# Patient Record
Sex: MALE | Race: WHITE | NOT HISPANIC OR LATINO | Employment: FULL TIME | URBAN - METROPOLITAN AREA
[De-identification: names, ages, dates, MRNs, and addresses within clinical notes are randomized per-mention and may not be internally consistent; named-entity substitution may affect disease eponyms.]

---

## 2018-09-13 ENCOUNTER — TELEPHONE (OUTPATIENT)
Dept: DERMATOLOGY | Facility: CLINIC | Age: 33
End: 2018-09-13

## 2018-09-13 NOTE — TELEPHONE ENCOUNTER
Called PT and rescheduled to sooner appointment.     ----- Message from Dianne Singh sent at 9/13/2018 10:40 AM CDT -----  Contact: Pt  Name of Who is Calling: LOGAN WINSTON [[id number]]      What is the request in detail: Patient is requesting a callback from staff in regards to his scheduled appointment. Please contact to further discuss and advise      Can the clinic reply by [facility]: No       [facility]: [phone]

## 2018-09-25 ENCOUNTER — OFFICE VISIT (OUTPATIENT)
Dept: DERMATOLOGY | Facility: CLINIC | Age: 33
End: 2018-09-25
Payer: COMMERCIAL

## 2018-09-25 DIAGNOSIS — D22.39 FIBROUS PAPULE OF NOSE: Primary | ICD-10-CM

## 2018-09-25 DIAGNOSIS — L73.8 SEBACEOUS HYPERPLASIA: ICD-10-CM

## 2018-09-25 DIAGNOSIS — D23.62 DERMATOFIBROMA OF LEFT UPPER ARM: ICD-10-CM

## 2018-09-25 PROCEDURE — 99213 OFFICE O/P EST LOW 20 MIN: CPT | Mod: S$GLB,,, | Performed by: DERMATOLOGY

## 2018-09-25 NOTE — PROGRESS NOTES
Subjective:       Patient ID:  Jim Waddell is a 33 y.o. male who presents for   Chief Complaint   Patient presents with    Mass     left forearms, 3 months, new    Lesion     nose, 6 months, new      This is a previous patient of mine who is here today with a couple of new complaints. Pt complains of a bump to his left forearm that has been present for about 3 months. It started as a mosquito bite. No symptoms. No change in size, shape, or color. Patient denies any itching, bleeding, pain, or rapid growth.    He also complains of a lesion on his nose that has been present for about 6 months with no symptoms. No change in size, shape, or color. Patient denies any itching, bleeding, pain, or rapid growth.        Mass  - Initial  Affected locations: left arm  Duration: 3 months  Signs / symptoms: asymptomatic  Severity: mild  Timing: constant  Aggravated by: nothing  Relieving factors/Treatments tried: nothing  Improvement on treatment: no relief    Lesion  - Initial  Affected locations: nose  Duration: 6 months  Signs / symptoms: asymptomatic  Severity: mild  Timing: constant  Aggravated by: nothing  Relieving factors/Treatments tried: nothing  Improvement on treatment: no relief      Review of Systems   Skin: Negative for itching and rash.        Objective:    Physical Exam   Constitutional: He appears well-developed and well-nourished. No distress.   HENT:   Mouth/Throat: Lips normal.    Eyes: Lids are normal.  No conjunctival no injection.   Neurological: He is alert and oriented to person, place, and time. He is not disoriented.   Psychiatric: He has a normal mood and affect.   Skin:   Areas Examined (abnormalities noted in diagram):   Head / Face Inspection Performed  Neck Inspection Performed  RUE Inspected  LUE Inspection Performed                   Diagram Legend     Erythematous scaling macule/papule c/w actinic keratosis       Vascular papule c/w angioma      Pigmented verrucoid papule/plaque  c/w seborrheic keratosis      Yellow umbilicated papule c/w sebaceous hyperplasia      Irregularly shaped tan macule c/w lentigo     1-2 mm smooth white papules consistent with Milia      Movable subcutaneous cyst with punctum c/w epidermal inclusion cyst      Subcutaneous movable cyst c/w pilar cyst      Firm pink to brown papule c/w dermatofibroma      Pedunculated fleshy papule(s) c/w skin tag(s)      Evenly pigmented macule c/w junctional nevus     Mildly variegated pigmented, slightly irregular-bordered macule c/w mildly atypical nevus      Flesh colored to evenly pigmented papule c/w intradermal nevus       Pink pearly papule/plaque c/w basal cell carcinoma      Erythematous hyperkeratotic cursted plaque c/w SCC      Surgical scar with no sign of skin cancer recurrence      Open and closed comedones      Inflammatory papules and pustules      Verrucoid papule consistent consistent with wart     Erythematous eczematous patches and plaques     Dystrophic onycholytic nail with subungual debris c/w onychomycosis     Umbilicated papule    Erythematous-base heme-crusted tan verrucoid plaque consistent with inflamed seborrheic keratosis     Erythematous Silvery Scaling Plaque c/w Psoriasis     See annotation      Assessment / Plan:        Fibrous papule of nose  Reassurance was given to the patient. No treatment is necessary.  Discussed biopsy if lesion changes, grows, or bleeds.    Dermatofibroma of left upper arm  Reassurance given to patient. No treatment is necessary.  This is a benign growth that sometimes occurs as a result of trauma or insect bites.    Sebaceous hyperplasia  This is a common condition representing benign enlargement of oil glands. It typically occurs in adulthood. Reassurance was given to the patient. No treatment required.    Follow-up if symptoms worsen or fail to improve.

## 2019-11-11 ENCOUNTER — OFFICE VISIT (OUTPATIENT)
Dept: URGENT CARE | Facility: CLINIC | Age: 34
End: 2019-11-11
Payer: COMMERCIAL

## 2019-11-11 VITALS
HEIGHT: 69 IN | HEART RATE: 89 BPM | BODY MASS INDEX: 33.33 KG/M2 | DIASTOLIC BLOOD PRESSURE: 83 MMHG | RESPIRATION RATE: 16 BRPM | WEIGHT: 225 LBS | SYSTOLIC BLOOD PRESSURE: 121 MMHG | TEMPERATURE: 98 F | OXYGEN SATURATION: 96 %

## 2019-11-11 DIAGNOSIS — J06.9 VIRAL URI WITH COUGH: ICD-10-CM

## 2019-11-11 DIAGNOSIS — L03.031 CELLULITIS OF TOE OF RIGHT FOOT: Primary | ICD-10-CM

## 2019-11-11 DIAGNOSIS — L60.0 INGROWN TOENAIL: ICD-10-CM

## 2019-11-11 PROCEDURE — 99203 PR OFFICE/OUTPT VISIT, NEW, LEVL III, 30-44 MIN: ICD-10-PCS | Mod: S$GLB,,, | Performed by: PHYSICIAN ASSISTANT

## 2019-11-11 PROCEDURE — 3008F BODY MASS INDEX DOCD: CPT | Mod: CPTII,S$GLB,, | Performed by: PHYSICIAN ASSISTANT

## 2019-11-11 PROCEDURE — 99203 OFFICE O/P NEW LOW 30 MIN: CPT | Mod: S$GLB,,, | Performed by: PHYSICIAN ASSISTANT

## 2019-11-11 PROCEDURE — 3008F PR BODY MASS INDEX (BMI) DOCUMENTED: ICD-10-PCS | Mod: CPTII,S$GLB,, | Performed by: PHYSICIAN ASSISTANT

## 2019-11-11 RX ORDER — SULFAMETHOXAZOLE AND TRIMETHOPRIM 800; 160 MG/1; MG/1
1 TABLET ORAL 2 TIMES DAILY
Qty: 14 TABLET | Refills: 0 | Status: SHIPPED | OUTPATIENT
Start: 2019-11-11 | End: 2019-11-18

## 2019-11-11 RX ORDER — PREDNISONE 10 MG/1
TABLET ORAL
Qty: 20 TABLET | Refills: 0 | Status: SHIPPED | OUTPATIENT
Start: 2019-11-11 | End: 2021-02-04 | Stop reason: ALTCHOICE

## 2019-11-11 RX ORDER — AZELASTINE 1 MG/ML
1 SPRAY, METERED NASAL 2 TIMES DAILY
Qty: 30 ML | Refills: 0 | Status: SHIPPED | OUTPATIENT
Start: 2019-11-11 | End: 2020-05-12

## 2019-11-11 RX ORDER — BENZONATATE 100 MG/1
100 CAPSULE ORAL EVERY 6 HOURS PRN
Qty: 60 CAPSULE | Refills: 1 | Status: SHIPPED | OUTPATIENT
Start: 2019-11-11 | End: 2020-11-10

## 2019-11-11 NOTE — PROGRESS NOTES
"Subjective:       Patient ID: Jim Waddell is a 34 y.o. male.    Vitals:  height is 5' 9" (1.753 m) and weight is 102.1 kg (225 lb). His oral temperature is 97.5 °F (36.4 °C). His blood pressure is 121/83 and his pulse is 89. His respiration is 16 and oxygen saturation is 96%.     Chief Complaint: Sinus Problem and Ingrown Toenail    Patient has been having a cough, runny nose, congestion and a headache for about 2 days. Patient has not taken any medicine for the cold. He also has what he thinks may be an ingrown toenail for about 2 days as well.     Sinus Problem   This is a new problem. The current episode started in the past 7 days. The problem is unchanged. Associated symptoms include chills, congestion, coughing, headaches, sinus pressure and a sore throat (itchy). Pertinent negatives include no diaphoresis, ear pain or shortness of breath. Past treatments include nothing. The treatment provided no relief.   Ingrown Toenail   Associated symptoms include chills, congestion, coughing, headaches and a sore throat (itchy). Pertinent negatives include no diaphoresis, fatigue, fever, myalgias, nausea, rash or vomiting.       Constitution: Positive for activity change and chills. Negative for sweating, fatigue and fever.   HENT: Positive for congestion, sinus pressure and sore throat (itchy). Negative for ear pain, sinus pain and voice change.    Neck: Negative for painful lymph nodes.   Eyes: Negative for eye redness.   Respiratory: Positive for cough and sputum production. Negative for chest tightness, bloody sputum, COPD, shortness of breath, stridor, wheezing and asthma.    Gastrointestinal: Negative for nausea and vomiting.   Musculoskeletal: Negative for muscle ache.   Skin: Negative for rash.   Allergic/Immunologic: Negative for seasonal allergies and asthma.   Neurological: Positive for headaches.   Hematologic/Lymphatic: Negative for swollen lymph nodes.       Objective:      Physical Exam "   Constitutional: He is oriented to person, place, and time. He appears well-developed and well-nourished. He is cooperative.  Non-toxic appearance. He does not have a sickly appearance. He does not appear ill. No distress.   HENT:   Head: Normocephalic and atraumatic.   Right Ear: Hearing, tympanic membrane, external ear and ear canal normal.   Left Ear: Hearing, tympanic membrane, external ear and ear canal normal.   Nose: Rhinorrhea present. No mucosal edema or nasal deformity. No epistaxis. Right sinus exhibits no maxillary sinus tenderness and no frontal sinus tenderness. Left sinus exhibits no maxillary sinus tenderness and no frontal sinus tenderness.   Mouth/Throat: Uvula is midline, oropharynx is clear and moist and mucous membranes are normal. No trismus in the jaw. Normal dentition. No uvula swelling. Cobblestoning present. No oropharyngeal exudate, posterior oropharyngeal edema or posterior oropharyngeal erythema.   Eyes: Conjunctivae and lids are normal. No scleral icterus.   Neck: Trachea normal, full passive range of motion without pain and phonation normal. Neck supple. No neck rigidity. No edema and no erythema present.   Cardiovascular: Normal rate, regular rhythm, normal heart sounds, intact distal pulses and normal pulses.   Pulmonary/Chest: Effort normal and breath sounds normal. No respiratory distress. He has no decreased breath sounds. He has no rhonchi.   Abdominal: Normal appearance.   Musculoskeletal: Normal range of motion. He exhibits no edema or deformity.        Feet:    Erythema and swelling, no fluctuance   Neurological: He is alert and oriented to person, place, and time. He exhibits normal muscle tone. Coordination normal.   Skin: Skin is warm, dry, intact, not diaphoretic and not pale.   Psychiatric: He has a normal mood and affect. His speech is normal and behavior is normal. Judgment and thought content normal. Cognition and memory are normal.   Nursing note and vitals  reviewed.        Assessment:       1. Cellulitis of toe of right foot    2. Ingrown toenail    3. Viral URI with cough        Plan:         Cellulitis of toe of right foot    Ingrown toenail    Viral URI with cough    Other orders  -     sulfamethoxazole-trimethoprim 800-160mg (BACTRIM DS) 800-160 mg Tab; Take 1 tablet by mouth 2 (two) times daily. for 7 days  Dispense: 14 tablet; Refill: 0  -     benzonatate (TESSALON PERLES) 100 MG capsule; Take 1 capsule (100 mg total) by mouth every 6 (six) hours as needed.  Dispense: 60 capsule; Refill: 1  -     predniSONE (DELTASONE) 10 MG tablet; Take 40mg for 2days, take 30mg for 2 days, take 20mg for 2 days, take 10mg for 2 days  Dispense: 20 tablet; Refill: 0  -     azelastine (ASTELIN) 137 mcg (0.1 %) nasal spray; 1 spray (137 mcg total) by Nasal route 2 (two) times daily.  Dispense: 30 mL; Refill: 0     Patient was cellulitis surrounding ingrown toenail on right foot.  Discussed with patient will need to treat infection for toenail can be removed.  Discussed warm soaks with water and Betadine.  May return to clinic when healed or see podiatrist for further treatment of ingrown toenail.    Discussed likely viral URI will need to run its course.  Discussed symptomatic treatment warning signs as listed below.    Symptomatic treatment:    Alternate Tylenol and Ibuprofen every 3 hrs  salt water gargles to soothe throat  Honey/lemon water to soothe throat  Cold-eeze helps to reduce the duration of URI symptoms  Elderberry to reduce duration of URI symptoms  Cepachol helps to numb the discomfort in throat  Nasal saline spray reduces inflammation and dryness  Warm face compresses/hot showers as often as you can to open sinuses   Vicks vapor rub at night  Flonase OTC or Nasacort OTC  Simple foods like chicken noodle soup help hydrate  Delsym helps with coughing at night  Zyrtec/Claritin during the day and Benadryl at night may help if allergy component   Zantac will help if there  is reflux from the post nasal drip  Rest as much as you can  Your symptoms will likely last 7-10 days, maybe longer depending on how it affects your body.  You are contagious 7-10, so minimize contact with others to reduce the spread to others and stay home from work or school as we discussed. Dehydration is preventable but is one of the main reasons why you will feel so badly. Drink pedialyte, gatorade or propel. Stay hydrated.  Antibiotics are not needed unless a complication(such as Otitis Media, Bacterial sinus infection or pneumonia). Taking antibiotics for Flu/Cold is not supported by evidencebased medicine and can expose you to unnecessary side effects of the medication, such as anaphylaxis.   If you experience any:  Chest pain, shortness of breath, wheezing or difficulty breathing  Severe headache, face, neck or ear pain  New rash  Fever over 101.5º F (38.6 C) for more than three days  Confusion, behavior change or seizure  Severe weakness or dizziness  Go to ER

## 2020-01-28 ENCOUNTER — NURSE TRIAGE (OUTPATIENT)
Dept: ADMINISTRATIVE | Facility: CLINIC | Age: 35
End: 2020-01-28

## 2020-01-28 NOTE — TELEPHONE ENCOUNTER
Got a call from another nurse-- does not need services.     Reason for Disposition   Caller has already spoken with another triager or PCP (or office), and has further questions and triager able to answer questions.    Additional Information   Negative: Caller has already spoken with another triager and has no further questions   Negative: Caller has already spoken with the PCP (or office), and has no further questions    Protocols used: NO CONTACT OR DUPLICATE CONTACT CALL-A-[place]

## 2020-01-28 NOTE — TELEPHONE ENCOUNTER
Jim, age 34 years, says has no pcp, called to say he has been having chest pains for the last two weeks.  Says they last at least 10 minutes when they occur, and usually at least 20 minutes, and longer.  He describes them as centered in the mid-left chest, they present as a pressure and heaviness, with a pinching and squeezing pain during the pressure and heaviness, and rate from 3-6/10 pain.  Once only he also had the pain radiate to his mid-left back, a very sharp pain. The last time he had his BP checked was during [facility] visit, 11/11/2019, and it was 121/83 at that time. He said he lives in [place], but is in [facility] the [facility] today.  Per [facility] triage protocol, recommend ED now for evaluation. Says he will turn around and go to [facility] ED on [address] now.  Recommended he not drive [place] in a car alone, with chest pain, but go to [facility] ED as is closest.  Assured him note will be provided in chart, and [facility] providers will have access to my note.      Reason for Disposition   Chest pain lasting longer than 5 minutes   Intermittent chest pain and pain has been increasing in severity or frequency    Additional Information   Negative: Severe difficulty breathing (e.g., struggling for each breath, speaks in single words)   Negative: Passed out (i.e., fainted, collapsed and was not responding)   Negative: Chest pain lasting longer than 5 minutes and ANY of the following:* Over 50 years old* Over 30 years old and at least one cardiac risk factor (i.e., high blood pressure, diabetes, high cholesterol, obesity, smoker or strong family history of heart disease)* Pain is crushing, pressure-like, or heavy * Took nitroglycerin and chest pain was not relieved* History of heart disease (i.e., angina, heart attack, bypass surgery, angioplasty, CHF)   Negative: Visible sweat on face or sweat dripping down face   Negative: Sounds like a life-threatening emergency to the triager    Negative: Followed an injury to chest   Negative: SEVERE chest pain   Negative: Pain also present in shoulder(s) or arm(s) or jaw   Negative: Difficulty breathing   Negative: Cocaine use within last 3 days   Negative: History of prior 'blood clot' in leg or lungs (i.e., deep vein thrombosis, pulmonary embolism)   Negative: Recent illness requiring prolonged bed rest (i.e., immobilization)   Negative: Hip or leg fracture in past 2 months (e.g, or had cast on leg or ankle)   Negative: Major surgery in the past month   Negative: Recent long-distance travel with prolonged time in car, bus, plane, or train (i.e., within past 2 weeks; 6 or more hours duration)   Negative: Heart beating irregularly or very rapidly    Protocols used: CHEST PAIN-A-[place]

## 2020-02-03 DIAGNOSIS — R07.89 OTHER CHEST PAIN: Primary | ICD-10-CM

## 2020-02-03 NOTE — PROGRESS NOTES
I was contacted by Mr Waddell regarding intermittent chest pain. He has been evaluated in the ED recently on 1/28. EKG and labs unremarkable.   Will obtain exercise stress echo to evaluate ongoing symptoms. He will f/u with me after the stress test.

## 2020-02-05 ENCOUNTER — HOSPITAL ENCOUNTER (OUTPATIENT)
Dept: CARDIOLOGY | Facility: CLINIC | Age: 35
Discharge: HOME | End: 2020-02-05
Attending: INTERNAL MEDICINE
Payer: COMMERCIAL

## 2020-02-05 VITALS — HEIGHT: 70 IN | WEIGHT: 230 LBS | BODY MASS INDEX: 32.93 KG/M2

## 2020-02-05 DIAGNOSIS — R07.89 OTHER CHEST PAIN: ICD-10-CM

## 2020-02-05 LAB
ASCENDING AORTA: 2.78 CM
BSA FOR ECHO PROCEDURE: 2.27 M2
CV ECHO LV RWT: 0.3 CM
CV STRESS BASE HR: 65 BPM
DIASTOLIC BLOOD PRESSURE: 70 MMHG
DOP CALC LVOT AREA: 4.2 CM2
DOP CALC LVOT DIAMETER: 2.3 CM
DOP CALC LVOT PEAK VEL: 1.1 M/S
DOP CALC LVOT STROKE VOLUME: 87 CM3
DOP CALCLVOT PEAK VEL VTI: 20.95 CM
E WAVE DECELERATION TIME: 194.53 MSEC
E/A RATIO: 1.28
E/E' RATIO: 5.48 M/S
ECHO LV POSTERIOR WALL: 0.78 CM (ref 0.6–1.1)
FRACTIONAL SHORTENING: 33 % (ref 28–44)
INTERVENTRICULAR SEPTUM: 0.85 CM (ref 0.6–1.1)
IVRT: 0.06 MSEC
LA MAJOR: 5.59 CM
LA MINOR: 5.26 CM
LA WIDTH: 4.03 CM
LEFT ATRIUM SIZE: 3.47 CM
LEFT ATRIUM VOLUME INDEX: 29.1 ML/M2
LEFT ATRIUM VOLUME: 64.42 CM3
LEFT INTERNAL DIMENSION IN SYSTOLE: 3.52 CM (ref 2.1–4)
LEFT VENTRICLE DIASTOLIC VOLUME INDEX: 59.01 ML/M2
LEFT VENTRICLE DIASTOLIC VOLUME: 130.7 ML
LEFT VENTRICLE MASS INDEX: 68 G/M2
LEFT VENTRICLE SYSTOLIC VOLUME INDEX: 23.3 ML/M2
LEFT VENTRICLE SYSTOLIC VOLUME: 51.62 ML
LEFT VENTRICULAR INTERNAL DIMENSION IN DIASTOLE: 5.22 CM (ref 3.5–6)
LEFT VENTRICULAR MASS: 149.68 G
LV LATERAL E/E' RATIO: 4.11 M/S
LV SEPTAL E/E' RATIO: 8.22 M/S
MV PEAK A VEL: 0.58 M/S
MV PEAK E VEL: 0.74 M/S
OHS CV CPX 1 MINUTE RECOVERY HEART RATE: 166 BPM
OHS CV CPX 85 PERCENT MAX PREDICTED HEART RATE MALE: 159
OHS CV CPX ESTIMATED METS: 18
OHS CV CPX MAX PREDICTED HEART RATE: 187
OHS CV CPX PATIENT IS FEMALE: 0
OHS CV CPX PATIENT IS MALE: 1
OHS CV CPX PEAK DIASTOLIC BLOOD PRESSURE: 95 MMHG
OHS CV CPX PEAK HEAR RATE: 200 BPM
OHS CV CPX PEAK RATE PRESSURE PRODUCT: NORMAL
OHS CV CPX PEAK SYSTOLIC BLOOD PRESSURE: 232 MMHG
OHS CV CPX PERCENT MAX PREDICTED HEART RATE ACHIEVED: 107
OHS CV CPX RATE PRESSURE PRODUCT PRESENTING: 6565
PISA TR MAX VEL: 2.08 M/S
PULM VEIN S/D RATIO: 0.83
PV PEAK D VEL: 0.59 M/S
PV PEAK S VEL: 0.49 M/S
RA MAJOR: 5.01 CM
RA PRESSURE: 8 MMHG
RA WIDTH: 4.2 CM
RIGHT VENTRICULAR END-DIASTOLIC DIMENSION: 4.16 CM
RV TISSUE DOPPLER FREE WALL SYSTOLIC VELOCITY 1 (APICAL 4 CHAMBER VIEW): 12.12 CM/S
SINUS: 3.67 CM
STJ: 2.91 CM
STRESS ECHO POST EXERCISE DUR MIN: 10 MINUTES
STRESS ECHO POST EXERCISE DUR SEC: 20 SECONDS
STRESS ST DEPRESSION: 1 MM
SYSTOLIC BLOOD PRESSURE: 101 MMHG
TDI LATERAL: 0.18 M/S
TDI SEPTAL: 0.09 M/S
TDI: 0.14 M/S
TR MAX PG: 17 MMHG
TV REST PULMONARY ARTERY PRESSURE: 25 MMHG

## 2020-02-05 PROCEDURE — 93351 STRESS ECHO (CUPID ONLY): ICD-10-PCS | Mod: 26,,, | Performed by: INTERNAL MEDICINE

## 2020-02-05 PROCEDURE — 93351 STRESS TTE COMPLETE: CPT

## 2020-02-05 PROCEDURE — 93351 STRESS TTE COMPLETE: CPT | Mod: 26,,, | Performed by: INTERNAL MEDICINE

## 2020-05-12 ENCOUNTER — PATIENT MESSAGE (OUTPATIENT)
Dept: DERMATOLOGY | Facility: CLINIC | Age: 35
End: 2020-05-12

## 2020-05-12 ENCOUNTER — TELEPHONE (OUTPATIENT)
Dept: DERMATOLOGY | Facility: CLINIC | Age: 35
End: 2020-05-12

## 2020-05-12 ENCOUNTER — OFFICE VISIT (OUTPATIENT)
Dept: OTOLARYNGOLOGY | Facility: CLINIC | Age: 35
End: 2020-05-12
Payer: COMMERCIAL

## 2020-05-12 VITALS
SYSTOLIC BLOOD PRESSURE: 100 MMHG | DIASTOLIC BLOOD PRESSURE: 80 MMHG | WEIGHT: 232.69 LBS | BODY MASS INDEX: 33.31 KG/M2 | HEIGHT: 70 IN

## 2020-05-12 DIAGNOSIS — H69.93 EUSTACHIAN TUBE DYSFUNCTION, BILATERAL: Primary | ICD-10-CM

## 2020-05-12 DIAGNOSIS — J30.2 SEASONAL ALLERGIC RHINITIS, UNSPECIFIED TRIGGER: ICD-10-CM

## 2020-05-12 DIAGNOSIS — H60.331 SWIMMER'S EAR OF RIGHT SIDE, UNSPECIFIED CHRONICITY: ICD-10-CM

## 2020-05-12 PROCEDURE — 3008F PR BODY MASS INDEX (BMI) DOCUMENTED: ICD-10-PCS | Mod: CPTII,S$GLB,, | Performed by: OTOLARYNGOLOGY

## 2020-05-12 PROCEDURE — 92504 PR EAR MICROSCOPY EXAMINATION: ICD-10-PCS | Mod: S$GLB,,, | Performed by: OTOLARYNGOLOGY

## 2020-05-12 PROCEDURE — 3008F BODY MASS INDEX DOCD: CPT | Mod: CPTII,S$GLB,, | Performed by: OTOLARYNGOLOGY

## 2020-05-12 PROCEDURE — 99204 OFFICE O/P NEW MOD 45 MIN: CPT | Mod: S$GLB,,, | Performed by: OTOLARYNGOLOGY

## 2020-05-12 PROCEDURE — 92504 EAR MICROSCOPY EXAMINATION: CPT | Mod: S$GLB,,, | Performed by: OTOLARYNGOLOGY

## 2020-05-12 PROCEDURE — 99204 PR OFFICE/OUTPT VISIT, NEW, LEVL IV, 45-59 MIN: ICD-10-PCS | Mod: S$GLB,,, | Performed by: OTOLARYNGOLOGY

## 2020-05-12 RX ORDER — CIPROFLOXACIN AND DEXAMETHASONE 3; 1 MG/ML; MG/ML
4 SUSPENSION/ DROPS AURICULAR (OTIC) 2 TIMES DAILY
Qty: 7.5 ML | Refills: 0 | Status: SHIPPED | OUTPATIENT
Start: 2020-05-12 | End: 2020-05-22

## 2020-05-12 RX ORDER — AZELASTINE 1 MG/ML
1 SPRAY, METERED NASAL 2 TIMES DAILY
Qty: 30 ML | Refills: 3 | Status: SHIPPED | OUTPATIENT
Start: 2020-05-12 | End: 2020-08-21 | Stop reason: SDUPTHER

## 2020-05-12 RX ORDER — FLUTICASONE PROPIONATE 50 MCG
1 SPRAY, SUSPENSION (ML) NASAL 2 TIMES DAILY
Qty: 15.8 ML | Refills: 3 | Status: SHIPPED | OUTPATIENT
Start: 2020-05-12 | End: 2020-08-21 | Stop reason: SDUPTHER

## 2020-05-12 NOTE — TELEPHONE ENCOUNTER
----- Message from Soto Hart sent at 5/12/2020  1:01 PM CDT -----  Contact: pt  Pt is calling to speak with nurse in regards to scheduling a vv for dandruff. Please give pt a call back at [phone] .

## 2020-05-12 NOTE — PATIENT INSTRUCTIONS
"Information and instructions from your visit with me today:    · Start using the following medication nasal sprays:   · Fluticasone spray:    This medication is a steroid spray. It stays within the nose and does not have absorption into the body that leads to side effects that one has with oral steroid medication. Fluticasone nasal spray is the same as the Flonase brand nasal spray. Discuss with your pharmacist if the price is lower over the counter or with a prescription ( this varies depending on insurance). The medication that is over the counter is the same as the prescription medication. Use this medication as instructed on the prescription, 2 sprays on each side of your nose once daily.     · Azelastine  spray:  This medication is an antihistamine used to treat nasal symptoms of allergy, which works specifically in the nose unlike antihistamine pills which have more of an effect on the whole body. Use this medication as instructed on the prescription, 1 spray on each side of your nose twice daily.     Additional instructions for medication sprays  Place the tip of the medication bottle in your nose and aim slightly up and out on each side to get medication high and deep into your nose and sinuses, and not have it all deposit in the very front of your nose. Aim the tip of the nozzle towards the outer corner of your eye . You can imagine aiming towards the back of your eyeball on each side for this, as opposed to straight back to the center of your nose and head.     You need to use this medication every day regardless of symptoms, as it takes time ( a few weeks) to work and get the benefits. It does not work on an "as needed" basis like taking a decongestant. If your symptoms only occur in a particular season, then the medication can be used seasonally instead of year long. For seasonal symptoms, you should start using the spray twice daily a month before when you normally have symptoms ( for example, if " symptoms start in August, should start at the end of June).     · Start nasal irrigations with saline solution:    SALINE SINUS RINSE ([employer] brand): You should do a full bottle, half on one side of your nose and half on the other, 1-2 times per day (or more if able to, you cannot do this too much). Follow the instructions on the box: mix the salt packet with clean water (bottle, previously boiled, distilled, etc -- not tap water) to the line on the bottle to make the irrigation.     NASAL SALINE SPRAY ( 0.9%) There are several different brands found in the cold and flu aisle of the pharmacy. You can also use simply saline or other brand of saline spray that delivers saline by gentle mist if you have difficulty or discomfort with neilmed rinse. Always rinse your nose with saline prior to using medication sprays and wait a couple of hours before using again.      · Do not use nasal decongestant sprays such as Afrin or similar products.  This can cause long term physical nasal addiction. Afrin should only be used if having nose bleeds and should not be used for more than 2-3 days in a row . It is a not a medication that should be used for a long period of time.     It was nice meeting you today, and I look forward to helping you feel better soon. Please don't hesitate to call if you have any other questions or concerns, or if I can be of any assistance in the meantime.      Amara Martinez MD    [facility]    Phone  [phone]    Fax      [phone]        Amara Martinez MD  Otorhinolaryngology

## 2020-05-12 NOTE — PROGRESS NOTES
Otolaryngology Clinic Note  Date:  05/12/2020     Chief complaint:  Chief Complaint   Patient presents with    Otalgia     Right Ear Drainage       History of Present Illness  Jim Waddell is a 35 y.o. male  presenting today for a new evaluation and treatment of bloody ear drainage.  Couple weeks ago woke up with blood on pillow, about size of teaspoon. Does not remember any issues with ears prior.   No ear pain at the time or now. No recent swimming. Does not recall getting water in ear. Ear feels moist though.   Uses qtips but does not remember any pain or blood on qtip.   + itching, has history of scalp dandruff. Has been on nizoril for a year but dandruff is worse again.  No hearing loss but has noted hyper sensitive hearing since this happen  Aural fullness, +popping sensation at times in both ears.    No head trauma history. No history of PET or ear surgery  No heartbeat sensation in ear, no tinnitus    Has a history of seasonal allergies, usually in spring. Sometimes in fall. Takes Claritin prn. No nasal sprays    He had been getting some chest pain, was seen in ER and workup done by cardiology. He is not sure if he has reflux. Has seemed to improve with doing stretching exercises, he thinks may have been related to back issue. He does eat spicy food/drinks red wine and caffeine and chocolate.    Past Medical History   Allergic rhinitis    Past Surgical History   Inguinal hernia repair   Soft tisue debridement arm after trauma  Appendectomy  compound fracture left forearm   PRK surgery     Medications  Current Outpatient Medications on File Prior to Visit   Medication Sig Dispense Refill    azelastine (ASTELIN) 137 mcg (0.1 %) nasal spray 1 spray (137 mcg total) by Nasal route 2 (two) times daily. (Patient not taking: Reported on 5/12/2020) 30 mL 0    benzonatate (TESSALON PERLES) 100 MG capsule Take 1 capsule (100 mg total) by mouth every 6 (six) hours as needed. (Patient not taking:  "Reported on 5/12/2020) 60 capsule 1    predniSONE (DELTASONE) 10 MG tablet Take 40mg for 2days, take 30mg for 2 days, take 20mg for 2 days, take 10mg for 2 days (Patient not taking: Reported on 5/12/2020) 20 tablet 0     No current facility-administered medications on file prior to visit.        Review of Systems  Review of Systems   Constitutional: Negative for fever.   HENT: Positive for congestion and ear pain. Negative for ear discharge and tinnitus.    Eyes: Negative for blurred vision and double vision.   Respiratory: Negative for cough.    Cardiovascular: Negative for chest pain.   Gastrointestinal: Positive for heartburn.   Musculoskeletal: Positive for back pain (chronic).   Skin: Negative for itching.   Neurological: Negative for dizziness and headaches.   Endo/Heme/Allergies: Positive for environmental allergies.    See HPI for further details    Social History   reports that he has never smoked. He has never used smokeless tobacco. He reports that he drinks alcohol. He reports that he does not use drugs.     Family History  Family History   Problem Relation Age of Onset    No Known Problems Mother     No Known Problems Father     Melanoma Neg Hx         Physical Exam   Vitals:    05/12/20 1029   BP: 100/80    Body mass index is 33.39 kg/m².  Weight: 105.6 kg (232 lb 11.1 oz)   Height: 5' 10" (177.8 cm)     GENERAL: alert, no acute distress.  HEAD: normocephalic.   SKIN: no lesions of skin of head and neck area.  EYES: lids and lashes normal. Pupils equal. No proptosis  EARS: external ear without lesion, normal pinna shape and position.  External auditory canal with minimal to no cerumen  but no desquamation/ scaling or other evidence of hyperkeratosis besides lack of cerumen bilaterally- right ear canal with dried blood on floor of canal near bony cartilaginous junction- see microscope exam note, bilateral tympanic membrane fully visible, no perforation , mild retraction bilaterally. No middle ear " effusion. Ossicles intact.   NOSE: external nose without abnormality, septum grossly midline on anterior rhinoscopy. Mild turbinate bogginess and erythema.  ORAL CAVITY/OROPHARYNX: dentition good , angle class 1 occlusion, no anterior dental wear. no mass or lesion of gingiva/buccal mucosa/floor of mouth/tongue. tongue midline and mobile. No tmj crepitus.  Symmetric palate rise. Uvula midline.   NECK: trachea midline. No discrete palpable thyroid nodule.  No scars.  LYMPH NODES:No cervical lymphadenopathy.  RESPIRATORY: no stridor, no stertor. Voice normal. Respirations nonlabored.  NEURO: alert, responds to questions appropriately.   PSYCH:mood appropriate    Procedure Note   Procedure performed: microscopic examination of ear  with debridement    Indication for procedure: foreign material in ear/bleeding from ear    Description of procedure:  After verbal consent was obtained, the patient was positioned in semi recumbent position and speculum was placed in the right  ear and microscope brought into the field.  The microscope was positioned and magnification adjusted for appropriate visualization. Otologic instruments including various size otologic suctions and alligator forcep were used to remove the dried blood carefully from the external auditory canal under visualization with the operating microscope. Peroxide was instilled to assist with breaking up the dried blood. This was suctioned out.   After cleaning, visualization was again performed and at various levels of higher magnification to optimize views of the ear canal, tympanic membrane, ossicles and middle ear space. Findings as indicated below. All portions of the procedure and examination by otomicroscopy were tolerated well without complication.     Findings:  Right eac The majority of crusting on floor of eac was removed easily, underlying skin normal, anterior/inferior canal wall with crusting that was difficult to remove and had some pinpoint  bleeding with removal, skin under was mildly edematous and erythematous. No hemotympanum. No glomus, no middle ear effusion, no perforation. Ossicles intact      Imaging:  The patient does not have any pertinent and/or recent imaging of the head and neck.     Labs:  CBC  Recent Labs   Lab 01/28/20  1032   WBC 6.84   Hemoglobin 15.7   Hematocrit 46.1   Mean Corpuscular Volume 91   Platelets 259     BMP  Recent Labs   Lab 01/28/20  1032   Glucose 101   Sodium 143   Potassium 4.2   Chloride 105   CO2 27   BUN, Bld 13   Creatinine 1.14   Calcium 9.6       Assessment  1. Eustachian tube dysfunction, bilateral    2. Seasonal allergic rhinitis, unspecified trigger    3. Swimmer's ear of right side, unspecified chronicity       Plan:  Discussed plan of care with patient in detail and all questions answered. Patient reported understanding of plan of care.   Otitis externa right ear: ciprodex otic for 10 days. Underlying ear tissue with mild erythema. Suspect pt has some baseline irritation of canals with history of seborrheic dermatitis even though I did not see overt findings of this today ( except for lack of cerumen). Partially debrided dried blood, part of area started to bleed slightly therefore stopped and will use ciprodex to help dissolve remainder of blood and soften up crust in case further debridement needed later.     Dried blood in ear: do not use qtips, see above. No hemotympanum/no head trauma to indicate middle ear pathology    eustachian tube dysfunction/allergic rhinitis: Will start dual nasal spray therapy as combo of steroid and antihistamine nasal spray has been shown in evidence based studies to be better than either alone. Discussed medication administration technique ( point toward outer corner of eye and not towards nasal septum) and use nasal saline prior to medication sprays. Start one month prior to allergy season; will do a trial currently to see if this helps with popping sensation as he is  currently in his allergy season time however discussed with him importance of starting medication before allergy season to prevent symptoms ( in the future). Discussed long term risk of flonase associated with cataracts.    Counseled patient to see if he notices chest pain sx after eating spicy food/red wine/chocolate etc. As this could be GERD. He currently is not having any issues with this however.      Follow up 10- 14 days for repeat micro exam. Pt will call to make appointment- has  baby at home.

## 2020-05-12 NOTE — TELEPHONE ENCOUNTER
----- Message from Kristina Dalton sent at 5/12/2020  1:53 PM CDT -----  Contact: henrik Robertson - pt - pt is returning the nurses phone call about a virtual appt can you please call pt at [phone].    ELISA

## 2020-05-12 NOTE — TELEPHONE ENCOUNTER
Called PT to set up VV. Epic text was sent to his phone to set up Iman. Asked PT to call back once he has Iman set up to book appointment

## 2020-05-15 ENCOUNTER — OFFICE VISIT (OUTPATIENT)
Dept: DERMATOLOGY | Facility: CLINIC | Age: 35
End: 2020-05-15
Payer: COMMERCIAL

## 2020-05-15 DIAGNOSIS — L21.9 SEBORRHEIC DERMATITIS: Primary | ICD-10-CM

## 2020-05-15 DIAGNOSIS — R23.4 FISSURE IN SKIN: ICD-10-CM

## 2020-05-15 DIAGNOSIS — L85.9 HYPERKERATOSIS: ICD-10-CM

## 2020-05-15 PROCEDURE — 99214 PR OFFICE/OUTPT VISIT, EST, LEVL IV, 30-39 MIN: ICD-10-PCS | Mod: 95,,, | Performed by: DERMATOLOGY

## 2020-05-15 PROCEDURE — 99214 OFFICE O/P EST MOD 30 MIN: CPT | Mod: 95,,, | Performed by: DERMATOLOGY

## 2020-05-15 RX ORDER — CICLOPIROX 1 G/100ML
SHAMPOO TOPICAL
Qty: 240 ML | Refills: 5 | Status: SHIPPED | OUTPATIENT
Start: 2020-05-15 | End: 2020-11-13 | Stop reason: SDUPTHER

## 2020-05-15 RX ORDER — BETAMETHASONE VALERATE 0.1 %
LOTION (ML) TOPICAL
Qty: 60 ML | Refills: 3 | Status: SHIPPED | OUTPATIENT
Start: 2020-05-15 | End: 2021-03-30 | Stop reason: SDUPTHER

## 2020-05-15 RX ORDER — UREA 40 %
CREAM (GRAM) TOPICAL
Qty: 198.6 G | Refills: 3 | Status: SHIPPED | OUTPATIENT
Start: 2020-05-15 | End: 2021-03-30

## 2020-05-15 NOTE — PROGRESS NOTES
Subjective:       Patient ID:  Jim Waddell is a 35 y.o. male who presents for No chief complaint on file.    The patient location is: home  The chief complaint leading to consultation is: ***  Visit type: virtual visit with synchronous audio and video  Total time spent with patient: *** minutes  Each patient to whom I provide medical services by telemedicine is:  (1) informed of the relationship between the physician and patient and the respective role of any other health care provider with respect to management of the patient; and (2) notified that he or she may decline to receive medical services by telemedicine and may withdraw from such care at any time.      Dry Skin  - Initial  Affected locations: scalp, left ear, right ear and left foot  Duration: past month.  Signs / symptoms: cracking, dryness and itching  Severity: mild to moderate  Timing: intermittent  Aggravated by: scratching  Relieving factors/Treatments tried: OTC moisturizer  Improvement on treatment: no relief        Review of Systems   Skin: Positive for dry skin.        Objective:    Physical Exam       Diagram Legend     Erythematous scaling macule/papule c/w actinic keratosis       Vascular papule c/w angioma      Pigmented verrucoid papule/plaque c/w seborrheic keratosis      Yellow umbilicated papule c/w sebaceous hyperplasia      Irregularly shaped tan macule c/w lentigo     1-2 mm smooth white papules consistent with Milia      Movable subcutaneous cyst with punctum c/w epidermal inclusion cyst      Subcutaneous movable cyst c/w pilar cyst      Firm pink to brown papule c/w dermatofibroma      Pedunculated fleshy papule(s) c/w skin tag(s)      Evenly pigmented macule c/w junctional nevus     Mildly variegated pigmented, slightly irregular-bordered macule c/w mildly atypical nevus      Flesh colored to evenly pigmented papule c/w intradermal nevus       Pink pearly papule/plaque c/w basal cell carcinoma      Erythematous  hyperkeratotic cursted plaque c/w SCC      Surgical scar with no sign of skin cancer recurrence      Open and closed comedones      Inflammatory papules and pustules      Verrucoid papule consistent consistent with wart     Erythematous eczematous patches and plaques     Dystrophic onycholytic nail with subungual debris c/w onychomycosis     Umbilicated papule    Erythematous-base heme-crusted tan verrucoid plaque consistent with inflamed seborrheic keratosis     Erythematous Silvery Scaling Plaque c/w Psoriasis     See annotation      Assessment / Plan:        There are no diagnoses linked to this encounter.         No follow-ups on file.

## 2020-05-15 NOTE — PROGRESS NOTES
Subjective:       Patient ID:  Jim Waddell is a 35 y.o. male who presents for   Chief Complaint   Patient presents with    Dry Skin     The patient location is: home  The chief complaint leading to consultation is: scalp problem and dry skin  Visit type: virtual visit with synchronous audio and video  Total time spent with patient: 21 minutes  Each patient to whom I provide medical services by telemedicine is:  (1) informed of the relationship between the physician and patient and the respective role of any other health care provider with respect to management of the patient; and (2) notified that he or she may decline to receive medical services by telemedicine and may withdraw from such care at any time.      Hair/Scalp Problem  - Initial  Affected locations: scalp, left ear and right ear  Duration: 2 months  Signs / symptoms: itching and dryness  Severity: mild to moderate  Timing: intermittent  Aggravated by: scratching  Treatments tried: OTC Nizoral shampoo.  Improvement on treatment: no relief (helped at first but not anymore)    Dry Skin  - Initial  Affected locations: left foot  Duration: 1 month  Signs / symptoms: cracking and dryness  Severity: mild to moderate  Timing: constant  Relieving factors/Treatments tried: OTC moisturizer  Improvement on treatment: no relief      Review of Systems   Skin: Positive for itching, rash and dry skin.   Psychiatric/Behavioral: Positive for high stress.        Objective:    Physical Exam   Constitutional: He appears well-developed and well-nourished. No distress.   HENT:   Mouth/Throat: Lips normal.    Eyes: Lids are normal.  No conjunctival no injection.   Neurological: He is alert and oriented to person, place, and time. He is not disoriented.   Psychiatric: He has a normal mood and affect.   Skin:   Areas Examined (abnormalities noted in diagram):   Scalp / Hair Palpated and Inspected  Head / Face Inspection Performed  Neck Inspection Performed  LLE  Inspection Performed                 Diagram Legend     Erythematous scaling macule/papule c/w actinic keratosis       Vascular papule c/w angioma      Pigmented verrucoid papule/plaque c/w seborrheic keratosis      Yellow umbilicated papule c/w sebaceous hyperplasia      Irregularly shaped tan macule c/w lentigo     1-2 mm smooth white papules consistent with Milia      Movable subcutaneous cyst with punctum c/w epidermal inclusion cyst      Subcutaneous movable cyst c/w pilar cyst      Firm pink to brown papule c/w dermatofibroma      Pedunculated fleshy papule(s) c/w skin tag(s)      Evenly pigmented macule c/w junctional nevus     Mildly variegated pigmented, slightly irregular-bordered macule c/w mildly atypical nevus      Flesh colored to evenly pigmented papule c/w intradermal nevus       Pink pearly papule/plaque c/w basal cell carcinoma      Erythematous hyperkeratotic cursted plaque c/w SCC      Surgical scar with no sign of skin cancer recurrence      Open and closed comedones      Inflammatory papules and pustules      Verrucoid papule consistent consistent with wart     Erythematous eczematous patches and plaques     Dystrophic onycholytic nail with subungual debris c/w onychomycosis     Umbilicated papule    Erythematous-base heme-crusted tan verrucoid plaque consistent with inflamed seborrheic keratosis     Erythematous Silvery Scaling Plaque c/w Psoriasis     See annotation      Assessment / Plan:        Seborrheic dermatitis  -     ciclopirox 1 % shampoo; Lather scalp for 5-10 min, then rinse. Use 1-2x/week.  Dispense: 240 mL; Refill: 5  -     betamethasone valerate 0.1% (VALISONE) 0.1 % Lotn; AAA of scalp daily-bid prn scaling/itching.  Dispense: 60 mL; Refill: 3    Hyperkeratosis  -     urea (CARMOL) 40 % Crea; AAA to feet qday after soak  Dispense: 198.6 g; Refill: 3    Fissure in skin  Recommended daily cleaning with a gentle cleanser, followed by Vaseline until completely healed. Apply Vaseline  prior to urea cream.      Follow up in about 3 months (around 8/15/2020).

## 2020-08-21 ENCOUNTER — CLINICAL SUPPORT (OUTPATIENT)
Dept: AUDIOLOGY | Facility: CLINIC | Age: 35
End: 2020-08-21
Payer: COMMERCIAL

## 2020-08-21 ENCOUNTER — OFFICE VISIT (OUTPATIENT)
Dept: OTOLARYNGOLOGY | Facility: CLINIC | Age: 35
End: 2020-08-21
Payer: COMMERCIAL

## 2020-08-21 VITALS
SYSTOLIC BLOOD PRESSURE: 110 MMHG | HEIGHT: 70 IN | BODY MASS INDEX: 32.3 KG/M2 | TEMPERATURE: 98 F | DIASTOLIC BLOOD PRESSURE: 70 MMHG | WEIGHT: 225.63 LBS

## 2020-08-21 DIAGNOSIS — H93.293 ABNORMAL AUDITORY PERCEPTION, BILATERAL: Primary | ICD-10-CM

## 2020-08-21 DIAGNOSIS — H69.93 EUSTACHIAN TUBE DYSFUNCTION, BILATERAL: ICD-10-CM

## 2020-08-21 DIAGNOSIS — J30.2 SEASONAL ALLERGIC RHINITIS, UNSPECIFIED TRIGGER: Primary | ICD-10-CM

## 2020-08-21 DIAGNOSIS — H93.11 TINNITUS OF RIGHT EAR: ICD-10-CM

## 2020-08-21 PROCEDURE — 3008F BODY MASS INDEX DOCD: CPT | Mod: CPTII,S$GLB,, | Performed by: OTOLARYNGOLOGY

## 2020-08-21 PROCEDURE — 3008F PR BODY MASS INDEX (BMI) DOCUMENTED: ICD-10-PCS | Mod: CPTII,S$GLB,, | Performed by: OTOLARYNGOLOGY

## 2020-08-21 PROCEDURE — 92550 TYMPANOMETRY & REFLEX THRESH: CPT | Mod: S$GLB,,, | Performed by: AUDIOLOGIST

## 2020-08-21 PROCEDURE — 92557 COMPREHENSIVE HEARING TEST: CPT | Mod: S$GLB,,, | Performed by: AUDIOLOGIST

## 2020-08-21 PROCEDURE — 92557 PR COMPREHENSIVE HEARING TEST: ICD-10-PCS | Mod: S$GLB,,, | Performed by: AUDIOLOGIST

## 2020-08-21 PROCEDURE — 99213 OFFICE O/P EST LOW 20 MIN: CPT | Mod: S$GLB,,, | Performed by: OTOLARYNGOLOGY

## 2020-08-21 PROCEDURE — 92550 PR TYMPANOMETRY AND REFLEX THRESHOLD MEASUREMENTS: ICD-10-PCS | Mod: S$GLB,,, | Performed by: AUDIOLOGIST

## 2020-08-21 PROCEDURE — 99213 PR OFFICE/OUTPT VISIT, EST, LEVL III, 20-29 MIN: ICD-10-PCS | Mod: S$GLB,,, | Performed by: OTOLARYNGOLOGY

## 2020-08-21 RX ORDER — AZELASTINE 1 MG/ML
1 SPRAY, METERED NASAL 2 TIMES DAILY
Qty: 30 ML | Refills: 3 | Status: SHIPPED | OUTPATIENT
Start: 2020-08-21 | End: 2021-03-30

## 2020-08-21 RX ORDER — FLUTICASONE PROPIONATE 50 MCG
1 SPRAY, SUSPENSION (ML) NASAL 2 TIMES DAILY
Qty: 15.8 ML | Refills: 3 | Status: SHIPPED | OUTPATIENT
Start: 2020-08-21 | End: 2020-11-13 | Stop reason: SDUPTHER

## 2020-08-21 NOTE — PATIENT INSTRUCTIONS
"Information and instructions from your visit with me today:    · You can take pseudoephedrine (sudafed) that you have to sign for over the counter (not phenylephrine)  · Start using the following medication nasal sprays:   · Fluticasone spray:    This medication is a steroid spray. It stays within the nose and does not have absorption into the body that leads to side effects that one has with oral steroid medication. Fluticasone nasal spray is the same as the Flonase brand nasal spray. Discuss with your pharmacist if the price is lower over the counter or with a prescription ( this varies depending on insurance). The medication that is over the counter is the same as the prescription medication. Use this medication as instructed on the prescription, 1-2 sprays on each side of your nose twice daily.     · Azelastine  spray:  This medication is an antihistamine used to treat nasal symptoms of allergy, which works specifically in the nose unlike antihistamine pills which have more of an effect on the whole body. Use this medication as instructed on the prescription, 1 spray on each side of your nose twice daily.     Additional instructions for medication sprays  Place the tip of the medication bottle in your nose and aim slightly up and out on each side to get medication high and deep into your nose and sinuses, and not have it all deposit in the very front of your nose. Aim the tip of the nozzle towards the outer corner of your eye . You can imagine aiming towards the back of your eyeball on each side for this, as opposed to straight back to the center of your nose and head.     You need to use this medication every day regardless of symptoms, as it takes time ( a few weeks) to work and get the benefits. It does not work on an "as needed" basis like taking a decongestant. If your symptoms only occur in a particular season, then the medication can be used seasonally instead of year long. For seasonal symptoms, you " should start using the spray twice daily a month before when you normally have symptoms ( for example, if symptoms start in August, should start at the end of June).     · Start nasal irrigations with saline solution- you can either use a rinse or a mist spray:    SALINE SINUS RINSE ([employer] brand): You should do a full bottle, half on one side of your nose and half on the other, 1-2 times per day (or more if able to, you cannot do this too much). Follow the instructions on the box: mix the salt packet with clean water (bottle, previously boiled, distilled, etc -- not tap water) to the line on the bottle to make the irrigation.         NASAL SALINE SPRAY ( simply saline and arm and hammer are examples) There are several different brands found in the cold and flu aisle of the pharmacy. You can use any brand of saline spray - this will deliver the saline by a gentle mist ( if you have difficulty or discomfort with nasal rinse/ a lot of fluid in the nose, this will be more comfortable).     Always rinse your nose with saline prior to using medication sprays and wait a couple of hours before using again. You can use the saline throughout the day to help with stuffy nose or dry nose.    · Do not use nasal decongestant sprays such as Afrin or similar products long term ( over 3 days) .  This can cause long term physical nasal addiction. Afrin should only be used if having nose bleeds, severe nasal congestion , or severe ear pain/fullness and should not be used for more than 2-3 days in a row . It is a not a medication that should be used for a long period of time.     It was nice meeting you today, and I look forward to helping you feel better soon. Please don't hesitate to call if you have any other questions or concerns, or if I can be of any assistance in the meantime.      Amara Martinez MD    [facility]     Phone  [phone]    Fax      [phone]        Amara Martinez MD  Otorhinolaryngology

## 2020-08-21 NOTE — PROGRESS NOTES
Click on link to view Audiogram  Document on 8/21/2020 10:06 AM by Reyna Buckley MA CCC-A: Audiogram     Jim Waddell was seen today for an audiologic evaluation for right ear pressure and tinnitus.    Tympanometry revealed a Type A tympanogram for both ears.  Audiogram results revealed hearing within normal limits bilaterally.  Speech audiometry revealed a speech reception threshold at 5dBHL with a word recognition score of 100% for the left ear and a speech reception threshold at 10dBHL with a word recognition score of 100% for the right ear.    Recommendations:  1. Otologic evaluation  2. Hearing test as needed

## 2020-08-26 NOTE — PROGRESS NOTES
Otolaryngology Clinic Note  Date:  08/21/2020     Chief complaint:  Chief Complaint   Patient presents with    Otalgia     Right Ear/ Neck pain/ Feeling of Fluid in Ear       History of Present Illness  Jim Waddell is a 35 y.o. male  presenting today for a follow up of ear issues.I initially saw him for an episode of ear bleeding on 5-12-20.; there was no lesion noted in eac(however dried blood was present), no hemotympanum, no TM perf. He had history of ear itching and scalp dandruff and suspected some digital trauma to eac as cause. He has not had any other bleeding episodes since I last saw him .   Today he presents with fluid sensation in ear. He also had some ETD symptoms at initial visit on 5-12 which included aural fullness and a popping sensation at times in both ears. His throat started hurting slightly this AM. 2 days ago he started noticing the increased pain and fluid sensation in the ear on the right and feels it is throbbing. I had prescribed him nasal sprays for ETD at last visit however he has not been using them       Has a history of seasonal allergies, usually in spring. Sometimes in fall. Takes Claritin prn. No nasal sprays. No head trauma history. No history of PET or ear surgery    In January of this year he had been getting some chest pain, was seen in ER and workup done by cardiology. He is not sure if he has reflux. Has seemed to improve with doing stretching exercises, he thinks may have been related to back issue. This has resolved. He does eat spicy food/drinks red wine and caffeine and chocolate. No dsyphagia, no voice change.    Past Medical History   Allergic rhinitis    Past Surgical History   Inguinal hernia repair   Soft tisue debridement arm after trauma  Appendectomy  compound fracture left forearm   PRK surgery     Medications  Current Outpatient Medications on File Prior to Visit   Medication Sig Dispense Refill    betamethasone valerate 0.1% (VALISONE) 0.1 %  "Lotn AAA of scalp daily-bid prn scaling/itching. 60 mL 3    ciclopirox 1 % shampoo Lather scalp for 5-10 min, then rinse. Use 1-2x/week. 240 mL 5    urea (CARMOL) 40 % Crea AAA to feet qday after soak 198.6 g 3    benzonatate (TESSALON PERLES) 100 MG capsule Take 1 capsule (100 mg total) by mouth every 6 (six) hours as needed. (Patient not taking: Reported on 5/12/2020) 60 capsule 1    predniSONE (DELTASONE) 10 MG tablet Take 40mg for 2days, take 30mg for 2 days, take 20mg for 2 days, take 10mg for 2 days (Patient not taking: Reported on 5/12/2020) 20 tablet 0     No current facility-administered medications on file prior to visit.        Review of Systems  Review of Systems   Constitutional: Negative for fever.   HENT: Positive for congestion and ear pain. Negative for ear discharge and tinnitus.    Eyes: Negative for blurred vision and double vision.   Respiratory: Negative for cough.    Cardiovascular: Negative for chest pain.   Gastrointestinal: Positive for heartburn.   Musculoskeletal: Positive for back pain (chronic).   Skin: Negative for itching.   Neurological: Negative for dizziness and headaches.   Endo/Heme/Allergies: Positive for environmental allergies.    See HPI for further details    Social History   reports that he has never smoked. He has never used smokeless tobacco. He reports current alcohol use. He reports that he does not use drugs.     Family History  Family History   Problem Relation Age of Onset    No Known Problems Mother     No Known Problems Father     Melanoma Neg Hx         Physical Exam   Vitals:    08/21/20 0931   BP: 110/70   Temp: 98.3 °F (36.8 °C)    Body mass index is 32.38 kg/m².  Weight: 102.3 kg (225 lb 10.3 oz)   Height: 5' 10" (177.8 cm)     GENERAL:  no acute distress.  HEAD: normocephalic.   SKIN: no lesions of skin of head and neck area.  EYES: lids and lashes normal. Pupils equal. No proptosis  EARS: external ear without lesion, normal pinna shape and position.  " External auditory canal with minimal to no cerumen  but no desquamation/ scaling or other evidence of hyperkeratosis besides lack of cerumen bilaterally; bilateral tympanic membrane fully visible, no perforation , mild retraction bilaterally. No middle ear effusion. Ossicles intact. Ears examined under microscope and with otoscope.   NOSE: external nose without abnormality, septum grossly midline on anterior rhinoscopy. Mild turbinate bogginess and erythema.no purulent drainage  ORAL CAVITY/OROPHARYNX: dentition good , angle class 1 occlusion, no anterior dental wear. no mass or lesion of gingiva/buccal mucosa/floor of mouth/tongue. tongue midline and mobile. No tmj crepitus.  Symmetric palate rise. Uvula midline.   NECK: trachea midline.   LYMPH NODES:No cervical lymphadenopathy.  RESPIRATORY: no stridor, no stertor. Voice normal. Respirations nonlabored.  NEURO: alert, responds to questions appropriately. Facial mvmt symmetric  PSYCH:mood appropriate    AUDIOLOGY RESULTS  Audiometric evaluation including audiogram, tympanometry, acoustic reflexes, and speech discrimination which was performed 8-21-20 was personally reviewed and interpreted.  Notable findings on the audiogram were normal hearing .  Tympanometry revealed Type A tympanogram on the left and Type A tympanogram on the right. Speech discrimination was 100 %  on the left, and 100% on the right.     Report of the audiologist performing this audiometric testing was also reviewed     Imaging:  The patient does not have any pertinent and/or recent imaging of the head and neck.     Labs:  No recent labs    Assessment  1. Seasonal allergic rhinitis, unspecified trigger  - COVID-19 Routine Screening; Future    2. Eustachian tube dysfunction, bilateral       Plan:  Discussed plan of care with patient in detail and all questions answered. Patient reported understanding of plan of care.     allergic rhinitis:  start dual nasal spray therapy as combo of steroid and  antihistamine nasal spray has been shown in evidence based studies to be better than either alone. Discussed medication administration technique ( point toward outer corner of eye and not towards nasal septum) and use nasal saline prior to medication sprays.  Spent a long time discussing importance of this.     Eustachian tube dysfunction (ETD) : ETD can be idiopathic or caused by allergic rhinitis (AR ) or laryngopharyngo reflux (LPR). Sometimes this can lead to development of a middle ear effusion (ALVARO) which may or may not get secondarily infected. Usually the fluid will resolve without intervention however in some cases it can take 8-12 weeks for fluid to resolve completely. Occasionally a tympanostomy tube (PET) needs to be placed for this ETD treatment in certain conditions (persistent ALVARO >2-3 months or if severe pain associated with or without ALVARO, significant retraction of tympanic membrane that appears to be starting to cause conductive hearing changes). In addition to treatment trials for either AR or LPR, the patient can gently autoinsufflate daily to intermittently equalize middle ear pressure . If unsuccessful, pt should not  continue with more frequent or more forceful attempts with this maneuver. Intermittent use of  afrin can also help however I advise to only use if having  severe pain given risk of rhinitis medicamentosa (pt counseled extensively about  risk of physical addiction and not to use for prolonged time period). If symptoms do not resolve, will need  flexible laryngoscopy to rule out referred pain. discussed with pt that even lthough tymps are type a , can still have etd.     F/u 2 months with covid testing for a scope exam . If symptoms improved and he does not want a covid test he can skip this but he understands that for me to scope him he needs a negative covid test.

## 2020-10-14 ENCOUNTER — TELEPHONE (OUTPATIENT)
Dept: OTOLARYNGOLOGY | Facility: CLINIC | Age: 35
End: 2020-10-14

## 2020-10-14 NOTE — TELEPHONE ENCOUNTER
----- Message from Joanie Meyer sent at 10/14/2020 12:40 PM CDT -----  Regarding: self  Type: Patient Call Back    Who called: self  What is the request in detail: please contact the patient about possible side effects since taking fluticasone propionate (FLONASE) 50 mcg/actuation nasal spray and azelastine (ASTELIN) 137 mcg (0.1 %) nasal spray    Can the clinic reply by [facility]? no    Would the patient rather a call back or a response via [facility]?  Call   Best call back number:[phone]

## 2020-10-14 NOTE — TELEPHONE ENCOUNTER
I would be happy to see him sooner than November 7. He will need a scope and a covid test. I do not think it would be from the nasal sprays. Let me know please  if I do not have any appointment slots in the next couple of weeks and I will figure something out/overbook

## 2020-10-14 NOTE — TELEPHONE ENCOUNTER
Patient called back stating that he started having pain in his throat towards the back and the same side as ear pain. Being going on for a couple of weeks now. His next appointment is November 7. His question is do you want him to come in sooner and can this possible be a side affect of all the medications he started: fluticasone propionate (FLONASE) 50 mcg/actuation nasal spray and azelastine (ASTELIN) 137 mcg (0.1 %) nasal spray.

## 2020-11-13 DIAGNOSIS — L21.9 SEBORRHEIC DERMATITIS: ICD-10-CM

## 2020-11-16 RX ORDER — CICLOPIROX 1 G/100ML
SHAMPOO TOPICAL
Qty: 240 ML | Refills: 5 | Status: SHIPPED | OUTPATIENT
Start: 2020-11-16 | End: 2021-03-30 | Stop reason: SDUPTHER

## 2021-02-03 ENCOUNTER — TELEPHONE (OUTPATIENT)
Dept: OTOLARYNGOLOGY | Facility: CLINIC | Age: 36
End: 2021-02-03

## 2021-02-03 ENCOUNTER — OFFICE VISIT (OUTPATIENT)
Dept: OTOLARYNGOLOGY | Facility: CLINIC | Age: 36
End: 2021-02-03
Payer: COMMERCIAL

## 2021-02-03 VITALS — WEIGHT: 228.38 LBS | HEIGHT: 70 IN | BODY MASS INDEX: 32.69 KG/M2

## 2021-02-03 DIAGNOSIS — J02.9 SORE THROAT: ICD-10-CM

## 2021-02-03 DIAGNOSIS — H92.01 OTALGIA, RIGHT: ICD-10-CM

## 2021-02-03 DIAGNOSIS — K21.9 LARYNGOPHARYNGEAL REFLUX (LPR): Primary | ICD-10-CM

## 2021-02-03 PROCEDURE — 99214 PR OFFICE/OUTPT VISIT, EST, LEVL IV, 30-39 MIN: ICD-10-PCS | Mod: 25,S$GLB,, | Performed by: OTOLARYNGOLOGY

## 2021-02-03 PROCEDURE — 31575 DIAGNOSTIC LARYNGOSCOPY: CPT | Mod: S$GLB,,, | Performed by: OTOLARYNGOLOGY

## 2021-02-03 PROCEDURE — 99214 OFFICE O/P EST MOD 30 MIN: CPT | Mod: 25,S$GLB,, | Performed by: OTOLARYNGOLOGY

## 2021-02-03 PROCEDURE — 3008F PR BODY MASS INDEX (BMI) DOCUMENTED: ICD-10-PCS | Mod: CPTII,S$GLB,, | Performed by: OTOLARYNGOLOGY

## 2021-02-03 PROCEDURE — 99999 PR PBB SHADOW E&M-EST. PATIENT-LVL III: ICD-10-PCS | Mod: PBBFAC,,, | Performed by: OTOLARYNGOLOGY

## 2021-02-03 PROCEDURE — 1126F PR PAIN SEVERITY QUANTIFIED, NO PAIN PRESENT: ICD-10-PCS | Mod: S$GLB,,, | Performed by: OTOLARYNGOLOGY

## 2021-02-03 PROCEDURE — 31575 PR LARYNGOSCOPY, FLEXIBLE; DIAGNOSTIC: ICD-10-PCS | Mod: S$GLB,,, | Performed by: OTOLARYNGOLOGY

## 2021-02-03 PROCEDURE — 3008F BODY MASS INDEX DOCD: CPT | Mod: CPTII,S$GLB,, | Performed by: OTOLARYNGOLOGY

## 2021-02-03 PROCEDURE — 99999 PR PBB SHADOW E&M-EST. PATIENT-LVL III: CPT | Mod: PBBFAC,,, | Performed by: OTOLARYNGOLOGY

## 2021-02-03 PROCEDURE — 1126F AMNT PAIN NOTED NONE PRSNT: CPT | Mod: S$GLB,,, | Performed by: OTOLARYNGOLOGY

## 2021-02-03 RX ORDER — PANTOPRAZOLE SODIUM 40 MG/1
40 TABLET, DELAYED RELEASE ORAL DAILY
Qty: 30 TABLET | Refills: 11 | Status: SHIPPED | OUTPATIENT
Start: 2021-02-03 | End: 2022-12-07

## 2021-02-05 ENCOUNTER — TELEPHONE (OUTPATIENT)
Dept: OTOLARYNGOLOGY | Facility: CLINIC | Age: 36
End: 2021-02-05

## 2021-02-08 ENCOUNTER — TELEPHONE (OUTPATIENT)
Dept: OTOLARYNGOLOGY | Facility: CLINIC | Age: 36
End: 2021-02-08

## 2021-02-08 RX ORDER — PANTOPRAZOLE SODIUM 40 MG/1
40 TABLET, DELAYED RELEASE ORAL DAILY
Qty: 30 TABLET | Refills: 11 | Status: CANCELLED | OUTPATIENT
Start: 2021-02-08 | End: 2022-02-08

## 2021-03-01 ENCOUNTER — PATIENT MESSAGE (OUTPATIENT)
Dept: GASTROENTEROLOGY | Facility: CLINIC | Age: 36
End: 2021-03-01

## 2021-03-01 ENCOUNTER — OFFICE VISIT (OUTPATIENT)
Dept: GASTROENTEROLOGY | Facility: CLINIC | Age: 36
End: 2021-03-01
Payer: COMMERCIAL

## 2021-03-01 ENCOUNTER — LAB VISIT (OUTPATIENT)
Dept: LAB | Facility: HOSPITAL | Age: 36
End: 2021-03-01
Attending: INTERNAL MEDICINE
Payer: COMMERCIAL

## 2021-03-01 VITALS — BODY MASS INDEX: 32.28 KG/M2 | WEIGHT: 225.5 LBS | HEIGHT: 70 IN

## 2021-03-01 DIAGNOSIS — R19.7 DIARRHEA, UNSPECIFIED TYPE: Primary | ICD-10-CM

## 2021-03-01 DIAGNOSIS — R10.9 ABDOMINAL PAIN, UNSPECIFIED ABDOMINAL LOCATION: ICD-10-CM

## 2021-03-01 DIAGNOSIS — R19.7 DIARRHEA, UNSPECIFIED TYPE: ICD-10-CM

## 2021-03-01 LAB
C DIFF GDH STL QL: NEGATIVE
C DIFF TOX A+B STL QL IA: NEGATIVE

## 2021-03-01 PROCEDURE — 87324 CLOSTRIDIUM AG IA: CPT

## 2021-03-01 PROCEDURE — 99204 PR OFFICE/OUTPT VISIT, NEW, LEVL IV, 45-59 MIN: ICD-10-PCS | Mod: S$GLB,,, | Performed by: INTERNAL MEDICINE

## 2021-03-01 PROCEDURE — 3008F BODY MASS INDEX DOCD: CPT | Mod: CPTII,S$GLB,, | Performed by: INTERNAL MEDICINE

## 2021-03-01 PROCEDURE — 87045 FECES CULTURE AEROBIC BACT: CPT

## 2021-03-01 PROCEDURE — 99999 PR PBB SHADOW E&M-EST. PATIENT-LVL II: CPT | Mod: PBBFAC,,, | Performed by: INTERNAL MEDICINE

## 2021-03-01 PROCEDURE — 89055 LEUKOCYTE ASSESSMENT FECAL: CPT

## 2021-03-01 PROCEDURE — 3008F PR BODY MASS INDEX (BMI) DOCUMENTED: ICD-10-PCS | Mod: CPTII,S$GLB,, | Performed by: INTERNAL MEDICINE

## 2021-03-01 PROCEDURE — 99999 PR PBB SHADOW E&M-EST. PATIENT-LVL II: ICD-10-PCS | Mod: PBBFAC,,, | Performed by: INTERNAL MEDICINE

## 2021-03-01 PROCEDURE — 99204 OFFICE O/P NEW MOD 45 MIN: CPT | Mod: S$GLB,,, | Performed by: INTERNAL MEDICINE

## 2021-03-01 PROCEDURE — 83993 ASSAY FOR CALPROTECTIN FECAL: CPT

## 2021-03-01 PROCEDURE — 87046 STOOL CULTR AEROBIC BACT EA: CPT

## 2021-03-01 PROCEDURE — 87449 NOS EACH ORGANISM AG IA: CPT

## 2021-03-01 PROCEDURE — 87209 SMEAR COMPLEX STAIN: CPT

## 2021-03-01 PROCEDURE — 87425 ROTAVIRUS AG IA: CPT

## 2021-03-01 PROCEDURE — 1126F AMNT PAIN NOTED NONE PRSNT: CPT | Mod: S$GLB,,, | Performed by: INTERNAL MEDICINE

## 2021-03-01 PROCEDURE — 87329 GIARDIA AG IA: CPT

## 2021-03-01 PROCEDURE — 87427 SHIGA-LIKE TOXIN AG IA: CPT

## 2021-03-01 PROCEDURE — 1126F PR PAIN SEVERITY QUANTIFIED, NO PAIN PRESENT: ICD-10-PCS | Mod: S$GLB,,, | Performed by: INTERNAL MEDICINE

## 2021-03-01 RX ORDER — DICYCLOMINE HYDROCHLORIDE 20 MG/1
20 TABLET ORAL EVERY 6 HOURS
Qty: 120 TABLET | Refills: 3 | Status: SHIPPED | OUTPATIENT
Start: 2021-03-01 | End: 2021-03-31

## 2021-03-02 LAB
CRYPTOSP AG STL QL IA: NEGATIVE
E COLI SXT1 STL QL IA: NEGATIVE
E COLI SXT2 STL QL IA: NEGATIVE
G LAMBLIA AG STL QL IA: NEGATIVE
RV AG STL QL IA.RAPID: NEGATIVE
WBC #/AREA STL HPF: ABNORMAL /[HPF]

## 2021-03-03 LAB — O+P STL MICRO: NORMAL

## 2021-03-04 LAB — BACTERIA STL CULT: NORMAL

## 2021-03-05 ENCOUNTER — TELEPHONE (OUTPATIENT)
Dept: GASTROENTEROLOGY | Facility: CLINIC | Age: 36
End: 2021-03-05

## 2021-03-08 ENCOUNTER — TELEPHONE (OUTPATIENT)
Dept: GASTROENTEROLOGY | Facility: CLINIC | Age: 36
End: 2021-03-08

## 2021-03-08 ENCOUNTER — OFFICE VISIT (OUTPATIENT)
Dept: GASTROENTEROLOGY | Facility: CLINIC | Age: 36
End: 2021-03-08
Payer: COMMERCIAL

## 2021-03-08 DIAGNOSIS — R19.7 DIARRHEA, UNSPECIFIED TYPE: Primary | ICD-10-CM

## 2021-03-08 DIAGNOSIS — Z01.812 PRE-PROCEDURE LAB EXAM: ICD-10-CM

## 2021-03-08 DIAGNOSIS — R10.9 ABDOMINAL PAIN, UNSPECIFIED ABDOMINAL LOCATION: ICD-10-CM

## 2021-03-08 LAB — CALPROTECTIN STL-MCNT: ABNORMAL MCG/G

## 2021-03-08 PROCEDURE — 99213 OFFICE O/P EST LOW 20 MIN: CPT | Mod: 95,,, | Performed by: INTERNAL MEDICINE

## 2021-03-08 PROCEDURE — 99213 PR OFFICE/OUTPT VISIT, EST, LEVL III, 20-29 MIN: ICD-10-PCS | Mod: 95,,, | Performed by: INTERNAL MEDICINE

## 2021-03-08 RX ORDER — SODIUM, POTASSIUM,MAG SULFATES 17.5-3.13G
1 SOLUTION, RECONSTITUTED, ORAL ORAL DAILY
Qty: 1 KIT | Refills: 0 | Status: SHIPPED | OUTPATIENT
Start: 2021-03-08 | End: 2021-03-10

## 2021-03-15 ENCOUNTER — PATIENT MESSAGE (OUTPATIENT)
Dept: ENDOSCOPY | Facility: HOSPITAL | Age: 36
End: 2021-03-15

## 2021-03-22 ENCOUNTER — LAB VISIT (OUTPATIENT)
Dept: FAMILY MEDICINE | Facility: CLINIC | Age: 36
End: 2021-03-22
Payer: COMMERCIAL

## 2021-03-22 DIAGNOSIS — Z01.812 PRE-PROCEDURE LAB EXAM: ICD-10-CM

## 2021-03-22 PROCEDURE — U0003 INFECTIOUS AGENT DETECTION BY NUCLEIC ACID (DNA OR RNA); SEVERE ACUTE RESPIRATORY SYNDROME CORONAVIRUS 2 (SARS-COV-2) (CORONAVIRUS DISEASE [COVID-19]), AMPLIFIED PROBE TECHNIQUE, MAKING USE OF HIGH THROUGHPUT TECHNOLOGIES AS DESCRIBED BY CMS-2020-01-R: HCPCS | Performed by: INTERNAL MEDICINE

## 2021-03-22 PROCEDURE — U0005 INFEC AGEN DETEC AMPLI PROBE: HCPCS | Performed by: INTERNAL MEDICINE

## 2021-03-23 ENCOUNTER — TELEPHONE (OUTPATIENT)
Dept: ENDOSCOPY | Facility: HOSPITAL | Age: 36
End: 2021-03-23

## 2021-03-23 LAB — SARS-COV-2 RNA RESP QL NAA+PROBE: NOT DETECTED

## 2021-03-25 ENCOUNTER — ANESTHESIA EVENT (OUTPATIENT)
Dept: ENDOSCOPY | Facility: HOSPITAL | Age: 36
End: 2021-03-25
Payer: COMMERCIAL

## 2021-03-25 ENCOUNTER — HOSPITAL ENCOUNTER (OUTPATIENT)
Facility: HOSPITAL | Age: 36
Discharge: HOME | End: 2021-03-25
Attending: INTERNAL MEDICINE | Admitting: INTERNAL MEDICINE
Payer: COMMERCIAL

## 2021-03-25 ENCOUNTER — ANESTHESIA (OUTPATIENT)
Dept: ENDOSCOPY | Facility: HOSPITAL | Age: 36
End: 2021-03-25
Payer: COMMERCIAL

## 2021-03-25 VITALS
TEMPERATURE: 98 F | HEIGHT: 69 IN | SYSTOLIC BLOOD PRESSURE: 115 MMHG | OXYGEN SATURATION: 98 % | BODY MASS INDEX: 32.58 KG/M2 | DIASTOLIC BLOOD PRESSURE: 79 MMHG | WEIGHT: 220 LBS | HEART RATE: 72 BPM | RESPIRATION RATE: 18 BRPM

## 2021-03-25 DIAGNOSIS — R19.7 DIARRHEA: ICD-10-CM

## 2021-03-25 PROCEDURE — 45380 PR COLONOSCOPY,BIOPSY: ICD-10-PCS | Mod: ,,, | Performed by: INTERNAL MEDICINE

## 2021-03-25 PROCEDURE — 27201012 HC FORCEPS, HOT/COLD, DISP: Performed by: INTERNAL MEDICINE

## 2021-03-25 PROCEDURE — 88305 TISSUE EXAM BY PATHOLOGIST: CPT | Performed by: PATHOLOGY

## 2021-03-25 PROCEDURE — 25000003 PHARM REV CODE 250: Performed by: INTERNAL MEDICINE

## 2021-03-25 PROCEDURE — 37000008 HC ANESTHESIA 1ST 15 MINUTES: Performed by: INTERNAL MEDICINE

## 2021-03-25 PROCEDURE — 45380 COLONOSCOPY AND BIOPSY: CPT | Mod: ,,, | Performed by: INTERNAL MEDICINE

## 2021-03-25 PROCEDURE — 25000003 PHARM REV CODE 250: Performed by: NURSE ANESTHETIST, CERTIFIED REGISTERED

## 2021-03-25 PROCEDURE — 88305 TISSUE EXAM BY PATHOLOGIST: ICD-10-PCS | Mod: 26,,, | Performed by: PATHOLOGY

## 2021-03-25 PROCEDURE — 88305 TISSUE EXAM BY PATHOLOGIST: CPT | Mod: 26,,, | Performed by: PATHOLOGY

## 2021-03-25 PROCEDURE — 45380 COLONOSCOPY AND BIOPSY: CPT | Performed by: INTERNAL MEDICINE

## 2021-03-25 PROCEDURE — 63600175 PHARM REV CODE 636 W HCPCS: Performed by: NURSE ANESTHETIST, CERTIFIED REGISTERED

## 2021-03-25 PROCEDURE — 37000009 HC ANESTHESIA EA ADD 15 MINS: Performed by: INTERNAL MEDICINE

## 2021-03-25 RX ORDER — SODIUM CHLORIDE 9 MG/ML
INJECTION, SOLUTION INTRAVENOUS CONTINUOUS
Status: DISCONTINUED | OUTPATIENT
Start: 2021-03-25 | End: 2021-03-25 | Stop reason: HOSPADM

## 2021-03-25 RX ORDER — PROPOFOL 10 MG/ML
VIAL (ML) INTRAVENOUS CONTINUOUS PRN
Status: DISCONTINUED | OUTPATIENT
Start: 2021-03-25 | End: 2021-03-25

## 2021-03-25 RX ORDER — PROPOFOL 10 MG/ML
VIAL (ML) INTRAVENOUS
Status: DISCONTINUED | OUTPATIENT
Start: 2021-03-25 | End: 2021-03-25

## 2021-03-25 RX ORDER — SODIUM CHLORIDE 0.9 % (FLUSH) 0.9 %
3 SYRINGE (ML) INJECTION
Status: DISCONTINUED | OUTPATIENT
Start: 2021-03-25 | End: 2021-03-25 | Stop reason: HOSPADM

## 2021-03-25 RX ORDER — LIDOCAINE HCL/PF 100 MG/5ML
SYRINGE (ML) INTRAVENOUS
Status: DISCONTINUED | OUTPATIENT
Start: 2021-03-25 | End: 2021-03-25

## 2021-03-25 RX ORDER — SODIUM CHLORIDE 0.9 % (FLUSH) 0.9 %
10 SYRINGE (ML) INJECTION
Status: DISCONTINUED | OUTPATIENT
Start: 2021-03-25 | End: 2021-03-25 | Stop reason: HOSPADM

## 2021-03-25 RX ADMIN — PROPOFOL 50 MG: 10 INJECTION, EMULSION INTRAVENOUS at 01:03

## 2021-03-25 RX ADMIN — SODIUM CHLORIDE 20 ML/HR: 0.9 INJECTION, SOLUTION INTRAVENOUS at 12:03

## 2021-03-25 RX ADMIN — LIDOCAINE HYDROCHLORIDE 60 MG: 20 INJECTION, SOLUTION INTRAVENOUS at 01:03

## 2021-03-25 RX ADMIN — PROPOFOL 150 MCG/KG/MIN: 10 INJECTION, EMULSION INTRAVENOUS at 01:03

## 2021-03-26 ENCOUNTER — TELEPHONE (OUTPATIENT)
Dept: ENDOSCOPY | Facility: HOSPITAL | Age: 36
End: 2021-03-26

## 2021-03-29 ENCOUNTER — TELEPHONE (OUTPATIENT)
Dept: GASTROENTEROLOGY | Facility: CLINIC | Age: 36
End: 2021-03-29

## 2021-03-29 ENCOUNTER — PATIENT MESSAGE (OUTPATIENT)
Dept: DERMATOLOGY | Facility: CLINIC | Age: 36
End: 2021-03-29

## 2021-03-29 LAB
FINAL PATHOLOGIC DIAGNOSIS: NORMAL
GROSS: NORMAL
Lab: NORMAL

## 2021-03-30 ENCOUNTER — OFFICE VISIT (OUTPATIENT)
Dept: DERMATOLOGY | Facility: CLINIC | Age: 36
End: 2021-03-30
Payer: COMMERCIAL

## 2021-03-30 DIAGNOSIS — L21.9 SEBORRHEIC DERMATITIS: ICD-10-CM

## 2021-03-30 DIAGNOSIS — D22.62 MELANOCYTIC NEVUS OF LEFT UPPER EXTREMITY: ICD-10-CM

## 2021-03-30 DIAGNOSIS — L73.8 SEBACEOUS HYPERPLASIA: ICD-10-CM

## 2021-03-30 DIAGNOSIS — L23.7 ALLERGIC CONTACT DERMATITIS DUE TO PLANTS, EXCEPT FOOD: Primary | ICD-10-CM

## 2021-03-30 PROCEDURE — 1126F PR PAIN SEVERITY QUANTIFIED, NO PAIN PRESENT: ICD-10-PCS | Mod: S$GLB,,, | Performed by: DERMATOLOGY

## 2021-03-30 PROCEDURE — 99214 OFFICE O/P EST MOD 30 MIN: CPT | Mod: 25,S$GLB,, | Performed by: DERMATOLOGY

## 2021-03-30 PROCEDURE — 96372 PR INJECTION,THERAP/PROPH/DIAG2ST, IM OR SUBCUT: ICD-10-PCS | Mod: S$GLB,,, | Performed by: DERMATOLOGY

## 2021-03-30 PROCEDURE — 1126F AMNT PAIN NOTED NONE PRSNT: CPT | Mod: S$GLB,,, | Performed by: DERMATOLOGY

## 2021-03-30 PROCEDURE — 99214 PR OFFICE/OUTPT VISIT, EST, LEVL IV, 30-39 MIN: ICD-10-PCS | Mod: 25,S$GLB,, | Performed by: DERMATOLOGY

## 2021-03-30 PROCEDURE — 96372 THER/PROPH/DIAG INJ SC/IM: CPT | Mod: S$GLB,,, | Performed by: DERMATOLOGY

## 2021-03-30 RX ORDER — BETAMETHASONE SODIUM PHOSPHATE AND BETAMETHASONE ACETATE 3; 3 MG/ML; MG/ML
6 INJECTION, SUSPENSION INTRA-ARTICULAR; INTRALESIONAL; INTRAMUSCULAR; SOFT TISSUE
Status: COMPLETED | OUTPATIENT
Start: 2021-03-30 | End: 2021-03-30

## 2021-03-30 RX ORDER — TRIAMCINOLONE ACETONIDE 40 MG/ML
40 INJECTION, SUSPENSION INTRA-ARTICULAR; INTRAMUSCULAR
Status: COMPLETED | OUTPATIENT
Start: 2021-03-30 | End: 2021-03-30

## 2021-03-30 RX ORDER — BETAMETHASONE VALERATE 0.1 %
LOTION (ML) TOPICAL
Qty: 60 ML | Refills: 3 | Status: SHIPPED | OUTPATIENT
Start: 2021-03-30 | End: 2023-12-14 | Stop reason: SDUPTHER

## 2021-03-30 RX ORDER — FLUOCINONIDE 0.5 MG/G
CREAM TOPICAL
Qty: 60 G | Refills: 3 | Status: ON HOLD | OUTPATIENT
Start: 2021-03-30 | End: 2022-12-15 | Stop reason: SDUPTHER

## 2021-03-30 RX ORDER — CICLOPIROX 1 G/100ML
SHAMPOO TOPICAL
Qty: 240 ML | Refills: 5 | Status: SHIPPED | OUTPATIENT
Start: 2021-03-30 | End: 2022-09-22

## 2021-03-30 RX ADMIN — TRIAMCINOLONE ACETONIDE 40 MG: 40 INJECTION, SUSPENSION INTRA-ARTICULAR; INTRAMUSCULAR at 08:03

## 2021-03-30 RX ADMIN — BETAMETHASONE SODIUM PHOSPHATE AND BETAMETHASONE ACETATE 6 MG: 3; 3 INJECTION, SUSPENSION INTRA-ARTICULAR; INTRALESIONAL; INTRAMUSCULAR; SOFT TISSUE at 08:03

## 2021-04-15 ENCOUNTER — PATIENT MESSAGE (OUTPATIENT)
Dept: RESEARCH | Facility: HOSPITAL | Age: 36
End: 2021-04-15

## 2021-05-04 ENCOUNTER — OFFICE VISIT (OUTPATIENT)
Dept: OTOLARYNGOLOGY | Facility: CLINIC | Age: 36
End: 2021-05-04
Payer: COMMERCIAL

## 2021-05-04 VITALS — HEIGHT: 69 IN | WEIGHT: 229.06 LBS | BODY MASS INDEX: 33.93 KG/M2

## 2021-05-04 DIAGNOSIS — R22.1 LOCALIZED SWELLING, MASS AND LUMP, NECK: ICD-10-CM

## 2021-05-04 DIAGNOSIS — K21.9 LARYNGOPHARYNGEAL REFLUX (LPR): Primary | ICD-10-CM

## 2021-05-04 PROCEDURE — 3008F PR BODY MASS INDEX (BMI) DOCUMENTED: ICD-10-PCS | Mod: CPTII,S$GLB,, | Performed by: OTOLARYNGOLOGY

## 2021-05-04 PROCEDURE — 1125F AMNT PAIN NOTED PAIN PRSNT: CPT | Mod: S$GLB,,, | Performed by: OTOLARYNGOLOGY

## 2021-05-04 PROCEDURE — 3008F BODY MASS INDEX DOCD: CPT | Mod: CPTII,S$GLB,, | Performed by: OTOLARYNGOLOGY

## 2021-05-04 PROCEDURE — 99214 PR OFFICE/OUTPT VISIT, EST, LEVL IV, 30-39 MIN: ICD-10-PCS | Mod: S$GLB,,, | Performed by: OTOLARYNGOLOGY

## 2021-05-04 PROCEDURE — 99999 PR PBB SHADOW E&M-EST. PATIENT-LVL III: CPT | Mod: PBBFAC,,, | Performed by: OTOLARYNGOLOGY

## 2021-05-04 PROCEDURE — 99999 PR PBB SHADOW E&M-EST. PATIENT-LVL III: ICD-10-PCS | Mod: PBBFAC,,, | Performed by: OTOLARYNGOLOGY

## 2021-05-04 PROCEDURE — 1125F PR PAIN SEVERITY QUANTIFIED, PAIN PRESENT: ICD-10-PCS | Mod: S$GLB,,, | Performed by: OTOLARYNGOLOGY

## 2021-05-04 PROCEDURE — 99214 OFFICE O/P EST MOD 30 MIN: CPT | Mod: S$GLB,,, | Performed by: OTOLARYNGOLOGY

## 2021-05-09 ENCOUNTER — PATIENT MESSAGE (OUTPATIENT)
Dept: OTOLARYNGOLOGY | Facility: CLINIC | Age: 36
End: 2021-05-09

## 2021-12-19 ENCOUNTER — NURSE TRIAGE (OUTPATIENT)
Dept: ADMINISTRATIVE | Facility: CLINIC | Age: 36
End: 2021-12-19
Payer: COMMERCIAL

## 2021-12-20 ENCOUNTER — INFUSION (OUTPATIENT)
Dept: INFECTIOUS DISEASES | Facility: HOSPITAL | Age: 36
End: 2021-12-20
Attending: INTERNAL MEDICINE
Payer: COMMERCIAL

## 2021-12-20 ENCOUNTER — OFFICE VISIT (OUTPATIENT)
Dept: URGENT CARE | Facility: CLINIC | Age: 36
End: 2021-12-20
Payer: COMMERCIAL

## 2021-12-20 VITALS
WEIGHT: 227 LBS | RESPIRATION RATE: 18 BRPM | DIASTOLIC BLOOD PRESSURE: 71 MMHG | OXYGEN SATURATION: 95 % | TEMPERATURE: 99 F | BODY MASS INDEX: 33.62 KG/M2 | HEART RATE: 94 BPM | SYSTOLIC BLOOD PRESSURE: 117 MMHG | HEIGHT: 69 IN

## 2021-12-20 VITALS
WEIGHT: 227 LBS | HEIGHT: 69 IN | DIASTOLIC BLOOD PRESSURE: 85 MMHG | SYSTOLIC BLOOD PRESSURE: 121 MMHG | BODY MASS INDEX: 33.62 KG/M2 | OXYGEN SATURATION: 98 % | HEART RATE: 85 BPM | TEMPERATURE: 98 F

## 2021-12-20 DIAGNOSIS — Z11.52 ENCOUNTER FOR SCREENING FOR COVID-19: ICD-10-CM

## 2021-12-20 DIAGNOSIS — U07.1 COVID-19 VIRUS DETECTED: Primary | ICD-10-CM

## 2021-12-20 DIAGNOSIS — U07.1 COVID-19: Primary | ICD-10-CM

## 2021-12-20 LAB
CTP QC/QA: YES
SARS-COV-2 RDRP RESP QL NAA+PROBE: POSITIVE

## 2021-12-20 PROCEDURE — 25000003 PHARM REV CODE 250: Performed by: INTERNAL MEDICINE

## 2021-12-20 PROCEDURE — 99213 OFFICE O/P EST LOW 20 MIN: CPT | Mod: S$GLB,,, | Performed by: NURSE PRACTITIONER

## 2021-12-20 PROCEDURE — 63600175 PHARM REV CODE 636 W HCPCS: Performed by: INTERNAL MEDICINE

## 2021-12-20 PROCEDURE — 99213 PR OFFICE/OUTPT VISIT, EST, LEVL III, 20-29 MIN: ICD-10-PCS | Mod: S$GLB,,, | Performed by: NURSE PRACTITIONER

## 2021-12-20 PROCEDURE — M0243 CASIRIVI AND IMDEVI INFUSION: HCPCS | Performed by: INTERNAL MEDICINE

## 2021-12-20 PROCEDURE — U0002: ICD-10-PCS | Mod: QW,S$GLB,, | Performed by: NURSE PRACTITIONER

## 2021-12-20 PROCEDURE — U0002 COVID-19 LAB TEST NON-CDC: HCPCS | Mod: QW,S$GLB,, | Performed by: NURSE PRACTITIONER

## 2021-12-20 RX ORDER — ACETAMINOPHEN 325 MG/1
650 TABLET ORAL ONCE AS NEEDED
Status: ACTIVE | OUTPATIENT
Start: 2021-12-20 | End: 2033-05-18

## 2021-12-20 RX ORDER — EPINEPHRINE 0.3 MG/.3ML
0.3 INJECTION SUBCUTANEOUS
Status: DISCONTINUED | OUTPATIENT
Start: 2021-12-20 | End: 2023-01-13 | Stop reason: HOSPADM

## 2021-12-20 RX ORDER — DIPHENHYDRAMINE HYDROCHLORIDE 50 MG/ML
25 INJECTION INTRAMUSCULAR; INTRAVENOUS ONCE AS NEEDED
Status: DISCONTINUED | OUTPATIENT
Start: 2021-12-20 | End: 2023-01-17

## 2021-12-20 RX ORDER — ALBUTEROL SULFATE 90 UG/1
2 AEROSOL, METERED RESPIRATORY (INHALATION)
Status: ACTIVE | OUTPATIENT
Start: 2021-12-20

## 2021-12-20 RX ORDER — SODIUM CHLORIDE 0.9 % (FLUSH) 0.9 %
10 SYRINGE (ML) INJECTION
Status: DISCONTINUED | OUTPATIENT
Start: 2021-12-20 | End: 2022-12-15 | Stop reason: HOSPADM

## 2021-12-20 RX ORDER — ONDANSETRON 4 MG/1
4 TABLET, ORALLY DISINTEGRATING ORAL ONCE AS NEEDED
Status: DISCONTINUED | OUTPATIENT
Start: 2021-12-20 | End: 2023-01-17

## 2021-12-20 RX ADMIN — CASIRIVIMAB AND IMDEVIMAB 600 MG: 600; 600 INJECTION, SOLUTION, CONCENTRATE INTRAVENOUS at 12:12

## 2022-03-15 ENCOUNTER — TELEPHONE (OUTPATIENT)
Dept: OTOLARYNGOLOGY | Facility: CLINIC | Age: 37
End: 2022-03-15
Payer: COMMERCIAL

## 2022-03-15 DIAGNOSIS — J02.9 SORE THROAT: ICD-10-CM

## 2022-03-15 DIAGNOSIS — R22.1 LOCALIZED SWELLING, MASS AND LUMP, NECK: Primary | ICD-10-CM

## 2022-03-15 DIAGNOSIS — H92.01 OTALGIA, RIGHT: ICD-10-CM

## 2022-03-15 NOTE — TELEPHONE ENCOUNTER
Pt called to schedule his CT w/contrast. Tried to schedule but the order was . Please place new CT order and lab order. Thanks

## 2022-03-15 NOTE — TELEPHONE ENCOUNTER
----- Message from Tawnya Mendenhall sent at 3/15/2022  1:34 PM CDT -----  Contact: Pt  Type: Needs Medical Advice    Who Called:Pt  Best Call Back Number:[phone]    Additional Information Requesting a call back regarding Pt was calling in regards to there CT with contrast pt wanted to see if Dr would be able to place a new order for CT pt states they are not sure if the prev order has  showing a req date of 2021 pt stated to please call when available Thank you   Please Advise-Thank you

## 2022-03-16 ENCOUNTER — LAB VISIT (OUTPATIENT)
Dept: LAB | Facility: HOSPITAL | Age: 37
End: 2022-03-16
Attending: OTOLARYNGOLOGY
Payer: COMMERCIAL

## 2022-03-16 DIAGNOSIS — H92.01 OTALGIA, RIGHT: ICD-10-CM

## 2022-03-16 DIAGNOSIS — R22.1 LOCALIZED SWELLING, MASS AND LUMP, NECK: ICD-10-CM

## 2022-03-16 DIAGNOSIS — J02.9 SORE THROAT: ICD-10-CM

## 2022-03-16 LAB
CREAT SERPL-MCNC: 1.2 MG/DL (ref 0.5–1.4)
EST. GFR  (AFRICAN AMERICAN): >60 ML/MIN/1.73 M^2
EST. GFR  (NON AFRICAN AMERICAN): >60 ML/MIN/1.73 M^2

## 2022-03-16 PROCEDURE — 82565 ASSAY OF CREATININE: CPT | Performed by: OTOLARYNGOLOGY

## 2022-03-16 PROCEDURE — 36415 COLL VENOUS BLD VENIPUNCTURE: CPT | Mod: PO | Performed by: OTOLARYNGOLOGY

## 2022-03-24 ENCOUNTER — PATIENT MESSAGE (OUTPATIENT)
Dept: OTOLARYNGOLOGY | Facility: CLINIC | Age: 37
End: 2022-03-24
Payer: COMMERCIAL

## 2022-03-24 ENCOUNTER — HOSPITAL ENCOUNTER (OUTPATIENT)
Dept: RADIOLOGY | Facility: HOSPITAL | Age: 37
Discharge: HOME | End: 2022-03-24
Attending: OTOLARYNGOLOGY
Payer: COMMERCIAL

## 2022-03-24 DIAGNOSIS — R22.1 LOCALIZED SWELLING, MASS AND LUMP, NECK: ICD-10-CM

## 2022-03-24 DIAGNOSIS — J02.9 SORE THROAT: ICD-10-CM

## 2022-03-24 DIAGNOSIS — H92.01 OTALGIA, RIGHT: ICD-10-CM

## 2022-03-24 PROCEDURE — 70491 CT SOFT TISSUE NECK WITH CONTRAST: ICD-10-PCS | Mod: 26,,, | Performed by: RADIOLOGY

## 2022-03-24 PROCEDURE — 70491 CT SOFT TISSUE NECK W/DYE: CPT | Mod: 26,,, | Performed by: RADIOLOGY

## 2022-03-24 PROCEDURE — 70491 CT SOFT TISSUE NECK W/DYE: CPT | Mod: TC

## 2022-03-24 PROCEDURE — 25500020 PHARM REV CODE 255: Performed by: OTOLARYNGOLOGY

## 2022-03-24 RX ADMIN — IOHEXOL 75 ML: 350 INJECTION, SOLUTION INTRAVENOUS at 07:03

## 2022-04-04 ENCOUNTER — PATIENT MESSAGE (OUTPATIENT)
Dept: OTOLARYNGOLOGY | Facility: CLINIC | Age: 37
End: 2022-04-04
Payer: COMMERCIAL

## 2022-12-06 ENCOUNTER — HOSPITAL ENCOUNTER (INPATIENT)
Facility: HOSPITAL | Age: 37
LOS: 7 days | Discharge: HOME | DRG: 603 | End: 2022-12-15
Attending: EMERGENCY MEDICINE | Admitting: EMERGENCY MEDICINE
Payer: COMMERCIAL

## 2022-12-06 DIAGNOSIS — L23.7 ALLERGIC CONTACT DERMATITIS DUE TO PLANTS, EXCEPT FOOD: ICD-10-CM

## 2022-12-06 DIAGNOSIS — M25.561 PAIN AND SWELLING OF RIGHT KNEE: ICD-10-CM

## 2022-12-06 DIAGNOSIS — L03.115 CELLULITIS OF RIGHT LOWER EXTREMITY: Primary | ICD-10-CM

## 2022-12-06 DIAGNOSIS — M25.461 PAIN AND SWELLING OF RIGHT KNEE: ICD-10-CM

## 2022-12-06 DIAGNOSIS — R07.9 CHEST PAIN: ICD-10-CM

## 2022-12-06 DIAGNOSIS — M79.89 RIGHT LEG SWELLING: ICD-10-CM

## 2022-12-06 PROBLEM — D72.829 LEUKOCYTOSIS: Status: ACTIVE | Noted: 2022-12-06

## 2022-12-06 LAB
ALBUMIN SERPL BCP-MCNC: 2.4 G/DL (ref 3.5–5.2)
ALP SERPL-CCNC: 89 U/L (ref 55–135)
ALT SERPL W/O P-5'-P-CCNC: 33 U/L (ref 10–44)
ANION GAP SERPL CALC-SCNC: 10 MMOL/L (ref 8–16)
ANISOCYTOSIS BLD QL SMEAR: SLIGHT
AST SERPL-CCNC: 21 U/L (ref 10–40)
BASOPHILS # BLD AUTO: 0.07 K/UL (ref 0–0.2)
BASOPHILS NFR BLD: 0.3 % (ref 0–1.9)
BILIRUB SERPL-MCNC: 0.6 MG/DL (ref 0.1–1)
BUN SERPL-MCNC: 23 MG/DL (ref 6–20)
CALCIUM SERPL-MCNC: 9.5 MG/DL (ref 8.7–10.5)
CHLORIDE SERPL-SCNC: 106 MMOL/L (ref 95–110)
CK SERPL-CCNC: 81 U/L (ref 20–200)
CO2 SERPL-SCNC: 26 MMOL/L (ref 23–29)
CREAT SERPL-MCNC: 1 MG/DL (ref 0.5–1.4)
CRP SERPL-MCNC: 280.7 MG/L (ref 0–8.2)
DIFFERENTIAL METHOD: ABNORMAL
DOHLE BOD BLD QL SMEAR: PRESENT
EOSINOPHIL # BLD AUTO: 0 K/UL (ref 0–0.5)
EOSINOPHIL NFR BLD: 0 % (ref 0–8)
ERYTHROCYTE [DISTWIDTH] IN BLOOD BY AUTOMATED COUNT: 12.1 % (ref 11.5–14.5)
ERYTHROCYTE [SEDIMENTATION RATE] IN BLOOD BY PHOTOMETRIC METHOD: 80 MM/HR (ref 0–23)
EST. GFR  (NO RACE VARIABLE): >60 ML/MIN/1.73 M^2
GLUCOSE SERPL-MCNC: 124 MG/DL (ref 70–110)
HCT VFR BLD AUTO: 37.9 % (ref 40–54)
HCV AB SERPL QL IA: NORMAL
HGB BLD-MCNC: 12.5 G/DL (ref 14–18)
HIV 1+2 AB+HIV1 P24 AG SERPL QL IA: NORMAL
IMM GRANULOCYTES # BLD AUTO: 0.39 K/UL (ref 0–0.04)
IMM GRANULOCYTES NFR BLD AUTO: 1.5 % (ref 0–0.5)
INR PPP: 1 (ref 0.8–1.2)
LACTATE SERPL-SCNC: 1 MMOL/L (ref 0.5–2.2)
LYMPHOCYTES # BLD AUTO: 1.8 K/UL (ref 1–4.8)
LYMPHOCYTES NFR BLD: 6.7 % (ref 18–48)
MCH RBC QN AUTO: 30.9 PG (ref 27–31)
MCHC RBC AUTO-ENTMCNC: 33 G/DL (ref 32–36)
MCV RBC AUTO: 94 FL (ref 82–98)
MONOCYTES # BLD AUTO: 0.9 K/UL (ref 0.3–1)
MONOCYTES NFR BLD: 3.3 % (ref 4–15)
NEUTROPHILS # BLD AUTO: 23.4 K/UL (ref 1.8–7.7)
NEUTROPHILS NFR BLD: 88.2 % (ref 38–73)
NRBC BLD-RTO: 0 /100 WBC
PLATELET # BLD AUTO: 332 K/UL (ref 150–450)
PLATELET BLD QL SMEAR: ABNORMAL
PMV BLD AUTO: 10.8 FL (ref 9.2–12.9)
POTASSIUM SERPL-SCNC: 4.1 MMOL/L (ref 3.5–5.1)
PROT SERPL-MCNC: 7.3 G/DL (ref 6–8.4)
PROTHROMBIN TIME: 10.7 SEC (ref 9–12.5)
RBC # BLD AUTO: 4.05 M/UL (ref 4.6–6.2)
SODIUM SERPL-SCNC: 142 MMOL/L (ref 136–145)
TOXIC GRANULES BLD QL SMEAR: PRESENT
WBC # BLD AUTO: 26.48 K/UL (ref 3.9–12.7)

## 2022-12-06 PROCEDURE — 85652 RBC SED RATE AUTOMATED: CPT | Performed by: PHYSICIAN ASSISTANT

## 2022-12-06 PROCEDURE — 86803 HEPATITIS C AB TEST: CPT | Performed by: PHYSICIAN ASSISTANT

## 2022-12-06 PROCEDURE — 63600175 PHARM REV CODE 636 W HCPCS: Performed by: PHYSICIAN ASSISTANT

## 2022-12-06 PROCEDURE — 86140 C-REACTIVE PROTEIN: CPT | Performed by: PHYSICIAN ASSISTANT

## 2022-12-06 PROCEDURE — 85610 PROTHROMBIN TIME: CPT | Performed by: PHYSICIAN ASSISTANT

## 2022-12-06 PROCEDURE — 96374 THER/PROPH/DIAG INJ IV PUSH: CPT

## 2022-12-06 PROCEDURE — G0378 HOSPITAL OBSERVATION PER HR: HCPCS

## 2022-12-06 PROCEDURE — 99285 EMERGENCY DEPT VISIT HI MDM: CPT | Mod: 25

## 2022-12-06 PROCEDURE — 99285 PR EMERGENCY DEPT VISIT,LEVEL V: ICD-10-PCS | Mod: ,,, | Performed by: PHYSICIAN ASSISTANT

## 2022-12-06 PROCEDURE — 83605 ASSAY OF LACTIC ACID: CPT | Performed by: PHYSICIAN ASSISTANT

## 2022-12-06 PROCEDURE — 99285 EMERGENCY DEPT VISIT HI MDM: CPT | Mod: ,,, | Performed by: PHYSICIAN ASSISTANT

## 2022-12-06 PROCEDURE — 87389 HIV-1 AG W/HIV-1&-2 AB AG IA: CPT | Performed by: PHYSICIAN ASSISTANT

## 2022-12-06 PROCEDURE — 87040 BLOOD CULTURE FOR BACTERIA: CPT | Performed by: PHYSICIAN ASSISTANT

## 2022-12-06 PROCEDURE — 99220 PR INITIAL OBSERVATION CARE,LEVL III: CPT | Mod: ,,, | Performed by: FAMILY MEDICINE

## 2022-12-06 PROCEDURE — 96361 HYDRATE IV INFUSION ADD-ON: CPT

## 2022-12-06 PROCEDURE — 25000003 PHARM REV CODE 250: Performed by: INTERNAL MEDICINE

## 2022-12-06 PROCEDURE — 96372 THER/PROPH/DIAG INJ SC/IM: CPT | Performed by: FAMILY MEDICINE

## 2022-12-06 PROCEDURE — 63600175 PHARM REV CODE 636 W HCPCS: Performed by: FAMILY MEDICINE

## 2022-12-06 PROCEDURE — 94761 N-INVAS EAR/PLS OXIMETRY MLT: CPT

## 2022-12-06 PROCEDURE — 63600175 PHARM REV CODE 636 W HCPCS: Performed by: INTERNAL MEDICINE

## 2022-12-06 PROCEDURE — 25000003 PHARM REV CODE 250: Performed by: PHYSICIAN ASSISTANT

## 2022-12-06 PROCEDURE — 99220 PR INITIAL OBSERVATION CARE,LEVL III: ICD-10-PCS | Mod: ,,, | Performed by: FAMILY MEDICINE

## 2022-12-06 PROCEDURE — 85025 COMPLETE CBC W/AUTO DIFF WBC: CPT | Performed by: PHYSICIAN ASSISTANT

## 2022-12-06 PROCEDURE — 80053 COMPREHEN METABOLIC PANEL: CPT | Performed by: PHYSICIAN ASSISTANT

## 2022-12-06 PROCEDURE — 25000003 PHARM REV CODE 250: Performed by: FAMILY MEDICINE

## 2022-12-06 PROCEDURE — 82550 ASSAY OF CK (CPK): CPT | Performed by: PHYSICIAN ASSISTANT

## 2022-12-06 RX ORDER — SODIUM CHLORIDE 0.9 % (FLUSH) 0.9 %
10 SYRINGE (ML) INJECTION
Status: DISCONTINUED | OUTPATIENT
Start: 2022-12-06 | End: 2022-12-15 | Stop reason: HOSPADM

## 2022-12-06 RX ORDER — SODIUM CHLORIDE 0.9 % (FLUSH) 0.9 %
10 SYRINGE (ML) INJECTION EVERY 12 HOURS PRN
Status: DISCONTINUED | OUTPATIENT
Start: 2022-12-06 | End: 2022-12-15 | Stop reason: HOSPADM

## 2022-12-06 RX ORDER — OXYCODONE HYDROCHLORIDE 5 MG/1
5 TABLET ORAL EVERY 6 HOURS PRN
Status: DISCONTINUED | OUTPATIENT
Start: 2022-12-06 | End: 2022-12-07

## 2022-12-06 RX ORDER — AMOXICILLIN 250 MG
1 CAPSULE ORAL 2 TIMES DAILY PRN
Status: DISCONTINUED | OUTPATIENT
Start: 2022-12-06 | End: 2022-12-15 | Stop reason: HOSPADM

## 2022-12-06 RX ORDER — GLUCAGON 1 MG
1 KIT INJECTION
Status: DISCONTINUED | OUTPATIENT
Start: 2022-12-06 | End: 2022-12-15 | Stop reason: HOSPADM

## 2022-12-06 RX ORDER — IBUPROFEN 200 MG
16 TABLET ORAL
Status: DISCONTINUED | OUTPATIENT
Start: 2022-12-06 | End: 2022-12-15 | Stop reason: HOSPADM

## 2022-12-06 RX ORDER — ONDANSETRON 2 MG/ML
4 INJECTION INTRAMUSCULAR; INTRAVENOUS EVERY 8 HOURS PRN
Status: DISCONTINUED | OUTPATIENT
Start: 2022-12-06 | End: 2022-12-15 | Stop reason: HOSPADM

## 2022-12-06 RX ORDER — MAG HYDROX/ALUMINUM HYD/SIMETH 200-200-20
30 SUSPENSION, ORAL (FINAL DOSE FORM) ORAL 4 TIMES DAILY PRN
Status: DISCONTINUED | OUTPATIENT
Start: 2022-12-06 | End: 2022-12-15 | Stop reason: HOSPADM

## 2022-12-06 RX ORDER — KETOROLAC TROMETHAMINE 30 MG/ML
15 INJECTION, SOLUTION INTRAMUSCULAR; INTRAVENOUS EVERY 6 HOURS PRN
Status: DISPENSED | OUTPATIENT
Start: 2022-12-06 | End: 2022-12-09

## 2022-12-06 RX ORDER — HYDROCODONE BITARTRATE AND ACETAMINOPHEN 5; 325 MG/1; MG/1
1 TABLET ORAL
Status: COMPLETED | OUTPATIENT
Start: 2022-12-06 | End: 2022-12-06

## 2022-12-06 RX ORDER — IBUPROFEN 200 MG
24 TABLET ORAL
Status: DISCONTINUED | OUTPATIENT
Start: 2022-12-06 | End: 2022-12-15 | Stop reason: HOSPADM

## 2022-12-06 RX ORDER — LEVOFLOXACIN 500 MG/1
500 TABLET, FILM COATED ORAL DAILY
Status: ON HOLD | COMMUNITY
End: 2022-12-15 | Stop reason: HOSPADM

## 2022-12-06 RX ORDER — MORPHINE SULFATE 4 MG/ML
4 INJECTION, SOLUTION INTRAMUSCULAR; INTRAVENOUS
Status: COMPLETED | OUTPATIENT
Start: 2022-12-06 | End: 2022-12-06

## 2022-12-06 RX ORDER — KETOROLAC TROMETHAMINE 30 MG/ML
10 INJECTION, SOLUTION INTRAMUSCULAR; INTRAVENOUS
Status: COMPLETED | OUTPATIENT
Start: 2022-12-06 | End: 2022-12-06

## 2022-12-06 RX ORDER — IPRATROPIUM BROMIDE AND ALBUTEROL SULFATE 2.5; .5 MG/3ML; MG/3ML
3 SOLUTION RESPIRATORY (INHALATION) EVERY 6 HOURS PRN
Status: DISCONTINUED | OUTPATIENT
Start: 2022-12-06 | End: 2022-12-15 | Stop reason: HOSPADM

## 2022-12-06 RX ORDER — ENOXAPARIN SODIUM 100 MG/ML
40 INJECTION SUBCUTANEOUS EVERY 24 HOURS
Status: DISCONTINUED | OUTPATIENT
Start: 2022-12-06 | End: 2022-12-15 | Stop reason: HOSPADM

## 2022-12-06 RX ORDER — NALOXONE HCL 0.4 MG/ML
0.02 VIAL (ML) INJECTION
Status: DISCONTINUED | OUTPATIENT
Start: 2022-12-06 | End: 2022-12-15 | Stop reason: HOSPADM

## 2022-12-06 RX ORDER — TALC
6 POWDER (GRAM) TOPICAL NIGHTLY PRN
Status: DISCONTINUED | OUTPATIENT
Start: 2022-12-06 | End: 2022-12-15 | Stop reason: HOSPADM

## 2022-12-06 RX ORDER — PREDNISONE 20 MG/1
20 TABLET ORAL 2 TIMES DAILY
Status: ON HOLD | COMMUNITY
End: 2022-12-15 | Stop reason: HOSPADM

## 2022-12-06 RX ORDER — ACETAMINOPHEN 325 MG/1
650 TABLET ORAL EVERY 4 HOURS PRN
Status: DISCONTINUED | OUTPATIENT
Start: 2022-12-06 | End: 2022-12-15 | Stop reason: HOSPADM

## 2022-12-06 RX ADMIN — VANCOMYCIN HYDROCHLORIDE 1500 MG: 1.5 INJECTION, POWDER, LYOPHILIZED, FOR SOLUTION INTRAVENOUS at 05:12

## 2022-12-06 RX ADMIN — HYDROCODONE BITARTRATE AND ACETAMINOPHEN 1 TABLET: 5; 325 TABLET ORAL at 03:12

## 2022-12-06 RX ADMIN — OXYCODONE 5 MG: 5 TABLET ORAL at 07:12

## 2022-12-06 RX ADMIN — MORPHINE SULFATE 4 MG: 4 INJECTION INTRAVENOUS at 05:12

## 2022-12-06 RX ADMIN — CEFTRIAXONE 1 G: 1 INJECTION, POWDER, FOR SOLUTION INTRAMUSCULAR; INTRAVENOUS at 06:12

## 2022-12-06 RX ADMIN — KETOROLAC TROMETHAMINE 10 MG: 30 INJECTION, SOLUTION INTRAMUSCULAR; INTRAVENOUS at 02:12

## 2022-12-06 RX ADMIN — SODIUM CHLORIDE 1000 ML: 0.9 INJECTION, SOLUTION INTRAVENOUS at 03:12

## 2022-12-06 RX ADMIN — VANCOMYCIN HYDROCHLORIDE 1000 MG: 1 INJECTION, POWDER, LYOPHILIZED, FOR SOLUTION INTRAVENOUS at 07:12

## 2022-12-06 RX ADMIN — ENOXAPARIN SODIUM 40 MG: 40 INJECTION SUBCUTANEOUS at 07:12

## 2022-12-06 NOTE — ED TRIAGE NOTES
To the ED with c/o lower right leg swelling  that started on Saturday after hurting his knee on Friday.

## 2022-12-06 NOTE — ED PROVIDER NOTES
Encounter Date: 12/6/2022       History     Chief Complaint   Patient presents with    Leg Pain     Pt c/o left lower leg pain and swelling.  Reports redness behind knee and then blistering to calf. States he has been walking around a lot at [place] past few days.      36 y/o M with no known medical history presents to the ED c/o RLE pain and swelling.  He was treated with azithromycin and IM steroids from urgent care for URI symptoms last week.  He went on vacation to [facility] shortly after - developed fever on Friday evening, 102F with worsening cough.  He went to urgent care in [place] on Saturday 12/3 - was prescribed levaquin 500mg daily and prednisone 20mg BID.  He started having R knee pain and swelling on Friday evening, progressively worsening and acutely worse yesterday.  He has been icing, elevating, wearing compression stocking with no real improvement. No further fever but has been on abx and taking ibuprofen for pain. Not on any blood thinners. No history of DVT/PE, no trauma, no recent surgery, not on any HRT. He does still have a mild cough that is improved. He denies chest pain, SOB, nausea, vomiting, headache, numbness, weakness, paresthesias. No tobacco use.    The history is provided by the patient.   Review of patient's allergies indicates:   Allergen Reactions    Pcn [penicillins]      History reviewed. No pertinent past medical history.  Past Surgical History:   Procedure Laterality Date    APPENDECTOMY      COLONOSCOPY N/A 3/25/2021    Procedure: COLONOSCOPY suprep;  Surgeon: Asad Powers MD;  Location[facility];  Service: Endoscopy;  Laterality: N/A;    INGUINAL HERNIA REPAIR  2018     Family History   Problem Relation Age of Onset    No Known Problems Mother     No Known Problems Father     Melanoma Neg Hx      Social History     Tobacco Use    Smoking status: Never    Smokeless tobacco: Never   Substance Use Topics    Alcohol use: Yes     Comment: socially    Drug use: No      Review of Systems   Constitutional:  Positive for chills, diaphoresis and fever.   HENT:  Negative for congestion and sore throat.    Respiratory:  Positive for cough. Negative for shortness of breath.    Cardiovascular:  Negative for chest pain.   Gastrointestinal:  Negative for abdominal pain, constipation, diarrhea, nausea and vomiting.   Genitourinary:  Negative for dysuria and hematuria.   Musculoskeletal:  Positive for arthralgias, gait problem, joint swelling and myalgias. Negative for back pain.   Skin:  Positive for color change, rash and wound.   Neurological:  Negative for weakness, numbness and headaches.   Psychiatric/Behavioral:  Negative for confusion.      Physical Exam     Initial Vitals [12/06/22 1249]   BP Pulse Resp Temp SpO2   138/84 99 14 98.1 °F (36.7 °C) 97 %      MAP       --         Physical Exam    Nursing note and vitals reviewed.  Constitutional: He appears well-developed and well-nourished. He is not diaphoretic. No distress.   HENT:   Head: Normocephalic and atraumatic.   Neck: Neck supple.   Normal range of motion.  Cardiovascular:  Regular rhythm and normal heart sounds.   Tachycardia present.   Exam reveals no gallop and no friction rub.       No murmur heard.  Pulmonary/Chest: Breath sounds normal. He has no wheezes. He has no rhonchi. He has no rales.   Abdominal: Abdomen is soft. Bowel sounds are normal. There is no abdominal tenderness. There is no rebound and no guarding.   Musculoskeletal:         General: Tenderness and edema present. Normal range of motion.      Cervical back: Normal range of motion and neck supple.      Comments: Significant edema noted to the R leg with associated erythema and warmth. R DP and PT pulses 2+. Normal sensation. Bruising noted to the R posterior knee. No tenderness or defect to the R Achilles tendon.      Neurological: He is alert and oriented to person, place, and time. He has normal strength.   Skin: Skin is warm and dry. No rash noted.  There is erythema.   Psychiatric: He has a normal mood and affect.           ED Course   Procedures  Labs Reviewed   CBC W/ AUTO DIFFERENTIAL - Abnormal; Notable for the following components:       Result Value    WBC 26.48 (*)     RBC 4.05 (*)     Hemoglobin 12.5 (*)     Hematocrit 37.9 (*)     Immature Granulocytes 1.5 (*)     Gran # (ANC) 23.4 (*)     Immature Grans (Abs) 0.39 (*)     Gran % 88.2 (*)     Lymph % 6.7 (*)     Mono % 3.3 (*)     All other components within normal limits   COMPREHENSIVE METABOLIC PANEL - Abnormal; Notable for the following components:    Glucose 124 (*)     BUN 23 (*)     Albumin 2.4 (*)     All other components within normal limits   C-REACTIVE PROTEIN - Abnormal; Notable for the following components:    .7 (*)     All other components within normal limits    Narrative:     ADD ON ESR ORD# [id number] PER COOPER WILSON MD  12/06/2022    15:56    SEDIMENTATION RATE - Abnormal; Notable for the following components:    Sed Rate 80 (*)     All other components within normal limits    Narrative:     ADD ON ESR ORD# [id number] PER COOPER WILSON MD  12/06/2022    15:56    CULTURE, BLOOD   CULTURE, BLOOD   HIV 1 / 2 ANTIBODY    Narrative:     Release to patient->Immediate   HEPATITIS C ANTIBODY    Narrative:     Release to patient->Immediate   LACTIC ACID, PLASMA   PROTIME-INR   SEDIMENTATION RATE   C-REACTIVE PROTEIN          Imaging Results              US Lower Extremity Veins Right (Final result)  Result time 12/06/22 16:52:11      Final result by Nicola Esposito MD (12/06/22 16:52:11)                   Impression:      No evidence of deep venous thrombosis in the right lower extremity.      Electronically signed by: Nicola Esposito MD  Date:    12/06/2022  Time:    16:52               Narrative:    EXAMINATION:  US LOWER EXTREMITY VEINS RIGHT    CLINICAL HISTORY:  Other specified soft tissue disorders    TECHNIQUE:  Duplex and color flow Doppler evaluation and graded  "compression of the right lower extremity veins was performed.    COMPARISON:  None    FINDINGS:  Duplex and color flow Doppler evaluation does not reveal any evidence of acute venous thrombosis in the common femoral, superficial femoral, greater saphenous, popliteal, peroneal, anterior tibial and posterior tibial veins of the right lower extremity.  There is no reflux to suggest valvular incompetence.                                       X-Ray Knee 3 View Right (Final result)  Result time 12/06/22 15:05:09      Final result by Herbert Beltran MD (12/06/22 15:05:09)                   Impression:      See above      Electronically signed by: Herbert Beltran MD  Date:    12/06/2022  Time:    15:05               Narrative:    EXAMINATION:  XR KNEE 3 VIEW RIGHT    CLINICAL HISTORY:  Pain in right knee    TECHNIQUE:  AP, lateral, and Merchant views of the right knee were performed.    COMPARISON:  None    FINDINGS:  Joint spaces maintained.  Bony structures are intact.  No joint effusion is seen.                                       Medications   sodium chloride 0.9% bolus 1,000 mL (has no administration in time range)   vancomycin 1,500 mg in dextrose 5 % 250 mL IVPB (has no administration in time range)   morphine injection 4 mg (has no administration in time range)   ketorolac injection 9.999 mg (9.999 mg Intravenous Given 12/6/22 1456)   HYDROcodone-acetaminophen 5-325 mg per tablet 1 tablet (1 tablet Oral Given 12/6/22 1524)     Medical Decision Making:   History:   Old Medical Records: I decided to obtain old medical records.  Clinical Tests:   Lab Tests: Ordered and Reviewed  Radiological Study: Ordered and Reviewed  Sepsis Perfusion Assessment: "I attest a sepsis perfusion exam was performed within 6 hours of sepsis, severe sepsis, or septic shock presentation, following fluid resuscitation."  Other:   I have discussed this case with another health care provider.       <> Summary of the Discussion: " Hospital medicine     APC / Resident Notes:   38 y/o M with no known medical history presents to the ED c/o RLE pain and swelling.  Tachycardic. Regular rhythm. Lungs clear. Abdomen soft, nontender. Significant swelling noted to the RLE with associated erythema and warmth. +tenderness. Bruising noted to the posterior knee. He is able to actively range the R knee - somewhat limited due to swelling. R DP and PT pulses 2+. Normal sensation. No abrasions/lacerations. Compartments soft. He has been on levaquin. I do not suspect septic arthritis or ishcemic limb. DDx includes but is not limited to cellulitis, DVT, tendon rupture, fracture, dislocation. Will get labs, xray R knee, DVT study.    Leukocytosis noted with WBC 26.48. CMP unremarkable. Lactic acid normal. ESR and CRP elevated.     Xray R knee with no acute abnormality.   No DVT on ultrasound RLE.     Blood culture x 2 pending. IV vancomycin started. Placed in observation to  for further management of cellulitis of the RLE.                   Clinical Impression:   Final diagnoses:  [M25.561, M25.461] Pain and swelling of right knee  [M79.89] Right leg swelling  [L03.115] Cellulitis of right lower extremity (Primary)        ED Disposition Condition    Observation Stable                Gladys Kenyon PA-C  12/06/22 2210

## 2022-12-06 NOTE — ED NOTES
LOC: The patient is awake, alert, and oriented to self, place, time, and situation. Pt is calm and cooperative. Affect is appropriate.  Speech is appropriate and clear.     APPEARANCE: Patient resting uncomfortably, right leg with swelling , redness and pain reported.  in no acute distress.  Patient is clean and well groomed.    SKIN: The skin is warm and dry; color consistent with ethnicity.  Patient has normal skin turgor and moist mucus membranes. Right leg with redness and swelling.     MUSCULOSKELETAL: Patient moving upper and lower extremities without difficulty; pain in right leg.   Denies weakness.     RESPIRATORY: Airway is open and patent. Respirations spontaneous, even, easy, and non-labored.  Patient has a normal effort and rate.  No accessory muscle use noted. Denies cough.     CARDIAC:  No peripheral edema noted. No complaints of chest pain.      ABDOMEN: Soft and non tender to palpation.  No distention noted. Pt denies abdominal pain; denies nausea, vomiting, diarrhea, or constipation.    NEUROLOGIC: Eyes open spontaneously.  Behavior appropriate to situation.  Follows commands; facial expression symmetrical.  Purposeful motor response noted; normal sensation in all extremities. Pt denies headache; denies lightheadedness or dizziness; denies visual disturbances; denies loss of balance; denies unilateral weakness.

## 2022-12-07 LAB
ALBUMIN SERPL BCP-MCNC: 2 G/DL (ref 3.5–5.2)
ALP SERPL-CCNC: 75 U/L (ref 55–135)
ALT SERPL W/O P-5'-P-CCNC: 34 U/L (ref 10–44)
ANION GAP SERPL CALC-SCNC: 7 MMOL/L (ref 8–16)
AST SERPL-CCNC: 28 U/L (ref 10–40)
BASOPHILS # BLD AUTO: 0.04 K/UL (ref 0–0.2)
BASOPHILS NFR BLD: 0.2 % (ref 0–1.9)
BILIRUB SERPL-MCNC: 0.5 MG/DL (ref 0.1–1)
BUN SERPL-MCNC: 26 MG/DL (ref 6–20)
CALCIUM SERPL-MCNC: 8.7 MG/DL (ref 8.7–10.5)
CHLORIDE SERPL-SCNC: 107 MMOL/L (ref 95–110)
CO2 SERPL-SCNC: 26 MMOL/L (ref 23–29)
CREAT SERPL-MCNC: 1.1 MG/DL (ref 0.5–1.4)
DIFFERENTIAL METHOD: ABNORMAL
EOSINOPHIL # BLD AUTO: 0.1 K/UL (ref 0–0.5)
EOSINOPHIL NFR BLD: 0.8 % (ref 0–8)
ERYTHROCYTE [DISTWIDTH] IN BLOOD BY AUTOMATED COUNT: 12.2 % (ref 11.5–14.5)
EST. GFR  (NO RACE VARIABLE): >60 ML/MIN/1.73 M^2
GLUCOSE SERPL-MCNC: 86 MG/DL (ref 70–110)
HCT VFR BLD AUTO: 34.5 % (ref 40–54)
HGB BLD-MCNC: 11.5 G/DL (ref 14–18)
IMM GRANULOCYTES # BLD AUTO: 0.6 K/UL (ref 0–0.04)
IMM GRANULOCYTES NFR BLD AUTO: 3.6 % (ref 0–0.5)
LYMPHOCYTES # BLD AUTO: 3.7 K/UL (ref 1–4.8)
LYMPHOCYTES NFR BLD: 21.8 % (ref 18–48)
MCH RBC QN AUTO: 31.6 PG (ref 27–31)
MCHC RBC AUTO-ENTMCNC: 33.3 G/DL (ref 32–36)
MCV RBC AUTO: 95 FL (ref 82–98)
MONOCYTES # BLD AUTO: 1 K/UL (ref 0.3–1)
MONOCYTES NFR BLD: 5.8 % (ref 4–15)
NEUTROPHILS # BLD AUTO: 11.4 K/UL (ref 1.8–7.7)
NEUTROPHILS NFR BLD: 67.8 % (ref 38–73)
NRBC BLD-RTO: 0 /100 WBC
PLATELET # BLD AUTO: 296 K/UL (ref 150–450)
PMV BLD AUTO: 10.6 FL (ref 9.2–12.9)
POTASSIUM SERPL-SCNC: 3.9 MMOL/L (ref 3.5–5.1)
PROT SERPL-MCNC: 6.3 G/DL (ref 6–8.4)
RBC # BLD AUTO: 3.64 M/UL (ref 4.6–6.2)
SODIUM SERPL-SCNC: 140 MMOL/L (ref 136–145)
WBC # BLD AUTO: 16.85 K/UL (ref 3.9–12.7)

## 2022-12-07 PROCEDURE — 80053 COMPREHEN METABOLIC PANEL: CPT | Performed by: FAMILY MEDICINE

## 2022-12-07 PROCEDURE — 63600175 PHARM REV CODE 636 W HCPCS: Performed by: STUDENT IN AN ORGANIZED HEALTH CARE EDUCATION/TRAINING PROGRAM

## 2022-12-07 PROCEDURE — G0378 HOSPITAL OBSERVATION PER HR: HCPCS

## 2022-12-07 PROCEDURE — 99226 PR SUBSEQUENT OBSERVATION CARE,LEVEL III: CPT | Mod: ,,, | Performed by: INTERNAL MEDICINE

## 2022-12-07 PROCEDURE — 63600175 PHARM REV CODE 636 W HCPCS: Performed by: FAMILY MEDICINE

## 2022-12-07 PROCEDURE — 63600175 PHARM REV CODE 636 W HCPCS: Performed by: INTERNAL MEDICINE

## 2022-12-07 PROCEDURE — A9585 GADOBUTROL INJECTION: HCPCS | Performed by: INTERNAL MEDICINE

## 2022-12-07 PROCEDURE — 25000003 PHARM REV CODE 250: Performed by: FAMILY MEDICINE

## 2022-12-07 PROCEDURE — 25000003 PHARM REV CODE 250: Performed by: INTERNAL MEDICINE

## 2022-12-07 PROCEDURE — 96372 THER/PROPH/DIAG INJ SC/IM: CPT | Performed by: FAMILY MEDICINE

## 2022-12-07 PROCEDURE — 85025 COMPLETE CBC W/AUTO DIFF WBC: CPT | Performed by: FAMILY MEDICINE

## 2022-12-07 PROCEDURE — 25500020 PHARM REV CODE 255: Performed by: INTERNAL MEDICINE

## 2022-12-07 PROCEDURE — 99226 PR SUBSEQUENT OBSERVATION CARE,LEVEL III: ICD-10-PCS | Mod: ,,, | Performed by: INTERNAL MEDICINE

## 2022-12-07 RX ORDER — CLINDAMYCIN PHOSPHATE 600 MG/50ML
600 INJECTION, SOLUTION INTRAVENOUS
Status: DISCONTINUED | OUTPATIENT
Start: 2022-12-07 | End: 2022-12-09

## 2022-12-07 RX ORDER — HYDROMORPHONE HYDROCHLORIDE 1 MG/ML
1 INJECTION, SOLUTION INTRAMUSCULAR; INTRAVENOUS; SUBCUTANEOUS EVERY 6 HOURS PRN
Status: DISCONTINUED | OUTPATIENT
Start: 2022-12-07 | End: 2022-12-08

## 2022-12-07 RX ORDER — ACETAMINOPHEN 500 MG
1000 TABLET ORAL EVERY 6 HOURS PRN
Status: ON HOLD | COMMUNITY
End: 2022-12-15 | Stop reason: HOSPADM

## 2022-12-07 RX ORDER — ALBUTEROL SULFATE 90 UG/1
2 AEROSOL, METERED RESPIRATORY (INHALATION) EVERY 6 HOURS PRN
COMMUNITY
End: 2023-01-17

## 2022-12-07 RX ORDER — FLUTICASONE PROPIONATE 50 MCG
1 SPRAY, SUSPENSION (ML) NASAL 2 TIMES DAILY PRN
COMMUNITY

## 2022-12-07 RX ORDER — PROMETHAZINE HYDROCHLORIDE AND DEXTROMETHORPHAN HYDROBROMIDE 6.25; 15 MG/5ML; MG/5ML
5 SYRUP ORAL EVERY 6 HOURS PRN
COMMUNITY
End: 2023-01-17

## 2022-12-07 RX ORDER — IBUPROFEN 200 MG
400-600 TABLET ORAL EVERY 6 HOURS PRN
COMMUNITY

## 2022-12-07 RX ORDER — GADOBUTROL 604.72 MG/ML
10 INJECTION INTRAVENOUS
Status: COMPLETED | OUTPATIENT
Start: 2022-12-07 | End: 2022-12-07

## 2022-12-07 RX ORDER — OXYCODONE HYDROCHLORIDE 5 MG/1
5 TABLET ORAL EVERY 6 HOURS PRN
Status: DISCONTINUED | OUTPATIENT
Start: 2022-12-07 | End: 2022-12-08

## 2022-12-07 RX ORDER — HYDROMORPHONE HYDROCHLORIDE 1 MG/ML
0.2 INJECTION, SOLUTION INTRAMUSCULAR; INTRAVENOUS; SUBCUTANEOUS ONCE
Status: COMPLETED | OUTPATIENT
Start: 2022-12-07 | End: 2022-12-07

## 2022-12-07 RX ORDER — ASPIRIN 81 MG/1
81 TABLET ORAL ONCE
Status: ON HOLD | COMMUNITY
End: 2023-01-13 | Stop reason: HOSPADM

## 2022-12-07 RX ORDER — PSEUDOEPHEDRINE HCL 30 MG
30 TABLET ORAL EVERY 6 HOURS PRN
COMMUNITY

## 2022-12-07 RX ADMIN — OXYCODONE 5 MG: 5 TABLET ORAL at 06:12

## 2022-12-07 RX ADMIN — HYDROMORPHONE HYDROCHLORIDE 0.2 MG: 1 INJECTION, SOLUTION INTRAMUSCULAR; INTRAVENOUS; SUBCUTANEOUS at 11:12

## 2022-12-07 RX ADMIN — CEFTRIAXONE 1 G: 1 INJECTION, POWDER, FOR SOLUTION INTRAMUSCULAR; INTRAVENOUS at 09:12

## 2022-12-07 RX ADMIN — CLINDAMYCIN IN 5 PERCENT DEXTROSE 600 MG: 12 INJECTION, SOLUTION INTRAVENOUS at 11:12

## 2022-12-07 RX ADMIN — KETOROLAC TROMETHAMINE 15 MG: 30 INJECTION, SOLUTION INTRAMUSCULAR; INTRAVENOUS at 12:12

## 2022-12-07 RX ADMIN — VANCOMYCIN HYDROCHLORIDE 1750 MG: 500 INJECTION, POWDER, LYOPHILIZED, FOR SOLUTION INTRAVENOUS at 07:12

## 2022-12-07 RX ADMIN — VANCOMYCIN HYDROCHLORIDE 1750 MG: 500 INJECTION, POWDER, LYOPHILIZED, FOR SOLUTION INTRAVENOUS at 06:12

## 2022-12-07 RX ADMIN — GADOBUTROL 10 ML: 604.72 INJECTION INTRAVENOUS at 11:12

## 2022-12-07 RX ADMIN — KETOROLAC TROMETHAMINE 15 MG: 30 INJECTION, SOLUTION INTRAMUSCULAR; INTRAVENOUS at 08:12

## 2022-12-07 RX ADMIN — KETOROLAC TROMETHAMINE 15 MG: 30 INJECTION, SOLUTION INTRAMUSCULAR; INTRAVENOUS at 09:12

## 2022-12-07 RX ADMIN — ENOXAPARIN SODIUM 40 MG: 40 INJECTION SUBCUTANEOUS at 06:12

## 2022-12-07 RX ADMIN — OXYCODONE 5 MG: 5 TABLET ORAL at 10:12

## 2022-12-07 RX ADMIN — OXYCODONE 5 MG: 5 TABLET ORAL at 04:12

## 2022-12-07 NOTE — ED NOTES
Report received. Pt sitting up in bed, respirations even/unlabored. NAD noted. Updated pt on poc. Pt requesting pain meds.

## 2022-12-07 NOTE — HPI
Jim Waddell is a 37 y.o. gentleman with no significant PMH who presents with pain and swelling to his RLE for the past 5 days. He was at [place] walking around and noticed an erythematous rash that started on the back of his calf. It slowly worsened, and [place] [facility] [place] Reports a fever up to 102 and worsening swelling that improved with compression stockings. Prior to this episode, he was undergoing treatment for a presumed URI and received steroids and received azithromycin and levaquin as an outpatient. He denies numbness, weakness to his RLE. Labs significant for WBC 16. US without DVT in the RLE. MRI performed with curvilinear rim enhancing fluid collections along the posterior aspects of semimembranosus and medial gastrocnemius, concerning for abscess. He was started on IV antibiotics. General surgery consulted for evaluation of possible drainable abscess    Upon evaluation in his room he is AF and HDS. Reports that his erythema has improved, but has had worsening edema. Otherwise, No palpable areas of fluctuance, though he does have impressive edema. Palpable pulses bilaterally

## 2022-12-07 NOTE — PHARMACY MED REC
"Admission Medication History     The home medication history was taken by Celi Mckenna.    You may go to "Admission" then "Reconcile Home Medications" tabs to review and/or act upon these items.     The home medication list has been updated by the Pharmacy department.   Please read ALL comments highlighted in yellow.   Please address this information as you see fit.    Feel free to contact us if you have any questions or require assistance.      The medications listed below were removed from the home medication list. Please reorder if appropriate:  Patient reports no longer taking the following medication(s):  PANTOPRAZOLE 40 MG    Medications listed below were obtained from: Patient/family    Current Outpatient Medications on File Prior to Encounter   Medication Sig    acetaminophen (TYLENOL) 500 MG tablet   Take 1,000 mg by mouth every 6 (six) hours as needed for Pain (fever).    albuterol (PROVENTIL/VENTOLIN HFA) 90 mcg/actuation inhaler   Inhale 2 puffs into the lungs every 6 (six) hours as needed for Wheezing or Shortness of Breath. Rescue    aspirin (ECOTRIN) 81 MG EC tablet   Take 81 mg by mouth once.    betamethasone valerate 0.1% (VALISONE) 0.1 % Lotn     Apply to the affected area of scalp once to twice daily as needed for scaling/itching.    ciclopirox 1 % shampoo       LATHER SCALP FOR 5-10 MINUTES, THEN RINSE. USE ONCE TO TWICE WEEKLY    fluocinonide 0.05% (LIDEX) 0.05 % cream   Apply topically 2 (two) times daily as needed.    fluticasone propionate (FLONASE) 50 mcg/actuation nasal spray   1 spray by Each Nostril route 2 (two) times daily as needed for Allergies.    ibuprofen (ADVIL,MOTRIN) 200 MG tablet   Take 400-600 mg by mouth every 6 (six) hours as needed for Pain (fever).    levoFLOXacin (LEVAQUIN) 500 MG tablet   Take 500 mg by mouth once daily. For 7 days    predniSONE (DELTASONE) 20 MG tablet   Take 20 mg by mouth 2 (two) times daily. For 7 days    promethazine-dextromethorphan " (PROMETHAZINE-DM) 6.25-15 mg/5 mL Syrp   Take 5 mLs by mouth every 6 (six) hours as needed (cough).    pseudoephedrine (SUDAFED) 30 MG tablet Take 30 mg by mouth every 6 (six) hours as needed for Congestion.       Celi Mckenna  EXT [id number]                  .

## 2022-12-07 NOTE — H&P
[facility] - Emergency Dept  [facility] Medicine  History & Physical    Patient Name: Jim Waddell  MRN: [id number]  Patient Class: OP- Observation  Admission Date: 12/6/2022  Attending Physician: Dilan Valle MD   Primary Care Provider: Primary Doctor No         Patient information was obtained from patient, past medical records and ER records.     Subjective:     Principal Problem:Cellulitis of right lower extremity    Chief Complaint:   Chief Complaint   Patient presents with    Leg Pain     Pt c/o left lower leg pain and swelling.  Reports redness behind knee and then blistering to calf. States he has been walking around a lot at [place] past few days.         HPI: This is a 38yo male with no reported past medical history who presents to the ED with chief complaint of RLE pain and swelling. Patient states that one week ago he was having URI symptoms and was treated with azithromycin and steroid injection at Urgent care. Later same week was still not feeling well and went to [facility] in [place] and states he got another steroid shot and also vitamin infusion. Reports feeling well the next couple days and went to [place]. On Friday patient had new onset fevers with high temp 102.8. Went to  in [place] on 12/3 and got levaquin and prednisone.  Next day patient had new onset lower calf pain and since that time pain has been progressively worsening with associated RLE swelling, erythema, warmth, tenderness. Denies numbness, tingling, nausea, vomiting, chest pain, shortness of breath, weakness, abdominal pain or confusion.     In the ED patient afebrile and hemodynamically stable saturating well on room air. WBC 26.48, LA 1.0. US RLE negative for DVT and Xray negative for acute fracture. Cellulitis skin changes present from low calf to mid medial thigh. Full ROM of all extremities and knee. ESR and CRP significantly elevated. Patient started on iv abx and admitted to the care of medicine for  further evaluation and management.      History reviewed. No pertinent past medical history.    Past Surgical History:   Procedure Laterality Date    APPENDECTOMY      COLONOSCOPY N/A 3/25/2021    Procedure: COLONOSCOPY suprep;  Surgeon: Asad Powers MD;  Location: [facility];  Service: Endoscopy;  Laterality: N/A;    INGUINAL HERNIA REPAIR  2018       Review of patient's allergies indicates:   Allergen Reactions    Pcn [penicillins]        Current Facility-Administered Medications on File Prior to Encounter   Medication    acetaminophen tablet 650 mg    albuterol inhaler 2 puff    diphenhydrAMINE injection 25 mg    EPINEPHrine (EPIPEN) 0.3 mg/0.3 mL pen injection 0.3 mg    methylPREDNISolone sodium succinate injection 40 mg    ondansetron disintegrating tablet 4 mg    sodium chloride 0.9% 500 mL flush bag    sodium chloride 0.9% flush 10 mL     Current Outpatient Medications on File Prior to Encounter   Medication Sig    levoFLOXacin (LEVAQUIN) 500 MG tablet Take 500 mg by mouth every 24 hours.    predniSONE (DELTASONE) 20 MG tablet Take 20 mg by mouth once daily.    betamethasone valerate 0.1% (VALISONE) 0.1 % Lotn AAA of scalp daily-bid prn scaling/itching. (Patient not taking: Reported on 12/20/2021)    ciclopirox 1 % shampoo LATHER SCALP FOR 5-10 MINUTES, THEN RINSE. USE ONCE TO TWICE WEEKLY    fluocinonide 0.05% (LIDEX) 0.05 % cream AAA bid (Patient not taking: Reported on 12/20/2021)    pantoprazole (PROTONIX) 40 MG tablet Take 1 tablet (40 mg total) by mouth once daily. (Patient not taking: Reported on 12/20/2021)     Family History       Problem Relation (Age of Onset)    No Known Problems Mother, Father          Tobacco Use    Smoking status: Never    Smokeless tobacco: Never   Substance and Sexual Activity    Alcohol use: Yes     Comment: socially    Drug use: No    Sexual activity: Yes     Partners: Female     Review of Systems   Constitutional:  Positive for chills and  fever. Negative for appetite change and fatigue.   HENT:  Negative for sore throat and trouble swallowing.    Eyes:  Negative for photophobia and visual disturbance.   Respiratory:  Positive for cough. Negative for shortness of breath and wheezing.    Cardiovascular:  Positive for leg swelling. Negative for chest pain and palpitations.   Gastrointestinal:  Negative for abdominal distention, abdominal pain, diarrhea, nausea and vomiting.   Genitourinary:  Negative for dysuria and hematuria.   Musculoskeletal:  Positive for myalgias. Negative for gait problem, neck pain and neck stiffness.   Skin:  Positive for rash. Negative for wound.   Neurological:  Negative for seizures, syncope, weakness, light-headedness, numbness and headaches.   Psychiatric/Behavioral:  Negative for confusion and decreased concentration.    Objective:     Vital Signs (Most Recent):  Temp: 98.5 °F (36.9 °C) (12/06/22 1523)  Pulse: 102 (12/06/22 1523)  Resp: 16 (12/06/22 1730)  BP: (!) 142/84 (12/06/22 1523)  SpO2: 98 % (12/06/22 1746)   Vital Signs (24h Range):  Temp:  [98.1 °F (36.7 °C)-98.5 °F (36.9 °C)] 98.5 °F (36.9 °C)  Pulse:  [] 102  Resp:  [14-20] 16  SpO2:  [97 %-98 %] 98 %  BP: (138-142)/(84) 142/84     Weight: 102.1 kg (225 lb)  Body mass index is 33.23 kg/m².    Physical Exam  Constitutional:       General: He is not in acute distress.     Appearance: He is not toxic-appearing or diaphoretic.   HENT:      Head: Normocephalic and atraumatic.      Nose: Nose normal.   Eyes:      General: No scleral icterus.     Extraocular Movements: Extraocular movements intact.      Pupils: Pupils are equal, round, and reactive to light.   Cardiovascular:      Rate and Rhythm: Regular rhythm. Tachycardia present.   Pulmonary:      Effort: Pulmonary effort is normal. No respiratory distress.      Breath sounds: No wheezing.   Abdominal:      General: Abdomen is flat. There is no distension.      Palpations: Abdomen is soft.      Tenderness:  There is no abdominal tenderness. There is no guarding.   Musculoskeletal:         General: Normal range of motion.      Cervical back: Normal range of motion and neck supple. No rigidity.      Right lower leg: Edema present.      Left lower leg: No edema.   Skin:     General: Skin is warm and dry.      Comments: RLE warmth, tenderness, erythema, edema   Neurological:      General: No focal deficit present.      Mental Status: He is alert and oriented to person, place, and time.      Cranial Nerves: No cranial nerve deficit.   Psychiatric:         Mood and Affect: Mood normal.         Behavior: Behavior normal.         CRANIAL NERVES     CN III, IV, VI   Pupils are equal, round, and reactive to light.     Significant Labs: All pertinent labs within the past 24 hours have been reviewed.  CBC:   Recent Labs   Lab 12/06/22  1421   WBC 26.48*   HGB 12.5*   HCT 37.9*        CMP:   Recent Labs   Lab 12/06/22  1421      K 4.1      CO2 26   *   BUN 23*   CREATININE 1.0   CALCIUM 9.5   PROT 7.3   ALBUMIN 2.4*   BILITOT 0.6   ALKPHOS 89   AST 21   ALT 33   ANIONGAP 10     Lactic Acid:   Recent Labs   Lab 12/06/22  1421   LACTATE 1.0       Significant Imaging: I have reviewed all pertinent imaging results/findings within the past 24 hours.    Assessment/Plan:     * Cellulitis of right lower extremity  - Cellulitis of RLE ;  Patient SIRS 2/4 however WBC possibly elevated due to recent steroids. Sepsis felt less likely.  - ESR and CRP significantly elevated  - MRI RLE w/w/o pending  - continue vanc and ceftriaxone  - follow up blood cultures  - further management pending clinical course and future study review      Leukocytosis  - possibly secondary to recent steroid use. Also setting of cellulitis/infxn also  - see management above      VTE Risk Mitigation (From admission, onward)         Ordered     enoxaparin injection 40 mg  Daily         12/06/22 5708     IP VTE HIGH RISK PATIENT  Once          12/06/22 1758     Place sequential compression device  Until discontinued         12/06/22 1758     Place sequential compression device  Until discontinued         12/06/22 1745                   Malachi Phan MD  Department of Hospital Medicine   [facility] - Emergency Dept

## 2022-12-07 NOTE — ASSESSMENT & PLAN NOTE
- possibly secondary to recent steroid use. Also setting of cellulitis/infxn also  - see management above

## 2022-12-07 NOTE — ASSESSMENT & PLAN NOTE
Jmi Gregg Waddell is a 37 y.o. gentleman with RLE cellulitis    - on imaging, small fluid collection  - no drainable abscesses on physical exam  - would continue antibiotics for now to see if he improves  - may develop a drainable abscess later

## 2022-12-07 NOTE — SUBJECTIVE & OBJECTIVE
No current facility-administered medications on file prior to encounter.     Current Outpatient Medications on File Prior to Encounter   Medication Sig    acetaminophen (TYLENOL) 500 MG tablet Take 1,000 mg by mouth every 6 (six) hours as needed for Pain (fever).    albuterol (PROVENTIL/VENTOLIN HFA) 90 mcg/actuation inhaler Inhale 2 puffs into the lungs every 6 (six) hours as needed for Wheezing or Shortness of Breath. Rescue    aspirin (ECOTRIN) 81 MG EC tablet Take 81 mg by mouth once.    betamethasone valerate 0.1% (VALISONE) 0.1 % Lotn AAA of scalp daily-bid prn scaling/itching. (Patient taking differently: Apply to the affected area of scalp once to twice daily as needed for scaling/itching.)    ciclopirox 1 % shampoo LATHER SCALP FOR 5-10 MINUTES, THEN RINSE. USE ONCE TO TWICE WEEKLY    fluocinonide 0.05% (LIDEX) 0.05 % cream AAA bid (Patient taking differently: Apply topically 2 (two) times daily as needed.)    fluticasone propionate (FLONASE) 50 mcg/actuation nasal spray 1 spray by Each Nostril route 2 (two) times daily as needed for Allergies.    ibuprofen (ADVIL,MOTRIN) 200 MG tablet Take 400-600 mg by mouth every 6 (six) hours as needed for Pain (fever).    levoFLOXacin (LEVAQUIN) 500 MG tablet Take 500 mg by mouth once daily. For 7 days    predniSONE (DELTASONE) 20 MG tablet Take 20 mg by mouth 2 (two) times daily. For 7 days    promethazine-dextromethorphan (PROMETHAZINE-DM) 6.25-15 mg/5 mL Syrp Take 5 mLs by mouth every 6 (six) hours as needed (cough).    pseudoephedrine (SUDAFED) 30 MG tablet Take 30 mg by mouth every 6 (six) hours as needed for Congestion.    [DISCONTINUED] pantoprazole (PROTONIX) 40 MG tablet Take 1 tablet (40 mg total) by mouth once daily. (Patient not taking: Reported on 12/20/2021)       Review of patient's allergies indicates:   Allergen Reactions    Pcn [penicillins]        History reviewed. No pertinent past medical history.  Past Surgical History:   Procedure Laterality Date     APPENDECTOMY      COLONOSCOPY N/A 3/25/2021    Procedure: COLONOSCOPY suprep;  Surgeon: Asad Powers MD;  Location: [facility];  Service: Endoscopy;  Laterality: N/A;    INGUINAL HERNIA REPAIR  2018     Family History       Problem Relation (Age of Onset)    No Known Problems Mother, Father          Tobacco Use    Smoking status: Never    Smokeless tobacco: Never   Substance and Sexual Activity    Alcohol use: Yes     Comment: socially    Drug use: No    Sexual activity: Yes     Partners: Female     Review of Systems   Constitutional:  Positive for fever. Negative for chills.   HENT:  Negative for hearing loss and sore throat.    Eyes:  Negative for discharge and visual disturbance.   Respiratory:  Negative for chest tightness and shortness of breath.    Cardiovascular:  Positive for leg swelling. Negative for chest pain.   Gastrointestinal:  Negative for abdominal distention, abdominal pain, nausea and vomiting.   Genitourinary:  Negative for difficulty urinating and hematuria.   Musculoskeletal:  Negative for back pain and joint swelling.   Skin:  Positive for color change and rash.   Neurological:  Negative for numbness and headaches.   Objective:     Vital Signs (Most Recent):  Temp: 98.2 °F (36.8 °C) (12/07/22 1029)  Pulse: 78 (12/07/22 1029)  Resp: 16 (12/07/22 1050)  BP: 134/85 (12/07/22 1029)  SpO2: 95 % (12/07/22 1029) Vital Signs (24h Range):  Temp:  [98.2 °F (36.8 °C)-98.8 °F (37.1 °C)] 98.2 °F (36.8 °C)  Pulse:  [] 78  Resp:  [16-20] 16  SpO2:  [95 %-98 %] 95 %  BP: (112-142)/(63-85) 134/85     Weight: 102.1 kg (225 lb)  Body mass index is 33.23 kg/m².    Physical Exam  Constitutional:       General: He is not in acute distress.     Appearance: Normal appearance.   HENT:      Head: Normocephalic and atraumatic.   Cardiovascular:      Rate and Rhythm: Normal rate and regular rhythm.   Pulmonary:      Effort: Pulmonary effort is normal. No respiratory distress.      Breath sounds:  Normal breath sounds.   Abdominal:      General: Abdomen is flat. There is no distension.      Palpations: Abdomen is soft.      Tenderness: There is no abdominal tenderness.   Musculoskeletal:         General: Swelling present.      Right lower leg: Edema present.      Comments: RLE edema. Associated erythema. Small blisters along posterior aspect of calf. No palpable areas of fluctuance. Tender to posterior calf   Neurological:      General: No focal deficit present.      Mental Status: He is alert and oriented to person, place, and time.   Psychiatric:         Behavior: Behavior normal.         Thought Content: Thought content normal.       Significant Labs:  I have reviewed all pertinent lab results within the past 24 hours.  CBC:   Recent Labs   Lab 12/07/22 0436   WBC 16.85*   RBC 3.64*   HGB 11.5*   HCT 34.5*      MCV 95   MCH 31.6*   MCHC 33.3     CMP:   Recent Labs   Lab 12/07/22  0436   GLU 86   CALCIUM 8.7   ALBUMIN 2.0*   PROT 6.3      K 3.9   CO2 26      BUN 26*   CREATININE 1.1   ALKPHOS 75   ALT 34   AST 28   BILITOT 0.5       Significant Diagnostics:  I have reviewed all pertinent imaging results/findings within the past 24 hours.

## 2022-12-07 NOTE — SUBJECTIVE & OBJECTIVE
History reviewed. No pertinent past medical history.    Past Surgical History:   Procedure Laterality Date    APPENDECTOMY      COLONOSCOPY N/A 3/25/2021    Procedure: COLONOSCOPY suprep;  Surgeon: Asad Powers MD;  Location: [facility];  Service: Endoscopy;  Laterality: N/A;    INGUINAL HERNIA REPAIR  2018       Review of patient's allergies indicates:   Allergen Reactions    Pcn [penicillins]        Current Facility-Administered Medications on File Prior to Encounter   Medication    acetaminophen tablet 650 mg    albuterol inhaler 2 puff    diphenhydrAMINE injection 25 mg    EPINEPHrine (EPIPEN) 0.3 mg/0.3 mL pen injection 0.3 mg    methylPREDNISolone sodium succinate injection 40 mg    ondansetron disintegrating tablet 4 mg    sodium chloride 0.9% 500 mL flush bag    sodium chloride 0.9% flush 10 mL     Current Outpatient Medications on File Prior to Encounter   Medication Sig    levoFLOXacin (LEVAQUIN) 500 MG tablet Take 500 mg by mouth every 24 hours.    predniSONE (DELTASONE) 20 MG tablet Take 20 mg by mouth once daily.    betamethasone valerate 0.1% (VALISONE) 0.1 % Lotn AAA of scalp daily-bid prn scaling/itching. (Patient not taking: Reported on 12/20/2021)    ciclopirox 1 % shampoo LATHER SCALP FOR 5-10 MINUTES, THEN RINSE. USE ONCE TO TWICE WEEKLY    fluocinonide 0.05% (LIDEX) 0.05 % cream AAA bid (Patient not taking: Reported on 12/20/2021)    pantoprazole (PROTONIX) 40 MG tablet Take 1 tablet (40 mg total) by mouth once daily. (Patient not taking: Reported on 12/20/2021)     Family History       Problem Relation (Age of Onset)    No Known Problems Mother, Father          Tobacco Use    Smoking status: Never    Smokeless tobacco: Never   Substance and Sexual Activity    Alcohol use: Yes     Comment: socially    Drug use: No    Sexual activity: Yes     Partners: Female     Review of Systems   Constitutional:  Positive for chills and fever. Negative for appetite change and fatigue.   HENT:   Negative for sore throat and trouble swallowing.    Eyes:  Negative for photophobia and visual disturbance.   Respiratory:  Positive for cough. Negative for shortness of breath and wheezing.    Cardiovascular:  Positive for leg swelling. Negative for chest pain and palpitations.   Gastrointestinal:  Negative for abdominal distention, abdominal pain, diarrhea, nausea and vomiting.   Genitourinary:  Negative for dysuria and hematuria.   Musculoskeletal:  Positive for myalgias. Negative for gait problem, neck pain and neck stiffness.   Skin:  Positive for rash. Negative for wound.   Neurological:  Negative for seizures, syncope, weakness, light-headedness, numbness and headaches.   Psychiatric/Behavioral:  Negative for confusion and decreased concentration.    Objective:     Vital Signs (Most Recent):  Temp: 98.5 °F (36.9 °C) (12/06/22 1523)  Pulse: 102 (12/06/22 1523)  Resp: 16 (12/06/22 1730)  BP: (!) 142/84 (12/06/22 1523)  SpO2: 98 % (12/06/22 1746)   Vital Signs (24h Range):  Temp:  [98.1 °F (36.7 °C)-98.5 °F (36.9 °C)] 98.5 °F (36.9 °C)  Pulse:  [] 102  Resp:  [14-20] 16  SpO2:  [97 %-98 %] 98 %  BP: (138-142)/(84) 142/84     Weight: 102.1 kg (225 lb)  Body mass index is 33.23 kg/m².    Physical Exam  Constitutional:       General: He is not in acute distress.     Appearance: He is not toxic-appearing or diaphoretic.   HENT:      Head: Normocephalic and atraumatic.      Nose: Nose normal.   Eyes:      General: No scleral icterus.     Extraocular Movements: Extraocular movements intact.      Pupils: Pupils are equal, round, and reactive to light.   Cardiovascular:      Rate and Rhythm: Regular rhythm. Tachycardia present.   Pulmonary:      Effort: Pulmonary effort is normal. No respiratory distress.      Breath sounds: No wheezing.   Abdominal:      General: Abdomen is flat. There is no distension.      Palpations: Abdomen is soft.      Tenderness: There is no abdominal tenderness. There is no guarding.    Musculoskeletal:         General: Normal range of motion.      Cervical back: Normal range of motion and neck supple. No rigidity.      Right lower leg: Edema present.      Left lower leg: No edema.   Skin:     General: Skin is warm and dry.      Comments: RLE warmth, tenderness, erythema, edema   Neurological:      General: No focal deficit present.      Mental Status: He is alert and oriented to person, place, and time.      Cranial Nerves: No cranial nerve deficit.   Psychiatric:         Mood and Affect: Mood normal.         Behavior: Behavior normal.         CRANIAL NERVES     CN III, IV, VI   Pupils are equal, round, and reactive to light.     Significant Labs: All pertinent labs within the past 24 hours have been reviewed.  CBC:   Recent Labs   Lab 12/06/22  1421   WBC 26.48*   HGB 12.5*   HCT 37.9*        CMP:   Recent Labs   Lab 12/06/22  1421      K 4.1      CO2 26   *   BUN 23*   CREATININE 1.0   CALCIUM 9.5   PROT 7.3   ALBUMIN 2.4*   BILITOT 0.6   ALKPHOS 89   AST 21   ALT 33   ANIONGAP 10     Lactic Acid:   Recent Labs   Lab 12/06/22  1421   LACTATE 1.0       Significant Imaging: I have reviewed all pertinent imaging results/findings within the past 24 hours.

## 2022-12-07 NOTE — HPI
This is a 36yo male with no reported past medical history who presents to the ED with chief complaint of RLE pain and swelling. Patient states that one week ago he was having URI symptoms and was treated with azithromycin and steroid injection at Urgent care. Later same week was still not feeling well and went to [facility] in [place] and states he got another steroid shot and also vitamin infusion. Reports feeling well the next couple days and went to [place]. On Friday patient had new onset fevers with high temp 102.8. Went to  in [place] on 12/3 and got levaquin and prednisone.  Next day patient had new onset lower calf pain and since that time pain has been progressively worsening with associated RLE swelling, erythema, warmth, tenderness. Denies numbness, tingling, nausea, vomiting, chest pain, shortness of breath, weakness, abdominal pain or confusion.     In the ED patient afebrile and hemodynamically stable saturating well on room air. WBC 26.48, LA 1.0. US RLE negative for DVT and Xray negative for acute fracture. Cellulitis skin changes present from low calf to mid medial thigh. Full ROM of all extremities and knee. ESR and CRP significantly elevated. Patient started on iv abx and admitted to the care of medicine for further evaluation and management.

## 2022-12-07 NOTE — PROGRESS NOTES
Pharmacokinetic Initial Assessment: IV Vancomycin    Assessment/Plan:    Vancomycin 1500 mg IVPB x 1 given in ED.  Administer additional vancomycin 1000 mg IVPB x 1 for 2500 mg total load dose.  Continue with vancomycin 1750 mg IVPB every 12 hours.  Desired empiric serum trough concentration is 10 to 20 mcg/mL.  Draw vancomycin trough level 60 min prior to fourth dose on 12/08/2022 at 0430.  Pharmacy will continue to follow and monitor vancomycin.      Please contact pharmacy at extension [phone] with any questions regarding this assessment.     Thank you for the consult,   Paty Lewis       Patient brief summary:  Jim Waddell is a 37 y.o. male initiated on antimicrobial therapy with IV Vancomycin for treatment of suspected skin & soft tissue infection.    Drug Allergies:   Review of patient's allergies indicates:   Allergen Reactions    Pcn [penicillins]        Actual Body Weight:   102.1 kg    Renal Function:   Estimated Creatinine Clearance: 120.3 mL/min (based on SCr of 1 mg/dL).    CBC (last 72 hours):  Recent Labs   Lab Result Units 12/06/22  1421   WBC K/uL 26.48*   Hemoglobin g/dL 12.5*   Hematocrit % 37.9*   Platelets K/uL 332   Gran % % 88.2*   Lymph % % 6.7*   Mono % % 3.3*   Eosinophil % % 0.0   Basophil % % 0.3   Differential Method  Automated       Metabolic Panel (last 72 hours):  Recent Labs   Lab Result Units 12/06/22  1421   Sodium mmol/L 142   Potassium mmol/L 4.1   Chloride mmol/L 106   CO2 mmol/L 26   Glucose mg/dL 124*   BUN mg/dL 23*   Creatinine mg/dL 1.0   Albumin g/dL 2.4*   Total Bilirubin mg/dL 0.6   Alkaline Phosphatase U/L 89   AST U/L 21   ALT U/L 33       Drug levels (last 3 results):  No results for input(s): VANCOMYCINRA, VANCORANDOM, VANCOMYCINPE, VANCOPEAK, VANCOMYCINTR, VANCOTROUGH in the last 72 hours.    Microbiologic Results:  Microbiology Results (last 7 days)       Procedure Component Value Units Date/Time    Blood Culture #1 **CANNOT BE ORDERED STAT**  [[id number]] Collected: 12/06/22 1731    Order Status: Sent Specimen: Blood from Peripheral, Antecubital, Right Updated: 12/06/22 1743    Blood Culture #2 **CANNOT BE ORDERED STAT** [[id number]] Collected: 12/06/22 1651    Order Status: Sent Specimen: Blood from Peripheral, Antecubital, Right Updated: 12/06/22 1700

## 2022-12-07 NOTE — CONSULTS
[facility]  General Surgery  Consult Note    Patient Name: Jim Waddell  MRN: [id number]  Code Status: Full Code  Admission Date: 12/6/2022  Hospital Length of Stay: 0 days  Attending Physician: Dilan Valle MD  Primary Care Provider: Primary Doctor No    Patient information was obtained from patient and ER records.     Inpatient consult to General Surgery  Consult performed by: Timbo Haskins MD  Consult ordered by: Dilan Valle MD        Subjective:     Principal Problem: Cellulitis of right lower extremity    History of Present Illness: Jim Waddell is a 37 y.o. gentleman with no significant PMH who presents with pain and swelling to his RLE for the past 5 days. He was at [place] walking around and noticed an erythematous rash that started on the back of his calf. It slowly worsened, and he presented to [facility] on 12/6. Reports a fever up to 102 and worsening swelling that improved with compression stockings. Prior to this episode, he was undergoing treatment for a presumed URI and received steroids and received azithromycin and levaquin as an outpatient. He denies numbness, weakness to his RLE. Labs significant for WBC 16. US without DVT in the RLE. MRI performed with curvilinear rim enhancing fluid collections along the posterior aspects of semimembranosus and medial gastrocnemius, concerning for abscess. He was started on IV antibiotics. General surgery consulted for evaluation of possible drainable abscess    Upon evaluation in his room he is AF and HDS. Reports that his erythema has improved, but has had worsening edema. Otherwise, No palpable areas of fluctuance, though he does have impressive edema. Palpable pulses bilaterally              No current facility-administered medications on file prior to encounter.     Current Outpatient Medications on File Prior to Encounter   Medication Sig    acetaminophen (TYLENOL) 500 MG tablet Take 1,000 mg by mouth every 6 (six) hours  as needed for Pain (fever).    albuterol (PROVENTIL/VENTOLIN HFA) 90 mcg/actuation inhaler Inhale 2 puffs into the lungs every 6 (six) hours as needed for Wheezing or Shortness of Breath. Rescue    aspirin (ECOTRIN) 81 MG EC tablet Take 81 mg by mouth once.    betamethasone valerate 0.1% (VALISONE) 0.1 % Lotn AAA of scalp daily-bid prn scaling/itching. (Patient taking differently: Apply to the affected area of scalp once to twice daily as needed for scaling/itching.)    ciclopirox 1 % shampoo LATHER SCALP FOR 5-10 MINUTES, THEN RINSE. USE ONCE TO TWICE WEEKLY    fluocinonide 0.05% (LIDEX) 0.05 % cream AAA bid (Patient taking differently: Apply topically 2 (two) times daily as needed.)    fluticasone propionate (FLONASE) 50 mcg/actuation nasal spray 1 spray by Each Nostril route 2 (two) times daily as needed for Allergies.    ibuprofen (ADVIL,MOTRIN) 200 MG tablet Take 400-600 mg by mouth every 6 (six) hours as needed for Pain (fever).    levoFLOXacin (LEVAQUIN) 500 MG tablet Take 500 mg by mouth once daily. For 7 days    predniSONE (DELTASONE) 20 MG tablet Take 20 mg by mouth 2 (two) times daily. For 7 days    promethazine-dextromethorphan (PROMETHAZINE-DM) 6.25-15 mg/5 mL Syrp Take 5 mLs by mouth every 6 (six) hours as needed (cough).    pseudoephedrine (SUDAFED) 30 MG tablet Take 30 mg by mouth every 6 (six) hours as needed for Congestion.    [DISCONTINUED] pantoprazole (PROTONIX) 40 MG tablet Take 1 tablet (40 mg total) by mouth once daily. (Patient not taking: Reported on 12/20/2021)       Review of patient's allergies indicates:   Allergen Reactions    Pcn [penicillins]        History reviewed. No pertinent past medical history.  Past Surgical History:   Procedure Laterality Date    APPENDECTOMY      COLONOSCOPY N/A 3/25/2021    Procedure: COLONOSCOPY suprep;  Surgeon: Asad Powers MD;  Location: [facility];  Service: Endoscopy;  Laterality: N/A;    INGUINAL HERNIA REPAIR  2018      Family History       Problem Relation (Age of Onset)    No Known Problems Mother, Father          Tobacco Use    Smoking status: Never    Smokeless tobacco: Never   Substance and Sexual Activity    Alcohol use: Yes     Comment: socially    Drug use: No    Sexual activity: Yes     Partners: Female     Review of Systems   Constitutional:  Positive for fever. Negative for chills.   HENT:  Negative for hearing loss and sore throat.    Eyes:  Negative for discharge and visual disturbance.   Respiratory:  Negative for chest tightness and shortness of breath.    Cardiovascular:  Positive for leg swelling. Negative for chest pain.   Gastrointestinal:  Negative for abdominal distention, abdominal pain, nausea and vomiting.   Genitourinary:  Negative for difficulty urinating and hematuria.   Musculoskeletal:  Negative for back pain and joint swelling.   Skin:  Positive for color change and rash.   Neurological:  Negative for numbness and headaches.   Objective:     Vital Signs (Most Recent):  Temp: 98.2 °F (36.8 °C) (12/07/22 1029)  Pulse: 78 (12/07/22 1029)  Resp: 16 (12/07/22 1050)  BP: 134/85 (12/07/22 1029)  SpO2: 95 % (12/07/22 1029) Vital Signs (24h Range):  Temp:  [98.2 °F (36.8 °C)-98.8 °F (37.1 °C)] 98.2 °F (36.8 °C)  Pulse:  [] 78  Resp:  [16-20] 16  SpO2:  [95 %-98 %] 95 %  BP: (112-142)/(63-85) 134/85     Weight: 102.1 kg (225 lb)  Body mass index is 33.23 kg/m².    Physical Exam  Constitutional:       General: He is not in acute distress.     Appearance: Normal appearance.   HENT:      Head: Normocephalic and atraumatic.   Cardiovascular:      Rate and Rhythm: Normal rate and regular rhythm.   Pulmonary:      Effort: Pulmonary effort is normal. No respiratory distress.      Breath sounds: Normal breath sounds.   Abdominal:      General: Abdomen is flat. There is no distension.      Palpations: Abdomen is soft.      Tenderness: There is no abdominal tenderness.   Musculoskeletal:         General:  Swelling present.      Right lower leg: Edema present.      Comments: RLE edema. Associated erythema. Small blisters along posterior aspect of calf. No palpable areas of fluctuance. Tender to posterior calf   Neurological:      General: No focal deficit present.      Mental Status: He is alert and oriented to person, place, and time.   Psychiatric:         Behavior: Behavior normal.         Thought Content: Thought content normal.       Significant Labs:  I have reviewed all pertinent lab results within the past 24 hours.  CBC:   Recent Labs   Lab 12/07/22  0436   WBC 16.85*   RBC 3.64*   HGB 11.5*   HCT 34.5*      MCV 95   MCH 31.6*   MCHC 33.3     CMP:   Recent Labs   Lab 12/07/22 0436   GLU 86   CALCIUM 8.7   ALBUMIN 2.0*   PROT 6.3      K 3.9   CO2 26      BUN 26*   CREATININE 1.1   ALKPHOS 75   ALT 34   AST 28   BILITOT 0.5       Significant Diagnostics:  I have reviewed all pertinent imaging results/findings within the past 24 hours.      Assessment/Plan:     * Cellulitis of right lower extremity  Jim Waddell is a 37 y.o. gentleman with RLE cellulitis    - on imaging, small fluid collection  - no drainable abscesses on physical exam  - would continue antibiotics for now to see if he improves  - may develop a drainable abscess later      VTE Risk Mitigation (From admission, onward)         Ordered     enoxaparin injection 40 mg  Daily         12/06/22 1758     IP VTE HIGH RISK PATIENT  Once         12/06/22 1758     Place sequential compression device  Until discontinued         12/06/22 1758     Place sequential compression device  Until discontinued         12/06/22 1745                Thank you for your consult. I will follow-up with patient. Please contact us if you have any additional questions.    Timbo Haskins MD  General Surgery  [facility]

## 2022-12-07 NOTE — ASSESSMENT & PLAN NOTE
- Cellulitis of RLE ;  Patient SIRS 2/4 however WBC possibly elevated due to recent steroids. Sepsis felt less likely.  - ESR and CRP significantly elevated  - MRI RLE w/w/o pending  - continue vanc and ceftriaxone  - follow up blood cultures  - further management pending clinical course and future study review

## 2022-12-08 LAB
ALBUMIN SERPL BCP-MCNC: 1.9 G/DL (ref 3.5–5.2)
ALP SERPL-CCNC: 75 U/L (ref 55–135)
ALT SERPL W/O P-5'-P-CCNC: 41 U/L (ref 10–44)
ANION GAP SERPL CALC-SCNC: 8 MMOL/L (ref 8–16)
ANISOCYTOSIS BLD QL SMEAR: SLIGHT
AST SERPL-CCNC: 28 U/L (ref 10–40)
BASOPHILS NFR BLD: 0 % (ref 0–1.9)
BILIRUB SERPL-MCNC: 0.6 MG/DL (ref 0.1–1)
BUN SERPL-MCNC: 21 MG/DL (ref 6–20)
CALCIUM SERPL-MCNC: 8.2 MG/DL (ref 8.7–10.5)
CHLORIDE SERPL-SCNC: 106 MMOL/L (ref 95–110)
CO2 SERPL-SCNC: 22 MMOL/L (ref 23–29)
CREAT SERPL-MCNC: 0.9 MG/DL (ref 0.5–1.4)
DIFFERENTIAL METHOD: ABNORMAL
EOSINOPHIL NFR BLD: 0 % (ref 0–8)
ERYTHROCYTE [DISTWIDTH] IN BLOOD BY AUTOMATED COUNT: 11.9 % (ref 11.5–14.5)
EST. GFR  (NO RACE VARIABLE): >60 ML/MIN/1.73 M^2
GLUCOSE SERPL-MCNC: 89 MG/DL (ref 70–110)
HCT VFR BLD AUTO: 35.5 % (ref 40–54)
HGB BLD-MCNC: 11.9 G/DL (ref 14–18)
IMM GRANULOCYTES # BLD AUTO: ABNORMAL K/UL (ref 0–0.04)
IMM GRANULOCYTES NFR BLD AUTO: ABNORMAL % (ref 0–0.5)
LYMPHOCYTES NFR BLD: 26 % (ref 18–48)
MAGNESIUM SERPL-MCNC: 1.9 MG/DL (ref 1.6–2.6)
MCH RBC QN AUTO: 30.7 PG (ref 27–31)
MCHC RBC AUTO-ENTMCNC: 33.5 G/DL (ref 32–36)
MCV RBC AUTO: 92 FL (ref 82–98)
METAMYELOCYTES NFR BLD MANUAL: 2 %
MONOCYTES NFR BLD: 5 % (ref 4–15)
NEUTROPHILS NFR BLD: 66 % (ref 38–73)
NEUTS BAND NFR BLD MANUAL: 1 %
NRBC BLD-RTO: 0 /100 WBC
PLATELET # BLD AUTO: 336 K/UL (ref 150–450)
PLATELET BLD QL SMEAR: ABNORMAL
PMV BLD AUTO: 10.2 FL (ref 9.2–12.9)
POIKILOCYTOSIS BLD QL SMEAR: SLIGHT
POTASSIUM SERPL-SCNC: 3.9 MMOL/L (ref 3.5–5.1)
PROT SERPL-MCNC: 5.9 G/DL (ref 6–8.4)
RBC # BLD AUTO: 3.87 M/UL (ref 4.6–6.2)
SODIUM SERPL-SCNC: 136 MMOL/L (ref 136–145)
WBC # BLD AUTO: 13.2 K/UL (ref 3.9–12.7)

## 2022-12-08 PROCEDURE — 25000003 PHARM REV CODE 250: Performed by: INTERNAL MEDICINE

## 2022-12-08 PROCEDURE — 63600175 PHARM REV CODE 636 W HCPCS: Performed by: INTERNAL MEDICINE

## 2022-12-08 PROCEDURE — 85027 COMPLETE CBC AUTOMATED: CPT | Performed by: FAMILY MEDICINE

## 2022-12-08 PROCEDURE — 63600175 PHARM REV CODE 636 W HCPCS: Performed by: FAMILY MEDICINE

## 2022-12-08 PROCEDURE — 11000001 HC ACUTE MED/SURG PRIVATE ROOM

## 2022-12-08 PROCEDURE — 36415 COLL VENOUS BLD VENIPUNCTURE: CPT | Performed by: FAMILY MEDICINE

## 2022-12-08 PROCEDURE — 83735 ASSAY OF MAGNESIUM: CPT | Performed by: INTERNAL MEDICINE

## 2022-12-08 PROCEDURE — 99233 SBSQ HOSP IP/OBS HIGH 50: CPT | Mod: ,,, | Performed by: FAMILY MEDICINE

## 2022-12-08 PROCEDURE — 85007 BL SMEAR W/DIFF WBC COUNT: CPT | Performed by: FAMILY MEDICINE

## 2022-12-08 PROCEDURE — 80053 COMPREHEN METABOLIC PANEL: CPT | Performed by: FAMILY MEDICINE

## 2022-12-08 PROCEDURE — 25000003 PHARM REV CODE 250: Performed by: FAMILY MEDICINE

## 2022-12-08 PROCEDURE — 99233 PR SUBSEQUENT HOSPITAL CARE,LEVL III: ICD-10-PCS | Mod: ,,, | Performed by: FAMILY MEDICINE

## 2022-12-08 RX ORDER — HYDROMORPHONE HYDROCHLORIDE 1 MG/ML
1 INJECTION, SOLUTION INTRAMUSCULAR; INTRAVENOUS; SUBCUTANEOUS
Status: DISCONTINUED | OUTPATIENT
Start: 2022-12-08 | End: 2022-12-12

## 2022-12-08 RX ORDER — OXYCODONE HYDROCHLORIDE 5 MG/1
5 TABLET ORAL EVERY 4 HOURS PRN
Status: DISCONTINUED | OUTPATIENT
Start: 2022-12-08 | End: 2022-12-12

## 2022-12-08 RX ORDER — OXYCODONE AND ACETAMINOPHEN 10; 325 MG/1; MG/1
1 TABLET ORAL EVERY 4 HOURS PRN
Status: DISCONTINUED | OUTPATIENT
Start: 2022-12-08 | End: 2022-12-12

## 2022-12-08 RX ADMIN — CLINDAMYCIN IN 5 PERCENT DEXTROSE 600 MG: 12 INJECTION, SOLUTION INTRAVENOUS at 02:12

## 2022-12-08 RX ADMIN — OXYCODONE 5 MG: 5 TABLET ORAL at 05:12

## 2022-12-08 RX ADMIN — OXYCODONE 5 MG: 5 TABLET ORAL at 11:12

## 2022-12-08 RX ADMIN — CLINDAMYCIN IN 5 PERCENT DEXTROSE 600 MG: 12 INJECTION, SOLUTION INTRAVENOUS at 11:12

## 2022-12-08 RX ADMIN — CEFTRIAXONE 1 G: 1 INJECTION, POWDER, FOR SOLUTION INTRAMUSCULAR; INTRAVENOUS at 09:12

## 2022-12-08 RX ADMIN — HYDROMORPHONE HYDROCHLORIDE 1 MG: 1 INJECTION, SOLUTION INTRAMUSCULAR; INTRAVENOUS; SUBCUTANEOUS at 08:12

## 2022-12-08 RX ADMIN — ACETAMINOPHEN 650 MG: 325 TABLET ORAL at 09:12

## 2022-12-08 RX ADMIN — CLINDAMYCIN IN 5 PERCENT DEXTROSE 600 MG: 12 INJECTION, SOLUTION INTRAVENOUS at 08:12

## 2022-12-08 RX ADMIN — OXYCODONE 5 MG: 5 TABLET ORAL at 04:12

## 2022-12-08 RX ADMIN — ENOXAPARIN SODIUM 40 MG: 40 INJECTION SUBCUTANEOUS at 05:12

## 2022-12-08 RX ADMIN — HYDROMORPHONE HYDROCHLORIDE 1 MG: 1 INJECTION, SOLUTION INTRAMUSCULAR; INTRAVENOUS; SUBCUTANEOUS at 02:12

## 2022-12-08 NOTE — SUBJECTIVE & OBJECTIVE
Interval History: AF, HDS. Erythema improving. Continued edema.     Medications:  Continuous Infusions:  Scheduled Meds:   cefTRIAXone (ROCEPHIN) IVPB  1 g Intravenous Q24H    clindamycin (CLEOCIN) IVPB  600 mg Intravenous Q8H    enoxaparin  40 mg Subcutaneous Daily     PRN Meds:acetaminophen, albuterol-ipratropium, aluminum-magnesium hydroxide-simethicone, dextrose 10%, dextrose 10%, glucagon (human recombinant), glucose, glucose, HYDROmorphone, ketorolac, melatonin, naloxone, ondansetron, oxyCODONE, senna-docusate 8.6-50 mg, sodium chloride 0.9%, sodium chloride 0.9%     Review of patient's allergies indicates:   Allergen Reactions    Pcn [penicillins]      Objective:     Vital Signs (Most Recent):  Temp: 99.3 °F (37.4 °C) (12/08/22 0757)  Pulse: 91 (12/08/22 0757)  Resp: 18 (12/08/22 0823)  BP: 126/63 (12/08/22 0757)  SpO2: 95 % (12/08/22 0757) Vital Signs (24h Range):  Temp:  [98 °F (36.7 °C)-99.3 °F (37.4 °C)] 99.3 °F (37.4 °C)  Pulse:  [75-97] 91  Resp:  [16-20] 18  SpO2:  [93 %-98 %] 95 %  BP: (114-134)/(63-85) 126/63     Weight: 102.1 kg (225 lb)  Body mass index is 33.23 kg/m².    Intake/Output - Last 3 Shifts         12/06 0700 12/07 0659 12/07 0700 12/08 0659 12/08 0700 12/09 0659    IV Piggyback 1300 500     Total Intake(mL/kg) 1300 (12.7) 500 (4.9)     Net +1300 +500                    Physical Exam  Constitutional:       General: He is not in acute distress.     Appearance: Normal appearance.   HENT:      Head: Normocephalic and atraumatic.   Cardiovascular:      Rate and Rhythm: Normal rate and regular rhythm.   Pulmonary:      Effort: Pulmonary effort is normal. No respiratory distress.      Breath sounds: Normal breath sounds.   Abdominal:      General: Abdomen is flat. There is no distension.      Palpations: Abdomen is soft.      Tenderness: There is no abdominal tenderness.   Musculoskeletal:         General: Swelling present.      Right lower leg: Edema present.      Comments: RLE edema.  Associated erythema. Small blisters along posterior aspect of calf. No palpable areas of fluctuance. Tender to posterior calf   Neurological:      General: No focal deficit present.      Mental Status: He is alert and oriented to person, place, and time.   Psychiatric:         Behavior: Behavior normal.         Thought Content: Thought content normal.       Significant Labs:  I have reviewed all pertinent lab results within the past 24 hours.  CBC:   Recent Labs   Lab 12/08/22  0344   WBC 13.20*   RBC 3.87*   HGB 11.9*   HCT 35.5*      MCV 92   MCH 30.7   MCHC 33.5     CMP:   Recent Labs   Lab 12/08/22  0344   GLU 89   CALCIUM 8.2*   ALBUMIN 1.9*   PROT 5.9*      K 3.9   CO2 22*      BUN 21*   CREATININE 0.9   ALKPHOS 75   ALT 41   AST 28   BILITOT 0.6       Significant Diagnostics:  I have reviewed all pertinent imaging results/findings within the past 24 hours.

## 2022-12-08 NOTE — PLAN OF CARE
[facility]  Initial Discharge Assessment       Primary Care Provider: Primary Doctor No    Admission Diagnosis: Cellulitis of right lower extremity [L03.115]  Chest pain [R07.9]  Right leg swelling [M79.89]  Pain and swelling of right knee [M25.561, M25.461]    Admission Date: 12/6/2022  Expected Discharge Date:          Payor: [employer] / Plan: [facility] CHOICE PLUS / Product Type: Commercial /     Extended Emergency Contact Information  Primary Emergency Contact: Gabrielle Waddell  Mobile Phone: [phone]  Relation: Spouse   needed? No    Discharge Plan A: (P) Home with family  Discharge Plan B: (P) Home with family      [id number] - [place], [place] - [address] [facility] [place] [address]  [address]  [place] [address]  Phone: [phone] Fax: [phone]    [id number] [place] [address] AT [facility] [address] [address]  Phone: [phone] Fax: [phone]      Initial Assessment (most recent)       Adult Discharge Assessment - 12/08/22 1222          Discharge Assessment    Assessment Type Discharge Planning Assessment (P)      Confirmed/corrected address, phone number and insurance Yes (P)      Confirmed Demographics Correct on Facesheet (P)      Source of Information patient (P)      People in Home spouse;child(natan), dependent (P)      Facility Arrived From: Home (P)      Do you expect to return to your current living situation? Yes (P)      Prior to hospitilization cognitive status: Alert/Oriented (P)      Current cognitive status: Alert/Oriented (P)      Equipment Currently Used at Home none (P)      Readmission within 30 days? No (P)      Patient currently being followed by outpatient case management? No (P)      Do you have prescription coverage? Yes (P)      Do you have any problems affording any of your prescribed medications? TBD (P)      Are you  on dialysis? No (P)      Do you take coumadin? No (P)      Discharge Plan A Home with family (P)      Discharge Plan B Home with family (P)         Social Connections    Are you , , , , never , or living with a partner?  (P)                                   SW completed Discharge Planning Assessment with patient via bedside. Discharge planning booklet given to patient/family and whiteboard updated with CAM and phone #. All questions answered.    Patient reported that his wife will provide transportation upon discharge.     Patient reported that he lives at home with his wife and children, and prior to hospitalization he was independent with his ADL's. Patient reported that he is not on dialysis and does not go to a Coumadin clinic.      Patient lives in a two story home, and reported that he does not have difficulty climbing the stairs.       Marly George LMSW  [facility]  Ext. [phone]

## 2022-12-08 NOTE — PROGRESS NOTES
[facility] Medicine  Daily progress note    Patient Name: Jim Waddell  MRN: [id number]  Patient Class: OP- Observation  Admission Date: 12/6/2022  Attending Physician: Dilan Valle MD   Primary Care Provider: Primary Doctor No         Patient information was obtained from patient, past medical records and ER records.     Subjective:     Principal Problem:Cellulitis of right lower extremity    Chief Complaint:   Chief Complaint   Patient presents with    Leg Pain     Pt c/o left lower leg pain and swelling.  Reports redness behind knee and then blistering to calf. States he has been walking around a lot at [place] past few days.         HPI: This is a 36yo male with no reported past medical history who presents to the ED with chief complaint of RLE pain and swelling. Patient states that one week ago he was having URI symptoms and was treated with azithromycin and steroid injection at Urgent care. Later same week was still not feeling well and went to [facility] Room in [place] and states he got another steroid shot and also vitamin infusion. Reports feeling well the next couple days and went to [place]. On Friday patient had new onset fevers with high temp 102.8. Went to  in [place] on 12/3 and got levaquin and prednisone.  Next day patient had new onset lower calf pain and since that time pain has been progressively worsening with associated RLE swelling, erythema, warmth, tenderness. Denies numbness, tingling, nausea, vomiting, chest pain, shortness of breath, weakness, abdominal pain or confusion.     In the ED patient afebrile and hemodynamically stable saturating well on room air. WBC 26.48, LA 1.0. US RLE negative for DVT and Xray negative for acute fracture. Cellulitis skin changes present from low calf to mid medial thigh. Full ROM of all extremities and knee. ESR and CRP significantly elevated. Patient started on iv abx and admitted to the care of medicine for further evaluation  and management.    Subjective:    No chest pain, shortness of breath, lightheadedness. Tolerating oral intake.   Still with pain of right lower extremity.        NAD, AO3  NC, AT  RRR  CTAB  SNTND+BS  Right lower extremity with redness and erythema without purulent drainage.  Redness has receded from line drawn on admission.  Due to edema, the area has begun to weep.  Compartments remain compressible.  Distal pulses present.  PERRL    Vitals, labs and radiographs from past 24h reviewed and personally interpreted.     Assessment/Plan:     * Cellulitis of right lower extremity  - Cellulitis of RLE ;  Patient SIRS 2/4 however WBC possibly elevated due to recent steroids. Sepsis felt less likely.  - ESR and CRP significantly elevated    - follow up blood cultures  - general surgery consulted regarding the possibility of a fluid collection.  No drainable abscess identified at this point.  That service will continue to follow   -on admission vancomycin and ceftriaxone were initiated.  As of 12/7, this will be transitioned to ceftriaxone and clindamycin.      Leukocytosis  - possibly secondary to recent steroid use. Also setting of cellulitis/infxn also  - see management above    VTE Risk Mitigation (From admission, onward)           Ordered     enoxaparin injection 40 mg  Daily         12/06/22 1758     IP VTE HIGH RISK PATIENT  Once         12/06/22 1758     Place sequential compression device  Until discontinued         12/06/22 1758     Place sequential compression device  Until discontinued         12/06/22 1745                       Dilan Valle MD  Department of Hospital Medicine   [facility] Emergency Dept

## 2022-12-08 NOTE — PROGRESS NOTES
Pharmacokinetic Assessment Follow Up: IV Vancomycin    Vancomycin order has been discontinued. Pharmacy will sign off. Please reconsult as needed! Thank you!    Please contact pharmacy for questions regarding this assessment.    Thank you for the consult,   Brianne Dobson

## 2022-12-08 NOTE — PROGRESS NOTES
[facility]  General Surgery  Progress Note    Subjective:     History of Present Illness:  Jim Waddell is a 37 y.o. gentleman with no significant PMH who presents with pain and swelling to his RLE for the past 5 days. He was at [place] walking around and noticed an erythematous rash that started on the back of his calf. It slowly worsened, and he presented to [facility] on 12/6. Reports a fever up to 102 and worsening swelling that improved with compression stockings. Prior to this episode, he was undergoing treatment for a presumed URI and received steroids and received azithromycin and levaquin as an outpatient. He denies numbness, weakness to his RLE. Labs significant for WBC 16. US without DVT in the RLE. MRI performed with curvilinear rim enhancing fluid collections along the posterior aspects of semimembranosus and medial gastrocnemius, concerning for abscess. He was started on IV antibiotics. General surgery consulted for evaluation of possible drainable abscess    Upon evaluation in his room he is AF and HDS. Reports that his erythema has improved, but has had worsening edema. Otherwise, No palpable areas of fluctuance, though he does have impressive edema. Palpable pulses bilaterally              Post-Op Info:  * No surgery found *         Interval History: AF, HDS. Erythema improving. Continued edema.     Medications:  Continuous Infusions:  Scheduled Meds:   cefTRIAXone (ROCEPHIN) IVPB  1 g Intravenous Q24H    clindamycin (CLEOCIN) IVPB  600 mg Intravenous Q8H    enoxaparin  40 mg Subcutaneous Daily     PRN Meds:acetaminophen, albuterol-ipratropium, aluminum-magnesium hydroxide-simethicone, dextrose 10%, dextrose 10%, glucagon (human recombinant), glucose, glucose, HYDROmorphone, ketorolac, melatonin, naloxone, ondansetron, oxyCODONE, senna-docusate 8.6-50 mg, sodium chloride 0.9%, sodium chloride 0.9%     Review of patient's allergies indicates:   Allergen Reactions    Pcn  [penicillins]      Objective:     Vital Signs (Most Recent):  Temp: 99.3 °F (37.4 °C) (12/08/22 0757)  Pulse: 91 (12/08/22 0757)  Resp: 18 (12/08/22 0823)  BP: 126/63 (12/08/22 0757)  SpO2: 95 % (12/08/22 0757) Vital Signs (24h Range):  Temp:  [98 °F (36.7 °C)-99.3 °F (37.4 °C)] 99.3 °F (37.4 °C)  Pulse:  [75-97] 91  Resp:  [16-20] 18  SpO2:  [93 %-98 %] 95 %  BP: (114-134)/(63-85) 126/63     Weight: 102.1 kg (225 lb)  Body mass index is 33.23 kg/m².    Intake/Output - Last 3 Shifts         12/06 0700 12/07 0659 12/07 0700 12/08 0659 12/08 0700 12/09 0659    IV Piggyback 1300 500     Total Intake(mL/kg) 1300 (12.7) 500 (4.9)     Net +1300 +500                    Physical Exam  Constitutional:       General: He is not in acute distress.     Appearance: Normal appearance.   HENT:      Head: Normocephalic and atraumatic.   Cardiovascular:      Rate and Rhythm: Normal rate and regular rhythm.   Pulmonary:      Effort: Pulmonary effort is normal. No respiratory distress.      Breath sounds: Normal breath sounds.   Abdominal:      General: Abdomen is flat. There is no distension.      Palpations: Abdomen is soft.      Tenderness: There is no abdominal tenderness.   Musculoskeletal:         General: Swelling present.      Right lower leg: Edema present.      Comments: RLE edema. Associated erythema. Small blisters along posterior aspect of calf. No palpable areas of fluctuance. Tender to posterior calf   Neurological:      General: No focal deficit present.      Mental Status: He is alert and oriented to person, place, and time.   Psychiatric:         Behavior: Behavior normal.         Thought Content: Thought content normal.       Significant Labs:  I have reviewed all pertinent lab results within the past 24 hours.  CBC:   Recent Labs   Lab 12/08/22  0344   WBC 13.20*   RBC 3.87*   HGB 11.9*   HCT 35.5*      MCV 92   MCH 30.7   MCHC 33.5     CMP:   Recent Labs   Lab 12/08/22  0344   GLU 89   CALCIUM 8.2*    ALBUMIN 1.9*   PROT 5.9*      K 3.9   CO2 22*      BUN 21*   CREATININE 0.9   ALKPHOS 75   ALT 41   AST 28   BILITOT 0.6       Significant Diagnostics:  I have reviewed all pertinent imaging results/findings within the past 24 hours.    Assessment/Plan:     * Cellulitis of right lower extremity  Jim Waddell is a 37 y.o. gentleman with RLE cellulitis    - on imaging, small fluid collection  - no drainable abscesses on physical exam  - would continue antibiotics  - may develop a drainable abscess later  - Will sign off, please call again if an abscess develops        Timbo Haskins MD  General Surgery  [facility] - Observation 11H

## 2022-12-08 NOTE — PROGRESS NOTES
[facility] Medicine  Daily progress note    Patient Name: Jim Waddell  MRN: [id number]  Patient Class: OP- Observation  Admission Date: 12/6/2022  Attending Physician: Eri Ruiz MD   Primary Care Provider: Primary Doctor No      Patient information was obtained from patient, past medical records and ER records.     Subjective:     Principal Problem:Cellulitis of right lower extremity    Chief Complaint:   Chief Complaint   Patient presents with    Leg Pain     Pt c/o left lower leg pain and swelling.  Reports redness behind knee and then blistering to calf. States he has been walking around a lot at [place] past few days.         HPI: This is a 38yo male with no reported past medical history who presents to the ED with chief complaint of RLE pain and swelling. Patient states that one week ago he was having URI symptoms and was treated with azithromycin and steroid injection at Urgent care. Later same week was still not feeling well and went to [facility] in [place] and states he got another steroid shot and also vitamin infusion. Reports feeling well the next couple days and went to [place]. On Friday patient had new onset fevers with high temp 102.8. Went to  in [place] on 12/3 and got levaquin and prednisone.  Next day patient had new onset lower calf pain and since that time pain has been progressively worsening with associated RLE swelling, erythema, warmth, tenderness. Denies numbness, tingling, nausea, vomiting, chest pain, shortness of breath, weakness, abdominal pain or confusion.     In the ED patient afebrile and hemodynamically stable saturating well on room air. WBC 26.48, LA 1.0. US RLE negative for DVT and Xray negative for acute fracture. Cellulitis skin changes present from low calf to mid medial thigh. Full ROM of all extremities and knee. ESR and CRP significantly elevated. Patient started on iv abx and admitted to the care of medicine for further evaluation and  "management.      Subjective:  NAEON. No chest pain, shortness of breath, lightheadedness.  Still with pain of right lower extremity-- feels pain control can be better.  "I feel like I have a bad avani horse."      NAD, AO3  NC, AT  RRR  CTAB  SNTND+BS  Right lower extremity with redness and erythema with dried scanlon crust.  Redness has receded from line of demarcation, but he continues to have significant induration in calf and posterior lower thigh. R leg is twice as big as L. Significant swelling still present.     Vitals, labs and radiographs from past 24h reviewed and personally interpreted.     Assessment/Plan:     * Cellulitis of right lower extremity  - Cellulitis of RLE ;  Patient SIRS 2/4 however WBC possibly elevated due to recent steroids. Sepsis felt less likely.  - ESR and CRP significantly elevated  - follow up blood cultures  - general surgery consulted regarding the possibility of a fluid collection.  No drainable abscess identified at this point.  That service will continue to follow   -on admission vancomycin and ceftriaxone were initiated.  As of 12/7, this was transitioned to ceftriaxone and clindamycin. ID consulted today.   - pain medications adjusted       Leukocytosis  - possibly secondary to recent steroid use. Also setting of cellulitis/infection also  - see management above    VTE Risk Mitigation (From admission, onward)           Ordered     enoxaparin injection 40 mg  Daily         12/06/22 1758     IP VTE HIGH RISK PATIENT  Once         12/06/22 1758     Place sequential compression device  Until discontinued         12/06/22 1758     Place sequential compression device  Until discontinued         12/06/22 1745                       Eri Ruiz MD  Department of Hospital Medicine   [facility] - Emergency Dept  "

## 2022-12-08 NOTE — ASSESSMENT & PLAN NOTE
Jim Waddell is a 37 y.o. gentleman with RLE cellulitis    - on imaging, small fluid collection  - no drainable abscesses on physical exam  - would continue antibiotics  - may develop a drainable abscess later  - Will sign off, please call again if an abscess develops

## 2022-12-09 LAB
ALBUMIN SERPL BCP-MCNC: 2.2 G/DL (ref 3.5–5.2)
ALP SERPL-CCNC: 70 U/L (ref 55–135)
ALT SERPL W/O P-5'-P-CCNC: 42 U/L (ref 10–44)
ANION GAP SERPL CALC-SCNC: 7 MMOL/L (ref 8–16)
ANISOCYTOSIS BLD QL SMEAR: SLIGHT
AST SERPL-CCNC: 30 U/L (ref 10–40)
BASOPHILS NFR BLD: 0 % (ref 0–1.9)
BILIRUB SERPL-MCNC: 1 MG/DL (ref 0.1–1)
BUN SERPL-MCNC: 16 MG/DL (ref 6–20)
CALCIUM SERPL-MCNC: 8.8 MG/DL (ref 8.7–10.5)
CHLORIDE SERPL-SCNC: 101 MMOL/L (ref 95–110)
CK SERPL-CCNC: 29 U/L (ref 20–200)
CO2 SERPL-SCNC: 27 MMOL/L (ref 23–29)
CREAT SERPL-MCNC: 1 MG/DL (ref 0.5–1.4)
CRP SERPL-MCNC: 115.1 MG/L (ref 0–8.2)
DIFFERENTIAL METHOD: ABNORMAL
EOSINOPHIL NFR BLD: 2 % (ref 0–8)
ERYTHROCYTE [DISTWIDTH] IN BLOOD BY AUTOMATED COUNT: 11.7 % (ref 11.5–14.5)
EST. GFR  (NO RACE VARIABLE): >60 ML/MIN/1.73 M^2
GLUCOSE SERPL-MCNC: 90 MG/DL (ref 70–110)
HCT VFR BLD AUTO: 39.9 % (ref 40–54)
HGB BLD-MCNC: 13.2 G/DL (ref 14–18)
IMM GRANULOCYTES # BLD AUTO: ABNORMAL K/UL (ref 0–0.04)
IMM GRANULOCYTES NFR BLD AUTO: ABNORMAL % (ref 0–0.5)
LYMPHOCYTES NFR BLD: 34 % (ref 18–48)
MCH RBC QN AUTO: 30.9 PG (ref 27–31)
MCHC RBC AUTO-ENTMCNC: 33.1 G/DL (ref 32–36)
MCV RBC AUTO: 93 FL (ref 82–98)
METAMYELOCYTES NFR BLD MANUAL: 2 %
MONOCYTES NFR BLD: 5 % (ref 4–15)
MYELOCYTES NFR BLD MANUAL: 2 %
NEUTROPHILS NFR BLD: 55 % (ref 38–73)
NRBC BLD-RTO: 0 /100 WBC
PLATELET # BLD AUTO: 371 K/UL (ref 150–450)
PLATELET BLD QL SMEAR: ABNORMAL
PMV BLD AUTO: 10.1 FL (ref 9.2–12.9)
POTASSIUM SERPL-SCNC: 4.3 MMOL/L (ref 3.5–5.1)
PROCALCITONIN SERPL IA-MCNC: 0.12 NG/ML
PROT SERPL-MCNC: 6.8 G/DL (ref 6–8.4)
RBC # BLD AUTO: 4.27 M/UL (ref 4.6–6.2)
SODIUM SERPL-SCNC: 135 MMOL/L (ref 136–145)
WBC # BLD AUTO: 14.66 K/UL (ref 3.9–12.7)

## 2022-12-09 PROCEDURE — 36415 COLL VENOUS BLD VENIPUNCTURE: CPT | Performed by: PHYSICIAN ASSISTANT

## 2022-12-09 PROCEDURE — 99233 PR SUBSEQUENT HOSPITAL CARE,LEVL III: ICD-10-PCS | Mod: ,,, | Performed by: FAMILY MEDICINE

## 2022-12-09 PROCEDURE — 86140 C-REACTIVE PROTEIN: CPT | Performed by: FAMILY MEDICINE

## 2022-12-09 PROCEDURE — 99233 SBSQ HOSP IP/OBS HIGH 50: CPT | Mod: ,,, | Performed by: FAMILY MEDICINE

## 2022-12-09 PROCEDURE — 11000001 HC ACUTE MED/SURG PRIVATE ROOM

## 2022-12-09 PROCEDURE — 25000003 PHARM REV CODE 250: Performed by: PHYSICIAN ASSISTANT

## 2022-12-09 PROCEDURE — 82550 ASSAY OF CK (CPK): CPT | Performed by: PHYSICIAN ASSISTANT

## 2022-12-09 PROCEDURE — 25000003 PHARM REV CODE 250: Performed by: INTERNAL MEDICINE

## 2022-12-09 PROCEDURE — 36415 COLL VENOUS BLD VENIPUNCTURE: CPT | Performed by: FAMILY MEDICINE

## 2022-12-09 PROCEDURE — 80053 COMPREHEN METABOLIC PANEL: CPT | Performed by: FAMILY MEDICINE

## 2022-12-09 PROCEDURE — 84145 PROCALCITONIN (PCT): CPT | Performed by: PHYSICIAN ASSISTANT

## 2022-12-09 PROCEDURE — 63600175 PHARM REV CODE 636 W HCPCS: Performed by: FAMILY MEDICINE

## 2022-12-09 PROCEDURE — 85007 BL SMEAR W/DIFF WBC COUNT: CPT | Performed by: FAMILY MEDICINE

## 2022-12-09 PROCEDURE — 63600175 PHARM REV CODE 636 W HCPCS: Performed by: PHYSICIAN ASSISTANT

## 2022-12-09 PROCEDURE — 25500020 PHARM REV CODE 255: Performed by: FAMILY MEDICINE

## 2022-12-09 PROCEDURE — 99223 PR INITIAL HOSPITAL CARE,LEVL III: ICD-10-PCS | Mod: ,,, | Performed by: INTERNAL MEDICINE

## 2022-12-09 PROCEDURE — 25000003 PHARM REV CODE 250: Performed by: FAMILY MEDICINE

## 2022-12-09 PROCEDURE — 99223 1ST HOSP IP/OBS HIGH 75: CPT | Mod: ,,, | Performed by: INTERNAL MEDICINE

## 2022-12-09 PROCEDURE — 85027 COMPLETE CBC AUTOMATED: CPT | Performed by: FAMILY MEDICINE

## 2022-12-09 RX ORDER — CLINDAMYCIN PHOSPHATE 900 MG/50ML
900 INJECTION, SOLUTION INTRAVENOUS
Status: DISCONTINUED | OUTPATIENT
Start: 2022-12-09 | End: 2022-12-11

## 2022-12-09 RX ADMIN — IOHEXOL 100 ML: 350 INJECTION, SOLUTION INTRAVENOUS at 06:12

## 2022-12-09 RX ADMIN — ENOXAPARIN SODIUM 40 MG: 40 INJECTION SUBCUTANEOUS at 05:12

## 2022-12-09 RX ADMIN — HYDROMORPHONE HYDROCHLORIDE 1 MG: 1 INJECTION, SOLUTION INTRAMUSCULAR; INTRAVENOUS; SUBCUTANEOUS at 11:12

## 2022-12-09 RX ADMIN — CEFEPIME 2 G: 2 INJECTION, POWDER, FOR SOLUTION INTRAVENOUS at 11:12

## 2022-12-09 RX ADMIN — HYDROMORPHONE HYDROCHLORIDE 1 MG: 1 INJECTION, SOLUTION INTRAMUSCULAR; INTRAVENOUS; SUBCUTANEOUS at 01:12

## 2022-12-09 RX ADMIN — TACROLIMUS 900 MG: 0.5 CAPSULE ORAL at 09:12

## 2022-12-09 RX ADMIN — CEFEPIME 2 G: 2 INJECTION, POWDER, FOR SOLUTION INTRAVENOUS at 03:12

## 2022-12-09 RX ADMIN — CLINDAMYCIN IN 5 PERCENT DEXTROSE 600 MG: 12 INJECTION, SOLUTION INTRAVENOUS at 09:12

## 2022-12-09 RX ADMIN — HYDROMORPHONE HYDROCHLORIDE 1 MG: 1 INJECTION, SOLUTION INTRAMUSCULAR; INTRAVENOUS; SUBCUTANEOUS at 08:12

## 2022-12-09 RX ADMIN — DAPTOMYCIN: 350 INJECTION, POWDER, LYOPHILIZED, FOR SOLUTION INTRAVENOUS at 03:12

## 2022-12-09 RX ADMIN — OXYCODONE AND ACETAMINOPHEN 1 TABLET: 325; 10 TABLET ORAL at 05:12

## 2022-12-09 RX ADMIN — HYDROMORPHONE HYDROCHLORIDE 1 MG: 1 INJECTION, SOLUTION INTRAMUSCULAR; INTRAVENOUS; SUBCUTANEOUS at 12:12

## 2022-12-09 NOTE — PLAN OF CARE
Abx and pain medication administered per order with relief. Pt encouraged to elevate right leg. Urinal and call light within reach.

## 2022-12-09 NOTE — PLAN OF CARE
No acute events during shift. PRN pain meds given as ordered. VSS. Fall and safety precautions maintained. POC discussed with patient. Will continue to monitor.     Problem: Adult Inpatient Plan of Care  Goal: Plan of Care Review  12/9/2022 0603 by Kristina Servin LPN  Outcome: Ongoing, Progressing  12/9/2022 0559 by Kristina Servin LPN  Outcome: Ongoing, Progressing  Goal: Patient-Specific Goal (Individualized)  12/9/2022 0603 by Kristina Servin LPN  Outcome: Ongoing, Progressing  12/9/2022 0559 by Kristina Servin LPN  Outcome: Ongoing, Progressing  Goal: Absence of Hospital-Acquired Illness or Injury  12/9/2022 0603 by Kristina Servin LPN  Outcome: Ongoing, Progressing  12/9/2022 0559 by Kristina Servin LPN  Outcome: Ongoing, Progressing  Goal: Optimal Comfort and Wellbeing  12/9/2022 0603 by Kristina Servin LPN  Outcome: Ongoing, Progressing  12/9/2022 0559 by Kristina Servin LPN  Outcome: Ongoing, Progressing  Goal: Readiness for Transition of Care  12/9/2022 0603 by Kristina Servin LPN  Outcome: Ongoing, Progressing  12/9/2022 0559 by Kristina Servin LPN  Outcome: Ongoing, Progressing     Problem: Impaired Wound Healing  Goal: Optimal Wound Healing  12/9/2022 0603 by Kristina Servin LPN  Outcome: Ongoing, Progressing  12/9/2022 0559 by Kristina Servin LPN  Outcome: Ongoing, Progressing

## 2022-12-09 NOTE — HPI
40 you male without significant PMHx pw RLE pain and swelling 12/6/22.  He had been tin [place] last week and developed RLE pain/swelling and was seen by UC there (last Fri) and given po prednisone and abx.  His leg pain worsened and he developed fever and chills all of which later improved though he continued with night sweats.  When he returned the leg worsened with pain redness and swelling.  He presented to the ED for eval 12/6.  He was admitted with cf cellulitis.    On admit WBC was 26 and .  He was treated with vanc and rocephin then clinda/rocephin WBC improved then trended up to 14s.      MRI RLE done cf: regions of intramuscular edema, most pronounced in adductor deysi, biceps femoris, and gastrocnemius.  Intermuscular edema as well, most pronounced in the posterior thigh compartment.  There are curvilinear rim enhancing fluid collections along the posterior aspects of semimembranosus and medial gastrocnemius.     Gen Sx consulted abd followed x 2d but rec abx, no surgical intervention.  ID consulted for cellulitis.  Patient seen and still co of significant leg pain - 3 /10 at rest but 30/10 with walking.  Leg still swollen but now with progressive bruising over last 12 h in calf.  Reports leg previously weeping/blistering.  Denies injury to leg but possible bug bit behind knee.  Denies knee pain with movement.  No water animal/ water exposure  Remains on and tolerating clindamycin and ceftriaxone. Blood cultures NGTD.

## 2022-12-09 NOTE — SUBJECTIVE & OBJECTIVE
History reviewed. No pertinent past medical history.    Past Surgical History:   Procedure Laterality Date    APPENDECTOMY      COLONOSCOPY N/A 3/25/2021    Procedure: COLONOSCOPY suprep;  Surgeon: Asad Powers MD;  [facility];  Service: Endoscopy;  Laterality: N/A;    INGUINAL HERNIA REPAIR  2018       Review of patient's allergies indicates:   Allergen Reactions    Pcn [penicillins]        Medications:  Facility-Administered Medications Prior to Admission   Medication    acetaminophen tablet 650 mg    albuterol inhaler 2 puff    diphenhydrAMINE injection 25 mg    EPINEPHrine (EPIPEN) 0.3 mg/0.3 mL pen injection 0.3 mg    methylPREDNISolone sodium succinate injection 40 mg    ondansetron disintegrating tablet 4 mg    sodium chloride 0.9% 500 mL flush bag    sodium chloride 0.9% flush 10 mL     Medications Prior to Admission   Medication Sig    acetaminophen (TYLENOL) 500 MG tablet Take 1,000 mg by mouth every 6 (six) hours as needed for Pain (fever).    albuterol (PROVENTIL/VENTOLIN HFA) 90 mcg/actuation inhaler Inhale 2 puffs into the lungs every 6 (six) hours as needed for Wheezing or Shortness of Breath. Rescue    aspirin (ECOTRIN) 81 MG EC tablet Take 81 mg by mouth once.    betamethasone valerate 0.1% (VALISONE) 0.1 % Lotn AAA of scalp daily-bid prn scaling/itching. (Patient taking differently: Apply to the affected area of scalp once to twice daily as needed for scaling/itching.)    ciclopirox 1 % shampoo LATHER SCALP FOR 5-10 MINUTES, THEN RINSE. USE ONCE TO TWICE WEEKLY    fluocinonide 0.05% (LIDEX) 0.05 % cream AAA bid (Patient taking differently: Apply topically 2 (two) times daily as needed.)    fluticasone propionate (FLONASE) 50 mcg/actuation nasal spray 1 spray by Each Nostril route 2 (two) times daily as needed for Allergies.    ibuprofen (ADVIL,MOTRIN) 200 MG tablet Take 400-600 mg by mouth every 6 (six) hours as needed for Pain (fever).    levoFLOXacin  (LEVAQUIN) 500 MG tablet Take 500 mg by mouth once daily. For 7 days    predniSONE (DELTASONE) 20 MG tablet Take 20 mg by mouth 2 (two) times daily. For 7 days    promethazine-dextromethorphan (PROMETHAZINE-DM) 6.25-15 mg/5 mL Syrp Take 5 mLs by mouth every 6 (six) hours as needed (cough).    pseudoephedrine (SUDAFED) 30 MG tablet Take 30 mg by mouth every 6 (six) hours as needed for Congestion.     Antibiotics (From admission, onward)      Start     Stop Route Frequency Ordered    12/09/22 1515  DAPTOmycin (CUBICIN) 815 mg in sodium chloride 0.9% 50 mL IVPB         -- IV Every 24 hours (non-standard times) 12/09/22 1407    12/07/22 2345  clindamycin in D5W 600 mg/50 mL IVPB 600 mg         -- IV Every 8 hours (non-standard times) 12/07/22 2242    12/06/22 1845  cefTRIAXone (ROCEPHIN) 1 g in dextrose 5 % in water (D5W) 5 % 50 mL IVPB (MB+)         -- IV Every 24 hours (non-standard times) 12/06/22 1746          Antifungals (From admission, onward)      None          Antivirals (From admission, onward)      None               There is no immunization history on file for this patient.    Family History       Problem Relation (Age of Onset)    No Known Problems Mother, Father          Social History     Socioeconomic History    Marital status:    Tobacco Use    Smoking status: Never    Smokeless tobacco: Never   Substance and Sexual Activity    Alcohol use: Yes     Comment: socially    Drug use: No    Sexual activity: Yes     Partners: Female     Social Determinants of Health     Social Connections: Unknown    Marital Status:      Review of Systems   Constitutional:  Negative for appetite change, chills, diaphoresis, fatigue, fever and unexpected weight change.   HENT:  Negative for congestion, ear pain, sore throat and tinnitus.    Eyes:  Negative for pain, redness and visual disturbance.   Respiratory:  Negative for cough, shortness of breath and wheezing.    Cardiovascular:  Negative for chest  pain, palpitations and leg swelling.   Gastrointestinal:  Negative for abdominal pain, constipation, diarrhea, rectal pain and vomiting.   Endocrine: Negative for cold intolerance and heat intolerance.   Genitourinary:  Negative for dysuria, flank pain, frequency, hematuria and urgency.   Musculoskeletal:  Positive for myalgias. Negative for arthralgias, back pain and neck pain.   Skin:  Positive for color change and rash.   Allergic/Immunologic: Negative for immunocompromised state.   Neurological:  Negative for dizziness, light-headedness, numbness and headaches.   Hematological:  Negative for adenopathy. Does not bruise/bleed easily.   Psychiatric/Behavioral:  Negative for confusion and sleep disturbance. The patient is not nervous/anxious.    Objective:     Vital Signs (Most Recent):  Temp: 97.7 °F (36.5 °C) (12/09/22 1139)  Pulse: 92 (12/09/22 1139)  Resp: 18 (12/09/22 1359)  BP: 134/82 (12/09/22 1139)  SpO2: (!) 92 % (12/09/22 1139)   Vital Signs (24h Range):  Temp:  [97.2 °F (36.2 °C)-100.6 °F (38.1 °C)] 97.7 °F (36.5 °C)  Pulse:  [90-99] 92  Resp:  [16-18] 18  SpO2:  [91 %-97 %] 92 %  BP: (113-139)/(64-82) 134/82     Weight: 102.1 kg (225 lb)  Body mass index is 33.23 kg/m².    Estimated Creatinine Clearance: 120.3 mL/min (based on SCr of 1 mg/dL).    Physical Exam  Constitutional:       General: He is not in acute distress.     Appearance: Normal appearance. He is well-developed and normal weight. He is not ill-appearing, toxic-appearing or diaphoretic.       HENT:      Head: Normocephalic and atraumatic.   Cardiovascular:      Rate and Rhythm: Normal rate and regular rhythm.      Heart sounds: Normal heart sounds. No murmur heard.    No friction rub. No gallop.   Pulmonary:      Effort: Pulmonary effort is normal. No respiratory distress.      Breath sounds: Normal breath sounds. No wheezing or rales.   Abdominal:      General: Bowel sounds are normal. There is no distension.      Palpations: Abdomen is  soft. There is no mass.      Tenderness: There is no abdominal tenderness. There is no guarding or rebound.   Skin:     General: Skin is warm and dry.   Neurological:      Mental Status: He is alert and oriented to person, place, and time.   Psychiatric:         Behavior: Behavior normal.   12/9/22 12/6        Significant Labs: CBC:   Recent Labs   Lab 12/08/22 0344 12/09/22 0428   WBC 13.20* 14.66*   HGB 11.9* 13.2*   HCT 35.5* 39.9*    371     CMP:   Recent Labs   Lab 12/08/22 0344 12/09/22 0428    135*   K 3.9 4.3    101   CO2 22* 27   GLU 89 90   BUN 21* 16   CREATININE 0.9 1.0   CALCIUM 8.2* 8.8   PROT 5.9* 6.8   ALBUMIN 1.9* 2.2*   BILITOT 0.6 1.0   ALKPHOS 75 70   AST 28 30   ALT 41 42   ANIONGAP 8 7*     Wound Culture: No results for input(s): LABAERO in the last 4320 hours.  All pertinent labs within the past 24 hours have been reviewed.    Significant Imaging: I have reviewed all pertinent imaging results/findings within the past 24 hours.  MRI Tibia Fibula W WO Contrast Right [[id number]] (Abnormal) Resulted: 12/07/22 1215   Order Status: Completed Updated: 12/07/22 1218   Narrative:     EXAMINATION:   MRI TIBIA FIBULA W WO CONTRAST RIGHT     CLINICAL HISTORY:   Soft tissue infection suspected, lower leg, xray done;     TECHNIQUE:   Multisequence multiplanar images of the right lower extremity centered about the knee were performed, before and after intravenous administration of 10 mL Gadavist.     COMPARISON:   Knee radiographs 12/06/2022     FINDINGS:   SOFT TISSUES: There is skin thickening and subcutaneous edema, most pronounced along the lateral aspect of the right lower extremity.  There are regions of intramuscular edema, most pronounced in adductor deysi, biceps femoris, and gastrocnemius.  No focal discontinuity of muscle fibers.  No focal muscle atrophy.  There is intermuscular edema as well, most pronounced in the posterior thigh compartment.  There are  curvilinear rim enhancing fluid collections along the posterior aspects of semimembranosus and medial gastrocnemius (14:15 and 15:17).     BONES: No fracture, osteonecrosis, or focal lesion.  No evidence of osteomyelitis.     JOINTS: Small knee joint effusion.  No synovitis.     TENDONS: Visualized tendons are intact.     LIGAMENTS: Examination not tailored for assessment of knee ligaments.     OTHER: No popliteal lymphadenopathy.    Impression:       Inflammation of the skin, subcutaneous fat, muscles, and fascia, which could be inflammatory or infectious.     Curvilinear rim enhancing fluid collections along the posterior aspects of semimembranosus and medial gastrocnemius, concerning for abscess.     No evidence of osteomyelitis.     Small knee joint effusion, likely physiologic or reactive.     This report was flagged in Epic as abnormal.       Electronically signed by: Francesco Hayes   Date: 12/07/2022   Time: 12:15   US Lower Extremity Veins Right [[id number]] Resulted: 12/06/22 1652   Order Status: Completed Updated: 12/06/22 1654   Narrative:     EXAMINATION:   US LOWER EXTREMITY VEINS RIGHT     CLINICAL HISTORY:   Other specified soft tissue disorders     TECHNIQUE:   Duplex and color flow Doppler evaluation and graded compression of the right lower extremity veins was performed.     COMPARISON:   None     FINDINGS:   Duplex and color flow Doppler evaluation does not reveal any evidence of acute venous thrombosis in the common femoral, superficial femoral, greater saphenous, popliteal, peroneal, anterior tibial and posterior tibial veins of the right lower extremity.  There is no reflux to suggest valvular incompetence.    Impression:       No evidence of deep venous thrombosis in the right lower extremity.       Electronically signed by: Nicola Esposito MD   Date: 12/06/2022   Time: 16:52   X-Ray Knee 3 View Right [[id number]] Resulted: 12/06/22 1505   Order Status: Completed Updated: 12/06/22 1507   Narrative:      EXAMINATION:   XR KNEE 3 VIEW RIGHT     CLINICAL HISTORY:   Pain in right knee     TECHNIQUE:   AP, lateral, and Merchant views of the right knee were performed.     COMPARISON:   None     FINDINGS:   Joint spaces maintained.  Bony structures are intact.  No joint effusion is seen.    Impression:       See above       Electronically signed by: Herbert Beltran MD   Date: 12/06/2022   Time: 15:05     Imaging History    2022    Date Procedure Name Study Review Link PACS Link Status Accession Number Location   12/07/22 11:50 AM MRI Tibia Fibula W WO Contrast Right Study Review  Images Final [id number] [facility]   12/06/22 04:38 PM US Lower Extremity Veins Right Study Review  Images Final [id number] [facility]   12/06/22 02:47 PM X-Ray Knee 3 View Right Study Review  Images Final [id number] [facility]

## 2022-12-09 NOTE — CONSULTS
[facility]  Infectious Disease  Consult Note    Patient Name: Jim Waddell  MRN: [id number]  Admission Date: 12/6/2022  Hospital Length of Stay: 1 days  Attending Physician: Eri Ruiz MD  Primary Care Provider: Primary Doctor No     Isolation Status: No active isolations    Patient information was obtained from patient, spouse/SO and ER records.      Consults  Assessment/Plan:     * Cellulitis of right lower extremity  38 yo male with no significant PMHX pw RLE pain and cellulitis now with cf deep infection progressed on clindamycin and rocephin with MRI showing abscess and possible fascial involvement.  Per dw radiology abscess measures gastroc is 9.5 x 6.6 x 0.5 cm,  semimembranosus is 6.5 x 4.0 x 1.2 cm and both very thin.  Gen sx followed but rec no surgical intervention.  There is concern for possible developing necrotizing infection, needs urgent re-eval by gen sx, and may require surgery.  WBC 14s currently and  on admit.  Non septic appearing.    Plan:  · rec urgent reconsult of gen sx  · If no intervention rec IR consult for consideration of aspiration and cultures  · Add daptomycin for MRSA coverage  · Baseline CPK ordered  · Increase dose of ceftriaxone to 2g IV q24h and clindamycin to 900 q8h (for toxin)  · US of LE Soft tissues ordered   · Judicious leg elevation - ankle>knee>hip  · Seen with ID staff  · Plan coordinated with primary team and gen sx for expedient evalaution    Leukocytosis  Improved but not resolved.  Will trend        Thank you for your consult. I will follow-up with patient. Please contact us if you have any additional questions.    MELQUIADES Mckeon  Infectious Disease  [facility]    Subjective:     Principal Problem: Cellulitis of right lower extremity    HPI: 40 you male without significant PMHx pw RLE pain and swelling 12/6/22.  He had been tin [place] last week and developed RLE pain/swelling and was seen by  there  (last Fri) and given po prednisone and abx.  His leg pain worsened and he developed fever and chills all of which later improved though he continued with night sweats.  When he returned the leg worsened with pain redness and swelling.  He presented to the ED for eval 12/6.  He was admitted with cf cellulitis.    On admit WBC was 26 and .  He was treated with vanc and rocephin then clinda/rocephin WBC improved then trended up to 14s.      MRI RLE done cf: regions of intramuscular edema, most pronounced in adductor deysi, biceps femoris, and gastrocnemius.  Intermuscular edema as well, most pronounced in the posterior thigh compartment.  There are curvilinear rim enhancing fluid collections along the posterior aspects of semimembranosus and medial gastrocnemius.     Gen Sx consulted abd followed x 2d but rec abx, no surgical intervention.  ID consulted for cellulitis.  Patient seen and still co of significant leg pain - 3 /10 at rest but 30/10 with walking.  Leg still swollen but now with progressive bruising over last 12 h in calf.  Reports leg previously weeping/blistering.  Denies injury to leg but possible bug bit behind knee.  Denies knee pain with movement.  No water animal/ water exposure  Remains on and tolerating clindamycin and ceftriaxone. Blood cultures NGTD.      History reviewed. No pertinent past medical history.    Past Surgical History:   Procedure Laterality Date    APPENDECTOMY      COLONOSCOPY N/A 3/25/2021    Procedure: COLONOSCOPY suprep;  Surgeon: Asad Powers MD;  Location: [facility];  Service: Endoscopy;  Laterality: N/A;    INGUINAL HERNIA REPAIR  2018       Review of patient's allergies indicates:   Allergen Reactions    Pcn [penicillins]        Medications:  Facility-Administered Medications Prior to Admission   Medication    acetaminophen tablet 650 mg    albuterol inhaler 2 puff    diphenhydrAMINE injection 25 mg    EPINEPHrine (EPIPEN) 0.3 mg/0.3 mL pen  injection 0.3 mg    methylPREDNISolone sodium succinate injection 40 mg    ondansetron disintegrating tablet 4 mg    sodium chloride 0.9% 500 mL flush bag    sodium chloride 0.9% flush 10 mL     Medications Prior to Admission   Medication Sig    acetaminophen (TYLENOL) 500 MG tablet Take 1,000 mg by mouth every 6 (six) hours as needed for Pain (fever).    albuterol (PROVENTIL/VENTOLIN HFA) 90 mcg/actuation inhaler Inhale 2 puffs into the lungs every 6 (six) hours as needed for Wheezing or Shortness of Breath. Rescue    aspirin (ECOTRIN) 81 MG EC tablet Take 81 mg by mouth once.    betamethasone valerate 0.1% (VALISONE) 0.1 % Lotn AAA of scalp daily-bid prn scaling/itching. (Patient taking differently: Apply to the affected area of scalp once to twice daily as needed for scaling/itching.)    ciclopirox 1 % shampoo LATHER SCALP FOR 5-10 MINUTES, THEN RINSE. USE ONCE TO TWICE WEEKLY    fluocinonide 0.05% (LIDEX) 0.05 % cream AAA bid (Patient taking differently: Apply topically 2 (two) times daily as needed.)    fluticasone propionate (FLONASE) 50 mcg/actuation nasal spray 1 spray by Each Nostril route 2 (two) times daily as needed for Allergies.    ibuprofen (ADVIL,MOTRIN) 200 MG tablet Take 400-600 mg by mouth every 6 (six) hours as needed for Pain (fever).    levoFLOXacin (LEVAQUIN) 500 MG tablet Take 500 mg by mouth once daily. For 7 days    predniSONE (DELTASONE) 20 MG tablet Take 20 mg by mouth 2 (two) times daily. For 7 days    promethazine-dextromethorphan (PROMETHAZINE-DM) 6.25-15 mg/5 mL Syrp Take 5 mLs by mouth every 6 (six) hours as needed (cough).    pseudoephedrine (SUDAFED) 30 MG tablet Take 30 mg by mouth every 6 (six) hours as needed for Congestion.     Antibiotics (From admission, onward)      Start     Stop Route Frequency Ordered    12/09/22 1515  DAPTOmycin (CUBICIN) 815 mg in sodium chloride 0.9% 50 mL IVPB         -- IV Every 24 hours (non-standard times) 12/09/22 1407    12/07/22  2345  clindamycin in D5W 600 mg/50 mL IVPB 600 mg         -- IV Every 8 hours (non-standard times) 12/07/22 2242    12/06/22 1845  cefTRIAXone (ROCEPHIN) 1 g in dextrose 5 % in water (D5W) 5 % 50 mL IVPB (MB+)         -- IV Every 24 hours (non-standard times) 12/06/22 1746          Antifungals (From admission, onward)      None          Antivirals (From admission, onward)      None               There is no immunization history on file for this patient.    Family History       Problem Relation (Age of Onset)    No Known Problems Mother, Father          Social History     Socioeconomic History    Marital status:    Tobacco Use    Smoking status: Never    Smokeless tobacco: Never   Substance and Sexual Activity    Alcohol use: Yes     Comment: socially    Drug use: No    Sexual activity: Yes     Partners: Female     Social Determinants of Health     Social Connections: Unknown    Marital Status:      Review of Systems   Constitutional:  Negative for appetite change, chills, diaphoresis, fatigue, fever and unexpected weight change.   HENT:  Negative for congestion, ear pain, sore throat and tinnitus.    Eyes:  Negative for pain, redness and visual disturbance.   Respiratory:  Negative for cough, shortness of breath and wheezing.    Cardiovascular:  Negative for chest pain, palpitations and leg swelling.   Gastrointestinal:  Negative for abdominal pain, constipation, diarrhea, rectal pain and vomiting.   Endocrine: Negative for cold intolerance and heat intolerance.   Genitourinary:  Negative for dysuria, flank pain, frequency, hematuria and urgency.   Musculoskeletal:  Positive for myalgias. Negative for arthralgias, back pain and neck pain.   Skin:  Positive for color change and rash.   Allergic/Immunologic: Negative for immunocompromised state.   Neurological:  Negative for dizziness, light-headedness, numbness and headaches.   Hematological:  Negative for adenopathy. Does not bruise/bleed  easily.   Psychiatric/Behavioral:  Negative for confusion and sleep disturbance. The patient is not nervous/anxious.    Objective:     Vital Signs (Most Recent):  Temp: 97.7 °F (36.5 °C) (12/09/22 1139)  Pulse: 92 (12/09/22 1139)  Resp: 18 (12/09/22 1359)  BP: 134/82 (12/09/22 1139)  SpO2: (!) 92 % (12/09/22 1139)   Vital Signs (24h Range):  Temp:  [97.2 °F (36.2 °C)-100.6 °F (38.1 °C)] 97.7 °F (36.5 °C)  Pulse:  [90-99] 92  Resp:  [16-18] 18  SpO2:  [91 %-97 %] 92 %  BP: (113-139)/(64-82) 134/82     Weight: 102.1 kg (225 lb)  Body mass index is 33.23 kg/m².    Estimated Creatinine Clearance: 120.3 mL/min (based on SCr of 1 mg/dL).    Physical Exam  Constitutional:       General: He is not in acute distress.     Appearance: Normal appearance. He is well-developed and normal weight. He is not ill-appearing, toxic-appearing or diaphoretic.       HENT:      Head: Normocephalic and atraumatic.   Cardiovascular:      Rate and Rhythm: Normal rate and regular rhythm.      Heart sounds: Normal heart sounds. No murmur heard.    No friction rub. No gallop.   Pulmonary:      Effort: Pulmonary effort is normal. No respiratory distress.      Breath sounds: Normal breath sounds. No wheezing or rales.   Abdominal:      General: Bowel sounds are normal. There is no distension.      Palpations: Abdomen is soft. There is no mass.      Tenderness: There is no abdominal tenderness. There is no guarding or rebound.   Skin:     General: Skin is warm and dry.   Neurological:      Mental Status: He is alert and oriented to person, place, and time.   Psychiatric:         Behavior: Behavior normal.   12/9/22            12/6        Significant Labs: CBC:   Recent Labs   Lab 12/08/22  0344 12/09/22  0428   WBC 13.20* 14.66*   HGB 11.9* 13.2*   HCT 35.5* 39.9*    371     CMP:   Recent Labs   Lab 12/08/22  0344 12/09/22  0428    135*   K 3.9 4.3    101   CO2 22* 27   GLU 89 90   BUN 21* 16   CREATININE 0.9 1.0   CALCIUM 8.2*  8.8   PROT 5.9* 6.8   ALBUMIN 1.9* 2.2*   BILITOT 0.6 1.0   ALKPHOS 75 70   AST 28 30   ALT 41 42   ANIONGAP 8 7*     Wound Culture: No results for input(s): LABAERO in the last 4320 hours.  All pertinent labs within the past 24 hours have been reviewed.    Significant Imaging: I have reviewed all pertinent imaging results/findings within the past 24 hours.  MRI Tibia Fibula W WO Contrast Right [[id number]] (Abnormal) Resulted: 12/07/22 1215   Order Status: Completed Updated: 12/07/22 1218   Narrative:     EXAMINATION:   MRI TIBIA FIBULA W WO CONTRAST RIGHT     CLINICAL HISTORY:   Soft tissue infection suspected, lower leg, xray done;     TECHNIQUE:   Multisequence multiplanar images of the right lower extremity centered about the knee were performed, before and after intravenous administration of 10 mL Gadavist.     COMPARISON:   Knee radiographs 12/06/2022     FINDINGS:   SOFT TISSUES: There is skin thickening and subcutaneous edema, most pronounced along the lateral aspect of the right lower extremity.  There are regions of intramuscular edema, most pronounced in adductor deysi, biceps femoris, and gastrocnemius.  No focal discontinuity of muscle fibers.  No focal muscle atrophy.  There is intermuscular edema as well, most pronounced in the posterior thigh compartment.  There are curvilinear rim enhancing fluid collections along the posterior aspects of semimembranosus and medial gastrocnemius (14:15 and 15:17).     BONES: No fracture, osteonecrosis, or focal lesion.  No evidence of osteomyelitis.     JOINTS: Small knee joint effusion.  No synovitis.     TENDONS: Visualized tendons are intact.     LIGAMENTS: Examination not tailored for assessment of knee ligaments.     OTHER: No popliteal lymphadenopathy.    Impression:       Inflammation of the skin, subcutaneous fat, muscles, and fascia, which could be inflammatory or infectious.     Curvilinear rim enhancing fluid collections along the posterior aspects of  semimembranosus and medial gastrocnemius, concerning for abscess.     No evidence of osteomyelitis.     Small knee joint effusion, likely physiologic or reactive.     This report was flagged in Epic as abnormal.       Electronically signed by: Francesco Hayes   Date: 12/07/2022   Time: 12:15   US Lower Extremity Veins Right [[id number]] Resulted: 12/06/22 1652   Order Status: Completed Updated: 12/06/22 1654   Narrative:     EXAMINATION:   US LOWER EXTREMITY VEINS RIGHT     CLINICAL HISTORY:   Other specified soft tissue disorders     TECHNIQUE:   Duplex and color flow Doppler evaluation and graded compression of the right lower extremity veins was performed.     COMPARISON:   None     FINDINGS:   Duplex and color flow Doppler evaluation does not reveal any evidence of acute venous thrombosis in the common femoral, superficial femoral, greater saphenous, popliteal, peroneal, anterior tibial and posterior tibial veins of the right lower extremity.  There is no reflux to suggest valvular incompetence.    Impression:       No evidence of deep venous thrombosis in the right lower extremity.       Electronically signed by: Nicola Esposito MD   Date: 12/06/2022   Time: 16:52   X-Ray Knee 3 View Right [[id number]] Resulted: 12/06/22 1505   Order Status: Completed Updated: 12/06/22 1507   Narrative:     EXAMINATION:   XR KNEE 3 VIEW RIGHT     CLINICAL HISTORY:   Pain in right knee     TECHNIQUE:   AP, lateral, and Merchant views of the right knee were performed.     COMPARISON:   None     FINDINGS:   Joint spaces maintained.  Bony structures are intact.  No joint effusion is seen.    Impression:       See above       Electronically signed by: Herbert Beltran MD   Date: 12/06/2022   Time: 15:05     Imaging History    2022    Date Procedure Name Study Review Link PACS Link Status Accession Number Location   12/07/22 11:50 AM MRI Tibia Fibula W WO Contrast Right Study Review  Images Final [id number]   12/06/22 04:38 PM US  Lower Extremity Veins Right Study Review  Images Final [id number] [facility]   12/06/22 02:47 PM X-Ray Knee 3 View Right Study Review  Images Final [id number] [facility]

## 2022-12-09 NOTE — CONSULTS
[facility]  Infectious Disease  Consult Note    Patient Name: Jim Waddell  MRN: [id number]  Admission Date: 12/6/2022  Hospital Length of Stay: 1 days  Attending Physician: Eri Ruiz MD  Primary Care Provider: Primary Doctor No     Inpatient consult to Infectious Diseases  Consult performed by: MELQUIADES Galarza Jr.  Consult ordered by: Eri Ruiz MD        Consult received.  Full consult to follow.    MELQUIADES Mckeon  Infectious Disease  [facility] [place] [facility]

## 2022-12-09 NOTE — PROGRESS NOTES
[facility] Medicine  Daily progress note    Patient Name: Jim Waddell  MRN: [id number]  Patient Class: IP- Inpatient  Admission Date: 12/6/2022  Attending Physician: Eri Ruiz MD   Primary Care Provider: Primary Doctor No      Patient information was obtained from patient, past medical records and ER records.     Subjective:     Principal Problem:Cellulitis of right lower extremity    Chief Complaint:   Chief Complaint   Patient presents with    Leg Pain     Pt c/o left lower leg pain and swelling.  Reports redness behind knee and then blistering to calf. States he has been walking around a lot at [place] past few days.         HPI: This is a 38yo male with no reported past medical history who presents to the ED with chief complaint of RLE pain and swelling. Patient states that one week ago he was having URI symptoms and was treated with azithromycin and steroid injection at Urgent care. Later same week was still not feeling well and [facility] [place] and states he got another steroid shot and also vitamin infusion. Reports feeling well the next couple days and went to [place]. On Friday patient had new onset fevers with high temp 102.8. Went to  in [place] on 12/3 and got levaquin and prednisone.  Next day patient had new onset lower calf pain and since that time pain has been progressively worsening with associated RLE swelling, erythema, warmth, tenderness. Denies numbness, tingling, nausea, vomiting, chest pain, shortness of breath, weakness, abdominal pain or confusion.     In the ED patient afebrile and hemodynamically stable saturating well on room air. WBC 26.48, LA 1.0. US RLE negative for DVT and Xray negative for acute fracture. Cellulitis skin changes present from low calf to mid medial thigh. Full ROM of all extremities and knee. ESR and CRP significantly elevated. Patient started on iv abx and admitted to the care of medicine for further evaluation and  management.      37-year-old male without significant past medical history presents with right lower extremity redness and tenderness consistent with cellulitis.  Imaging suggested the presence of a small fluid collection.  General surgery has been consulted and at this point does not feel that there is a drainable collection but will continue to follow.      On admission the patient was started on vancomycin and ceftriaxone, then transition to ceftriaxone and clindamycin. ID consulted as cellulitis remained severe.     Subjective:  NAEON. No chest pain, shortness of breath, lightheadedness.  Pain is much better controlled.     NAD, AO3  NC, AT  RRR  CTAB  SNTND+BS  Right lower extremity with less swelling and dull/ dark erythema now. Leg is still TTT.     Vitals, labs and radiographs from past 24h reviewed and personally interpreted.     Assessment/Plan:     * Cellulitis of right lower extremity  - general surgery was following and no surgical intervention warranted.   - ID consulted 12/8-- abx upgraded. Case re discussed with gen surg/ HM and ID.  - stat gen surg re eval-- CT w contrast ordered.   - pain control     Leukocytosis  - possibly secondary to recent steroid use. Also setting of cellulitis/infection also  - see management above    VTE Risk Mitigation (From admission, onward)           Ordered     enoxaparin injection 40 mg  Daily         12/06/22 1758     IP VTE HIGH RISK PATIENT  Once         12/06/22 1758     Place sequential compression device  Until discontinued         12/06/22 1758     Place sequential compression device  Until discontinued         12/06/22 1745                    Eri Ruiz MD  Department of Hospital Medicine   [facility] - Emergency Dept

## 2022-12-09 NOTE — ASSESSMENT & PLAN NOTE
36 yo male with no significant PMHX pw RLE pain and cellulitis now with cf deep infection progressed on clindamycin and rocephin with MRI showing abscess and possible fascial involvement.  Per dw radiology abscess measures gastroc is 9.5 x 6.6 x 0.5 cm,  semimembranosus is 6.5 x 4.0 x 1.2 cm and both very thin.  Gen sx followed but rec no surgical intervention.  There is concern for possible developing necrotizing infection, needs urgent re-eval by gen sx, and may require surgery.  WBC 14s currently and  on admit.  Non septic appearing.    Plan:  · rec urgent reconsult of gen sx  · If no intervention rec IR consult for consideration of aspiration and cultures  · Add daptomycin for MRSA coverage  · Baseline CPK ordered  · Increase dose of ceftriaxone to 2g IV q24h and clindamycin to 900 q8h (for toxin)  · US of LE Soft tissues ordered   · Judicious leg elevation - ankle>knee>hip  · Seen with ID staff  · Plan coordinated with primary team and gen sx for expedient evalaution

## 2022-12-10 LAB
ALBUMIN SERPL BCP-MCNC: 2.2 G/DL (ref 3.5–5.2)
ALP SERPL-CCNC: 73 U/L (ref 55–135)
ALT SERPL W/O P-5'-P-CCNC: 50 U/L (ref 10–44)
ANION GAP SERPL CALC-SCNC: 7 MMOL/L (ref 8–16)
ANISOCYTOSIS BLD QL SMEAR: SLIGHT
AST SERPL-CCNC: 37 U/L (ref 10–40)
BASOPHILS # BLD AUTO: ABNORMAL K/UL (ref 0–0.2)
BASOPHILS NFR BLD: 0 % (ref 0–1.9)
BILIRUB SERPL-MCNC: 0.6 MG/DL (ref 0.1–1)
BUN SERPL-MCNC: 14 MG/DL (ref 6–20)
CALCIUM SERPL-MCNC: 8.7 MG/DL (ref 8.7–10.5)
CHLORIDE SERPL-SCNC: 103 MMOL/L (ref 95–110)
CO2 SERPL-SCNC: 27 MMOL/L (ref 23–29)
CREAT SERPL-MCNC: 0.9 MG/DL (ref 0.5–1.4)
CRP SERPL-MCNC: 124.3 MG/L (ref 0–8.2)
DIFFERENTIAL METHOD: ABNORMAL
EOSINOPHIL # BLD AUTO: ABNORMAL K/UL (ref 0–0.5)
EOSINOPHIL NFR BLD: 3 % (ref 0–8)
ERYTHROCYTE [DISTWIDTH] IN BLOOD BY AUTOMATED COUNT: 11.7 % (ref 11.5–14.5)
ERYTHROCYTE [SEDIMENTATION RATE] IN BLOOD BY PHOTOMETRIC METHOD: 89 MM/HR (ref 0–23)
EST. GFR  (NO RACE VARIABLE): >60 ML/MIN/1.73 M^2
GLUCOSE SERPL-MCNC: 99 MG/DL (ref 70–110)
HCT VFR BLD AUTO: 37.1 % (ref 40–54)
HGB BLD-MCNC: 12.7 G/DL (ref 14–18)
HYPOCHROMIA BLD QL SMEAR: ABNORMAL
IMM GRANULOCYTES # BLD AUTO: ABNORMAL K/UL (ref 0–0.04)
IMM GRANULOCYTES NFR BLD AUTO: ABNORMAL % (ref 0–0.5)
LYMPHOCYTES # BLD AUTO: ABNORMAL K/UL (ref 1–4.8)
LYMPHOCYTES NFR BLD: 22 % (ref 18–48)
MCH RBC QN AUTO: 31.1 PG (ref 27–31)
MCHC RBC AUTO-ENTMCNC: 34.2 G/DL (ref 32–36)
MCV RBC AUTO: 91 FL (ref 82–98)
METAMYELOCYTES NFR BLD MANUAL: 1 %
MONOCYTES # BLD AUTO: ABNORMAL K/UL (ref 0.3–1)
MONOCYTES NFR BLD: 8 % (ref 4–15)
MYELOCYTES NFR BLD MANUAL: 2 %
NEUTROPHILS NFR BLD: 61 % (ref 38–73)
NEUTS BAND NFR BLD MANUAL: 1 %
NRBC BLD-RTO: 0 /100 WBC
PLATELET # BLD AUTO: 368 K/UL (ref 150–450)
PLATELET BLD QL SMEAR: ABNORMAL
PMV BLD AUTO: 9.9 FL (ref 9.2–12.9)
POTASSIUM SERPL-SCNC: 4.2 MMOL/L (ref 3.5–5.1)
PROCALCITONIN SERPL IA-MCNC: 0.1 NG/ML
PROT SERPL-MCNC: 7.1 G/DL (ref 6–8.4)
RBC # BLD AUTO: 4.08 M/UL (ref 4.6–6.2)
SODIUM SERPL-SCNC: 137 MMOL/L (ref 136–145)
WBC # BLD AUTO: 12.25 K/UL (ref 3.9–12.7)
WBC OTHER NFR BLD MANUAL: 2 %

## 2022-12-10 PROCEDURE — 80053 COMPREHEN METABOLIC PANEL: CPT | Performed by: PHYSICIAN ASSISTANT

## 2022-12-10 PROCEDURE — 25000003 PHARM REV CODE 250: Performed by: PHYSICIAN ASSISTANT

## 2022-12-10 PROCEDURE — 63600175 PHARM REV CODE 636 W HCPCS: Performed by: PHYSICIAN ASSISTANT

## 2022-12-10 PROCEDURE — 25000003 PHARM REV CODE 250: Performed by: FAMILY MEDICINE

## 2022-12-10 PROCEDURE — 99233 PR SUBSEQUENT HOSPITAL CARE,LEVL III: ICD-10-PCS | Mod: ,,, | Performed by: INTERNAL MEDICINE

## 2022-12-10 PROCEDURE — 36415 COLL VENOUS BLD VENIPUNCTURE: CPT | Performed by: PHYSICIAN ASSISTANT

## 2022-12-10 PROCEDURE — 99232 PR SUBSEQUENT HOSPITAL CARE,LEVL II: ICD-10-PCS | Mod: ,,, | Performed by: SURGERY

## 2022-12-10 PROCEDURE — 85060 PATHOLOGIST REVIEW: ICD-10-PCS | Mod: ,,, | Performed by: PATHOLOGY

## 2022-12-10 PROCEDURE — 85027 COMPLETE CBC AUTOMATED: CPT | Performed by: PHYSICIAN ASSISTANT

## 2022-12-10 PROCEDURE — 12000002 HC ACUTE/MED SURGE SEMI-PRIVATE ROOM

## 2022-12-10 PROCEDURE — 99233 SBSQ HOSP IP/OBS HIGH 50: CPT | Mod: ,,, | Performed by: INTERNAL MEDICINE

## 2022-12-10 PROCEDURE — 99232 SBSQ HOSP IP/OBS MODERATE 35: CPT | Mod: ,,, | Performed by: SURGERY

## 2022-12-10 PROCEDURE — 63600175 PHARM REV CODE 636 W HCPCS: Performed by: FAMILY MEDICINE

## 2022-12-10 PROCEDURE — 85007 BL SMEAR W/DIFF WBC COUNT: CPT | Performed by: PHYSICIAN ASSISTANT

## 2022-12-10 PROCEDURE — 86140 C-REACTIVE PROTEIN: CPT | Performed by: PHYSICIAN ASSISTANT

## 2022-12-10 PROCEDURE — 84145 PROCALCITONIN (PCT): CPT | Performed by: PHYSICIAN ASSISTANT

## 2022-12-10 PROCEDURE — 85060 BLOOD SMEAR INTERPRETATION: CPT | Mod: ,,, | Performed by: PATHOLOGY

## 2022-12-10 PROCEDURE — 85652 RBC SED RATE AUTOMATED: CPT | Performed by: PHYSICIAN ASSISTANT

## 2022-12-10 RX ADMIN — CEFEPIME 2 G: 2 INJECTION, POWDER, FOR SOLUTION INTRAVENOUS at 03:12

## 2022-12-10 RX ADMIN — OXYCODONE AND ACETAMINOPHEN 1 TABLET: 325; 10 TABLET ORAL at 05:12

## 2022-12-10 RX ADMIN — HYDROMORPHONE HYDROCHLORIDE 1 MG: 1 INJECTION, SOLUTION INTRAMUSCULAR; INTRAVENOUS; SUBCUTANEOUS at 09:12

## 2022-12-10 RX ADMIN — TACROLIMUS 900 MG: 0.5 CAPSULE ORAL at 08:12

## 2022-12-10 RX ADMIN — OXYCODONE AND ACETAMINOPHEN 1 TABLET: 325; 10 TABLET ORAL at 10:12

## 2022-12-10 RX ADMIN — TACROLIMUS 900 MG: 0.5 CAPSULE ORAL at 01:12

## 2022-12-10 RX ADMIN — TACROLIMUS 900 MG: 0.5 CAPSULE ORAL at 05:12

## 2022-12-10 RX ADMIN — CEFEPIME 2 G: 2 INJECTION, POWDER, FOR SOLUTION INTRAVENOUS at 09:12

## 2022-12-10 RX ADMIN — ENOXAPARIN SODIUM 40 MG: 40 INJECTION SUBCUTANEOUS at 05:12

## 2022-12-10 RX ADMIN — HYDROMORPHONE HYDROCHLORIDE 1 MG: 1 INJECTION, SOLUTION INTRAMUSCULAR; INTRAVENOUS; SUBCUTANEOUS at 05:12

## 2022-12-10 RX ADMIN — DAPTOMYCIN 815 MG: 350 INJECTION, POWDER, LYOPHILIZED, FOR SOLUTION INTRAVENOUS at 03:12

## 2022-12-10 NOTE — PROGRESS NOTES
[facility] ([place] [address])  Infectious Disease  Progress Note    Patient Name: Jim Waddell  MRN: [id number]  Admission Date: 12/6/2022  Length of Stay: 2 days  Attending Physician: Robbie Majano MD  Primary Care Provider: Primary Doctor No    Isolation Status: No active isolations  Assessment/Plan:      * Cellulitis of right lower extremity  36 yo male with no significant PMHX pw RLE pain and cellulitis now with cf deep infection progressed on clindamycin and rocephin with MRI showing abscess and possible fascial involvement.  Per dw radiology abscess measures gastroc is 9.5 x 6.6 x 0.5 cm,  semimembranosus is 6.5 x 4.0 x 1.2 cm and both very thin.  Gen sx followed but rec no surgical intervention.  There was concern for possible developing necrotizing infection, got urgent re-eval by gen sx who obtained LLE CT which was stable.   on admit.  Changed to Daptomycin, cefepime and clindamycin.      R leg improve mildly today- cellulitis improved. US without cf nec fasc.  Gen sx following. Encouraging as CRP improved and CPK WNL. Non septic appearing.    Plan:  Continue Dapto, clinda and cefepime  Possible reimage on Monday to reassess fluid collection and if could be drained  Needs daily labs - ordered  Seen by ID staff  US of LE Soft tissues ordered   Judicious leg elevation - ankle>knee>hip  Will follow.    Leukocytosis  Improved but not resolved.  Will trend - no labs yet today - have been ordered        Anticipated Disposition: tbd    Thank you for your consult. I will follow-up with patient. Please contact us if you have any additional questions.    MELQUIADES Mckeon  Infectious Disease  [facility] ([place] [address])    Subjective:     Principal Problem:Cellulitis of right lower extremity    HPI: 40 you male without significant PMHx pw RLE pain and swelling 12/6/22.  He had been tin [facility] last week and developed RLE pain/swelling and was seen by UC there (last  Fri) and given po prednisone and abx.  His leg pain worsened and he developed fever and chills all of which later improved though he continued with night sweats.  When he returned the leg worsened with pain redness and swelling.  He presented to the ED for eval 12/6.  He was admitted with cf cellulitis.    On admit WBC was 26 and .  He was treated with vanc and rocephin then clinda/rocephin WBC improved then trended up to 14s.      MRI RLE done cf: regions of intramuscular edema, most pronounced in adductor deysi, biceps femoris, and gastrocnemius.  Intermuscular edema as well, most pronounced in the posterior thigh compartment.  There are curvilinear rim enhancing fluid collections along the posterior aspects of semimembranosus and medial gastrocnemius.     Gen Sx consulted abd followed x 2d but rec abx, no surgical intervention.  ID consulted for cellulitis.  Patient seen and still co of significant leg pain - 3 /10 at rest but 30/10 with walking.  Leg still swollen but now with progressive bruising over last 12 h in calf.  Reports leg previously weeping/blistering.  Denies injury to leg but possible bug bit behind knee.  Denies knee pain with movement.  No water animal/ water exposure  Remains on and tolerating clindamycin and ceftriaxone. Blood cultures NGTD.    Interval History:   No AEON.   Afebrile and WBC WNL.  Leg less red.  Pain same.  The patient denies any recent fever, chills, or sweats.      Review of Systems   Constitutional:  Negative for chills, diaphoresis and fever.   Respiratory:  Negative for shortness of breath.    Cardiovascular:  Negative for chest pain.   Gastrointestinal:  Negative for abdominal pain, diarrhea, nausea and vomiting.   Genitourinary:  Negative for dysuria and hematuria.   Musculoskeletal:  Positive for myalgias.   Skin:  Positive for color change.   Objective:     Vital Signs (Most Recent):  Temp: 99.6 °F (37.6 °C) (12/10/22 0503)  Pulse: 94 (12/10/22 0503)  Resp: 18  (12/10/22 0503)  BP: 134/67 (12/10/22 0503)  SpO2: (!) 94 % (12/10/22 0503)   Vital Signs (24h Range):  Temp:  [97.2 °F (36.2 °C)-99.6 °F (37.6 °C)] 99.6 °F (37.6 °C)  Pulse:  [] 94  Resp:  [18] 18  SpO2:  [91 %-94 %] 94 %  BP: (112-134)/(59-82) 134/67     Weight: 102.1 kg (225 lb)  Body mass index is 33.23 kg/m².    Estimated Creatinine Clearance: 120.3 mL/min (based on SCr of 1 mg/dL).    Physical Exam  Constitutional:       General: He is not in acute distress.     Appearance: Normal appearance. He is well-developed and normal weight. He is not ill-appearing, toxic-appearing or diaphoretic.       HENT:      Head: Normocephalic and atraumatic.   Cardiovascular:      Rate and Rhythm: Normal rate and regular rhythm.      Heart sounds: Normal heart sounds. No murmur heard.    No friction rub. No gallop.   Pulmonary:      Effort: Pulmonary effort is normal. No respiratory distress.      Breath sounds: Normal breath sounds. No wheezing or rales.   Abdominal:      General: Bowel sounds are normal. There is no distension.      Palpations: Abdomen is soft. There is no mass.      Tenderness: There is no abdominal tenderness. There is no guarding or rebound.   Skin:     General: Skin is warm and dry.   Neurological:      Mental Status: He is alert and oriented to person, place, and time.   Psychiatric:         Behavior: Behavior normal.   12/10 below                    12/9 below                  Significant Labs: Blood Culture:   Recent Labs   Lab 12/06/22  1651 12/06/22  1731   LABBLOO No Growth to date  No Growth to date  No Growth to date  No Growth to date No Growth to date  No Growth to date  No Growth to date  No Growth to date     CBC:   Recent Labs   Lab 12/09/22 0428   WBC 14.66*   HGB 13.2*   HCT 39.9*        CMP:   Recent Labs   Lab 12/09/22 0428   *   K 4.3      CO2 27   GLU 90   BUN 16   CREATININE 1.0   CALCIUM 8.8   PROT 6.8   ALBUMIN 2.2*   BILITOT 1.0   ALKPHOS 70   AST 30    ALT 42   ANIONGAP 7*     Wound Culture: No results for input(s): LABAERO in the last 4320 hours.  All pertinent labs within the past 24 hours have been reviewed.    Significant Imaging: I have reviewed all pertinent imaging results/findings within the past 24 hours.

## 2022-12-10 NOTE — PROGRESS NOTES
[facility] Medicine  Daily progress note    Patient Name: Jim Waddell  MRN: [id number]  Patient Class: IP- Inpatient  Admission Date: 12/6/2022  Attending Physician: Robbie Majano MD   Primary Care Provider: Primary Doctor No      Patient information was obtained from patient, past medical records and ER records.     Subjective:     Principal Problem:Cellulitis of right lower extremity    Chief Complaint:   Chief Complaint   Patient presents with    Leg Pain     Pt c/o left lower leg pain and swelling.  Reports redness behind knee and then blistering to calf. States he has been walking around a lot at [place] past few days.         HPI: This is a 38yo male with no reported past medical history who presents to the ED with chief complaint of RLE pain and swelling. Patient states that one week ago he was having URI symptoms and was treated with azithromycin and steroid injection at Urgent care. Later same week was still not feeling well and went to [facility] in [place] and states he got another steroid shot and also vitamin infusion. Reports feeling well the next couple days and went to [place]. On Friday patient had new onset fevers with high temp 102.8. Went to  in [place] on 12/3 and got levaquin and prednisone.  Next day patient had new onset lower calf pain and since that time pain has been progressively worsening with associated RLE swelling, erythema, warmth, tenderness. Denies numbness, tingling, nausea, vomiting, chest pain, shortness of breath, weakness, abdominal pain or confusion.     In the ED patient afebrile and hemodynamically stable saturating well on room air. WBC 26.48, LA 1.0. US RLE negative for DVT and Xray negative for acute fracture. Cellulitis skin changes present from low calf to mid medial thigh. Full ROM of all extremities and knee. ESR and CRP significantly elevated. Patient started on iv abx and admitted to the care of medicine for further evaluation and  management.      37-year-old male without significant past medical history presents with right lower extremity redness and tenderness consistent with cellulitis.  Imaging suggested the presence of a small fluid collection.  General surgery has been consulted and at this point does not feel that there is a drainable collection but will continue to follow.      On admission the patient was started on vancomycin and ceftriaxone, then transition to ceftriaxone and clindamycin. ID consulted as cellulitis remained severe.     CT 12/9 no evidence of subcu emphysema     Subjective:  NAEON. No chest pain, shortness of breath, lightheadedness.  Pain is much better controlled.     NAD, AO3  NC, AT  RRR  CTAB  SNTND+BS  Right lower extremity with less swelling and dull/ dark erythema now. Leg is still TTT.     Vitals, labs and radiographs from past 24h reviewed and personally interpreted.     Assessment/Plan:     * Cellulitis of right lower extremity  - general surgery was following and no surgical intervention warranted.   - ID consulted 12/8-- abx upgraded. Case re discussed with gen surg/ HM and ID.currently on ( clinda, cefepime and dapto)  - gen surg re eval-- CT : no evidence of subcu emphysema    - pain control     Leukocytosis  - possibly secondary to recent steroid use. Also setting of cellulitis/infection also  - see management above    VTE Risk Mitigation (From admission, onward)           Ordered     enoxaparin injection 40 mg  Daily         12/06/22 1758     IP VTE HIGH RISK PATIENT  Once         12/06/22 1758     Place sequential compression device  Until discontinued         12/06/22 1758     Place sequential compression device  Until discontinued         12/06/22 1745                    Robbie Majano MD  Department of Hospital Medicine   [facility]

## 2022-12-10 NOTE — PLAN OF CARE
Problem: Adult Inpatient Plan of Care  Goal: Plan of Care Review  12/9/2022 1848 by Ursula Galaviz RN  Outcome: Ongoing, Progressing  12/9/2022 1114 by Ursula Galaviz RN  Outcome: Ongoing, Progressing  Goal: Patient-Specific Goal (Individualized)  Outcome: Ongoing, Progressing  Goal: Absence of Hospital-Acquired Illness or Injury  Outcome: Ongoing, Progressing  Goal: Optimal Comfort and Wellbeing  Outcome: Ongoing, Progressing

## 2022-12-10 NOTE — SUBJECTIVE & OBJECTIVE
Interval History: AF, HDS. Erythema improving. CT yesterday without evidence of subcutaneous emphysema. Pain simlar to yesterday, no new blistering.     Medications:  Continuous Infusions:  Scheduled Meds:   ceFEPime (MAXIPIME) IVPB EXTENDED INFUSION  2 g Intravenous Q8H    clindamycin (CLEOCIN) IVPB  900 mg Intravenous Q8H    DAPTOmycin (CUBICIN) IV (PEDS and ADULTS)  8 mg/kg Intravenous Q24H    enoxaparin  40 mg Subcutaneous Daily     PRN Meds:acetaminophen, albuterol-ipratropium, aluminum-magnesium hydroxide-simethicone, dextrose 10%, dextrose 10%, glucagon (human recombinant), glucose, glucose, HYDROmorphone, melatonin, naloxone, ondansetron, oxyCODONE, oxyCODONE-acetaminophen, senna-docusate 8.6-50 mg, sodium chloride 0.9%, sodium chloride 0.9%     Review of patient's allergies indicates:   Allergen Reactions    Pcn [penicillins]      Objective:     Vital Signs (Most Recent):  Temp: 99.6 °F (37.6 °C) (12/10/22 0503)  Pulse: 94 (12/10/22 0503)  Resp: 18 (12/10/22 0503)  BP: 134/67 (12/10/22 0503)  SpO2: (!) 94 % (12/10/22 0503) Vital Signs (24h Range):  Temp:  [97.2 °F (36.2 °C)-99.6 °F (37.6 °C)] 99.6 °F (37.6 °C)  Pulse:  [] 94  Resp:  [18] 18  SpO2:  [91 %-94 %] 94 %  BP: (112-134)/(59-82) 134/67     Weight: 102.1 kg (225 lb)  Body mass index is 33.23 kg/m².    Intake/Output - Last 3 Shifts         12/08 0700  12/09 0659 12/09 0700  12/10 0659 12/10 0700  12/11 0659    IV Piggyback       Total Intake(mL/kg)       Urine (mL/kg/hr) 3050 (1.2)      Total Output 3050      Net -3050             Urine Occurrence 1 x 1 x             Physical Exam  Constitutional:       General: He is not in acute distress.     Appearance: Normal appearance.   HENT:      Head: Normocephalic and atraumatic.   Cardiovascular:      Rate and Rhythm: Normal rate and regular rhythm.   Pulmonary:      Effort: Pulmonary effort is normal. No respiratory distress.      Breath sounds: Normal breath sounds.   Abdominal:      General:  Abdomen is flat. There is no distension.      Palpations: Abdomen is soft.      Tenderness: There is no abdominal tenderness.   Musculoskeletal:         General: Swelling present.      Right lower leg: Edema present.      Comments: RLE edema. Associated erythema. Small blisters along posterior aspect of calf. No palpable areas of fluctuance. Tender to posterior calf   Neurological:      General: No focal deficit present.      Mental Status: He is alert and oriented to person, place, and time.   Psychiatric:         Behavior: Behavior normal.         Thought Content: Thought content normal.       Significant Labs:  I have reviewed all pertinent lab results within the past 24 hours.  CBC:   Recent Labs   Lab 12/09/22 0428   WBC 14.66*   RBC 4.27*   HGB 13.2*   HCT 39.9*      MCV 93   MCH 30.9   MCHC 33.1       CMP:   Recent Labs   Lab 12/09/22 0428   GLU 90   CALCIUM 8.8   ALBUMIN 2.2*   PROT 6.8   *   K 4.3   CO2 27      BUN 16   CREATININE 1.0   ALKPHOS 70   ALT 42   AST 30   BILITOT 1.0         Significant Diagnostics:  I have reviewed all pertinent imaging results/findings within the past 24 hours.

## 2022-12-10 NOTE — SUBJECTIVE & OBJECTIVE
Interval History:   No AEON.   Afebrile and WBC WNL.  Leg less red.  Pain same.  The patient denies any recent fever, chills, or sweats.      Review of Systems   Constitutional:  Negative for chills, diaphoresis and fever.   Respiratory:  Negative for shortness of breath.    Cardiovascular:  Negative for chest pain.   Gastrointestinal:  Negative for abdominal pain, diarrhea, nausea and vomiting.   Genitourinary:  Negative for dysuria and hematuria.   Musculoskeletal:  Positive for myalgias.   Skin:  Positive for color change.   Objective:     Vital Signs (Most Recent):  Temp: 99.6 °F (37.6 °C) (12/10/22 0503)  Pulse: 94 (12/10/22 0503)  Resp: 18 (12/10/22 0503)  BP: 134/67 (12/10/22 0503)  SpO2: (!) 94 % (12/10/22 0503)   Vital Signs (24h Range):  Temp:  [97.2 °F (36.2 °C)-99.6 °F (37.6 °C)] 99.6 °F (37.6 °C)  Pulse:  [] 94  Resp:  [18] 18  SpO2:  [91 %-94 %] 94 %  BP: (112-134)/(59-82) 134/67     Weight: 102.1 kg (225 lb)  Body mass index is 33.23 kg/m².    Estimated Creatinine Clearance: 120.3 mL/min (based on SCr of 1 mg/dL).    Physical Exam  Constitutional:       General: He is not in acute distress.     Appearance: Normal appearance. He is well-developed and normal weight. He is not ill-appearing, toxic-appearing or diaphoretic.       HENT:      Head: Normocephalic and atraumatic.   Cardiovascular:      Rate and Rhythm: Normal rate and regular rhythm.      Heart sounds: Normal heart sounds. No murmur heard.    No friction rub. No gallop.   Pulmonary:      Effort: Pulmonary effort is normal. No respiratory distress.      Breath sounds: Normal breath sounds. No wheezing or rales.   Abdominal:      General: Bowel sounds are normal. There is no distension.      Palpations: Abdomen is soft. There is no mass.      Tenderness: There is no abdominal tenderness. There is no guarding or rebound.   Skin:     General: Skin is warm and dry.   Neurological:      Mental Status: He is alert and oriented to person,  place, and time.   Psychiatric:         Behavior: Behavior normal.   12/10 below                    12/9 below                  Significant Labs: Blood Culture:   Recent Labs   Lab 12/06/22  1651 12/06/22  1731   LABBLOO No Growth to date  No Growth to date  No Growth to date  No Growth to date No Growth to date  No Growth to date  No Growth to date  No Growth to date     CBC:   Recent Labs   Lab 12/09/22 0428   WBC 14.66*   HGB 13.2*   HCT 39.9*        CMP:   Recent Labs   Lab 12/09/22 0428   *   K 4.3      CO2 27   GLU 90   BUN 16   CREATININE 1.0   CALCIUM 8.8   PROT 6.8   ALBUMIN 2.2*   BILITOT 1.0   ALKPHOS 70   AST 30   ALT 42   ANIONGAP 7*     Wound Culture: No results for input(s): LABAERO in the last 4320 hours.  All pertinent labs within the past 24 hours have been reviewed.    Significant Imaging: I have reviewed all pertinent imaging results/findings within the past 24 hours.

## 2022-12-10 NOTE — ASSESSMENT & PLAN NOTE
36 yo male with no significant PMHX pw RLE pain and cellulitis now with cf deep infection progressed on clindamycin and rocephin with MRI showing abscess and possible fascial involvement.  Per dw radiology abscess measures gastroc is 9.5 x 6.6 x 0.5 cm,  semimembranosus is 6.5 x 4.0 x 1.2 cm and both very thin.  Gen sx followed but rec no surgical intervention.  There was concern for possible developing necrotizing infection, got urgent re-eval by gen chawlax who obtained LLE CT which was stable.   on admit.  Changed to Daptomycin, cefepime and clindamycin.      L leg improve mildly today- cellulitis improved. US without cf nec fasc.  Gen sx following. Encouraging as CRP improved and CPK WNL. Non septic appearing.    Plan:  · Continue Dapto, clinda and cefepime  · Possible reimage on Monday to reassess fluid collection and if could be drained  · Needs daily labs - ordered  · Seen by ID staff  · US of LE Soft tissues ordered   · Judicious leg elevation - ankle>knee>hip  · Will follow.

## 2022-12-10 NOTE — ASSESSMENT & PLAN NOTE
Jim Gregg Bairon is a 37 y.o. gentleman with RLE cellulitis    - low concern for NSTI  - on imaging, small fluid collection   - no drainable abscesses on physical exam  - would continue antibiotics  - may develop a drainable abscess later

## 2022-12-10 NOTE — PROGRESS NOTES
[facility] ([facility]  General Surgery  Progress Note    Subjective:     History of Present Illness:  Jim Waddell is a 37 y.o. gentleman with no significant PMH who presents with pain and swelling to his RLE for the past 5 days. He was at [place] walking around and noticed an erythematous rash that started on the back of his calf. It slowly worsened, and he presented to [facility] on 12/6. Reports a fever up to 102 and worsening swelling that improved with compression stockings. Prior to this episode, he was undergoing treatment for a presumed URI and received steroids and received azithromycin and levaquin as an outpatient. He denies numbness, weakness to his RLE. Labs significant for WBC 16. US without DVT in the RLE. MRI performed with curvilinear rim enhancing fluid collections along the posterior aspects of semimembranosus and medial gastrocnemius, concerning for abscess. He was started on IV antibiotics. General surgery consulted for evaluation of possible drainable abscess    Upon evaluation in his room he is AF and HDS. Reports that his erythema has improved, but has had worsening edema. Otherwise, No palpable areas of fluctuance, though he does have impressive edema. Palpable pulses bilaterally              Post-Op Info:  * No surgery found *         Interval History: AF, HDS. Erythema improving. CT yesterday without evidence of subcutaneous emphysema. Pain simlar to yesterday, no new blistering.     Medications:  Continuous Infusions:  Scheduled Meds:   ceFEPime (MAXIPIME) IVPB EXTENDED INFUSION  2 g Intravenous Q8H    clindamycin (CLEOCIN) IVPB  900 mg Intravenous Q8H    DAPTOmycin (CUBICIN) IV (PEDS and ADULTS)  8 mg/kg Intravenous Q24H    enoxaparin  40 mg Subcutaneous Daily     PRN Meds:acetaminophen, albuterol-ipratropium, aluminum-magnesium hydroxide-simethicone, dextrose 10%, dextrose 10%, glucagon (human recombinant), glucose, glucose, HYDROmorphone, melatonin, naloxone,  ondansetron, oxyCODONE, oxyCODONE-acetaminophen, senna-docusate 8.6-50 mg, sodium chloride 0.9%, sodium chloride 0.9%     Review of patient's allergies indicates:   Allergen Reactions    Pcn [penicillins]      Objective:     Vital Signs (Most Recent):  Temp: 99.6 °F (37.6 °C) (12/10/22 0503)  Pulse: 94 (12/10/22 0503)  Resp: 18 (12/10/22 0503)  BP: 134/67 (12/10/22 0503)  SpO2: (!) 94 % (12/10/22 0503) Vital Signs (24h Range):  Temp:  [97.2 °F (36.2 °C)-99.6 °F (37.6 °C)] 99.6 °F (37.6 °C)  Pulse:  [] 94  Resp:  [18] 18  SpO2:  [91 %-94 %] 94 %  BP: (112-134)/(59-82) 134/67     Weight: 102.1 kg (225 lb)  Body mass index is 33.23 kg/m².    Intake/Output - Last 3 Shifts         12/08 0700  12/09 0659 12/09 0700  12/10 0659 12/10 0700 12/11 0659    IV Piggyback       Total Intake(mL/kg)       Urine (mL/kg/hr) 3050 (1.2)      Total Output 3050      Net -3050             Urine Occurrence 1 x 1 x             Physical Exam  Constitutional:       General: He is not in acute distress.     Appearance: Normal appearance.   HENT:      Head: Normocephalic and atraumatic.   Cardiovascular:      Rate and Rhythm: Normal rate and regular rhythm.   Pulmonary:      Effort: Pulmonary effort is normal. No respiratory distress.      Breath sounds: Normal breath sounds.   Abdominal:      General: Abdomen is flat. There is no distension.      Palpations: Abdomen is soft.      Tenderness: There is no abdominal tenderness.   Musculoskeletal:         General: Swelling present.      Right lower leg: Edema present.      Comments: RLE edema. Associated erythema. Small blisters along posterior aspect of calf. No palpable areas of fluctuance. Tender to posterior calf   Neurological:      General: No focal deficit present.      Mental Status: He is alert and oriented to person, place, and time.   Psychiatric:         Behavior: Behavior normal.         Thought Content: Thought content normal.       Significant Labs:  I have reviewed all  pertinent lab results within the past 24 hours.  CBC:   Recent Labs   Lab 12/09/22 0428   WBC 14.66*   RBC 4.27*   HGB 13.2*   HCT 39.9*      MCV 93   MCH 30.9   MCHC 33.1       CMP:   Recent Labs   Lab 12/09/22  0428   GLU 90   CALCIUM 8.8   ALBUMIN 2.2*   PROT 6.8   *   K 4.3   CO2 27      BUN 16   CREATININE 1.0   ALKPHOS 70   ALT 42   AST 30   BILITOT 1.0         Significant Diagnostics:  I have reviewed all pertinent imaging results/findings within the past 24 hours.    Assessment/Plan:     * Cellulitis of right lower extremity  Jim Waddell is a 37 y.o. gentleman with RLE cellulitis    - low concern for NSTI  - on imaging, small fluid collection   - no drainable abscesses on physical exam  - would continue antibiotics  - may develop a drainable abscess later        Timbo Haskins MD  General Surgery  [facility] - Intensive Care ([place]-[facility])

## 2022-12-11 LAB
BACTERIA BLD CULT: NORMAL
BACTERIA BLD CULT: NORMAL

## 2022-12-11 PROCEDURE — 25000003 PHARM REV CODE 250: Performed by: PHYSICIAN ASSISTANT

## 2022-12-11 PROCEDURE — 99233 SBSQ HOSP IP/OBS HIGH 50: CPT | Mod: ,,, | Performed by: INTERNAL MEDICINE

## 2022-12-11 PROCEDURE — 63600175 PHARM REV CODE 636 W HCPCS: Performed by: FAMILY MEDICINE

## 2022-12-11 PROCEDURE — 25000003 PHARM REV CODE 250: Performed by: FAMILY MEDICINE

## 2022-12-11 PROCEDURE — 12000002 HC ACUTE/MED SURGE SEMI-PRIVATE ROOM

## 2022-12-11 PROCEDURE — 99233 SBSQ HOSP IP/OBS HIGH 50: CPT | Mod: ,,, | Performed by: PHYSICIAN ASSISTANT

## 2022-12-11 PROCEDURE — 99233 PR SUBSEQUENT HOSPITAL CARE,LEVL III: ICD-10-PCS | Mod: ,,, | Performed by: INTERNAL MEDICINE

## 2022-12-11 PROCEDURE — 99233 PR SUBSEQUENT HOSPITAL CARE,LEVL III: ICD-10-PCS | Mod: ,,, | Performed by: PHYSICIAN ASSISTANT

## 2022-12-11 PROCEDURE — 63600175 PHARM REV CODE 636 W HCPCS: Performed by: PHYSICIAN ASSISTANT

## 2022-12-11 RX ADMIN — CEFEPIME 2 G: 2 INJECTION, POWDER, FOR SOLUTION INTRAVENOUS at 09:12

## 2022-12-11 RX ADMIN — ENOXAPARIN SODIUM 40 MG: 40 INJECTION SUBCUTANEOUS at 05:12

## 2022-12-11 RX ADMIN — DAPTOMYCIN 815 MG: 350 INJECTION, POWDER, LYOPHILIZED, FOR SOLUTION INTRAVENOUS at 03:12

## 2022-12-11 RX ADMIN — TACROLIMUS 900 MG: 0.5 CAPSULE ORAL at 02:12

## 2022-12-11 RX ADMIN — OXYCODONE AND ACETAMINOPHEN 1 TABLET: 325; 10 TABLET ORAL at 03:12

## 2022-12-11 RX ADMIN — TACROLIMUS 900 MG: 0.5 CAPSULE ORAL at 05:12

## 2022-12-11 RX ADMIN — ACETAMINOPHEN 650 MG: 325 TABLET ORAL at 05:12

## 2022-12-11 RX ADMIN — CEFEPIME 2 G: 2 INJECTION, POWDER, FOR SOLUTION INTRAVENOUS at 04:12

## 2022-12-11 RX ADMIN — CEFEPIME 2 G: 2 INJECTION, POWDER, FOR SOLUTION INTRAVENOUS at 11:12

## 2022-12-11 RX ADMIN — CEFEPIME 2 G: 2 INJECTION, POWDER, FOR SOLUTION INTRAVENOUS at 12:12

## 2022-12-11 RX ADMIN — OXYCODONE AND ACETAMINOPHEN 1 TABLET: 325; 10 TABLET ORAL at 08:12

## 2022-12-11 RX ADMIN — OXYCODONE AND ACETAMINOPHEN 1 TABLET: 325; 10 TABLET ORAL at 09:12

## 2022-12-11 NOTE — PROGRESS NOTES
[facility] Medicine  Daily progress note    Patient Name: Jim Waddell  MRN: [id number]  Patient Class: IP- Inpatient  Admission Date: 12/6/2022  Attending Physician: Robbie Majano MD   Primary Care Provider: Primary Doctor No      Patient information was obtained from patient, past medical records and ER records.     Subjective:     Principal Problem:Cellulitis of right lower extremity    Chief Complaint:   Chief Complaint   Patient presents with    Leg Pain     Pt c/o left lower leg pain and swelling.  Reports redness behind knee and then blistering to calf. States he has been walking around a lot at [place] past few days.         HPI: This is a 38yo male with no reported past medical history who presents to the ED with chief complaint of RLE pain and swelling. Patient states that one week ago he was having URI symptoms and was treated with azithromycin and steroid injection at Urgent care. Later same week was still not feeling well and went to [facility] in [place] and states he got another steroid shot and also vitamin infusion. Reports feeling well the next couple days and went to [place]. On Friday patient had new onset fevers with high temp 102.8. Went to  in [place] on 12/3 and got levaquin and prednisone.  Next day patient had new onset lower calf pain and since that time pain has been progressively worsening with associated RLE swelling, erythema, warmth, tenderness. Denies numbness, tingling, nausea, vomiting, chest pain, shortness of breath, weakness, abdominal pain or confusion.     In the ED patient afebrile and hemodynamically stable saturating well on room air. WBC 26.48, LA 1.0. US RLE negative for DVT and Xray negative for acute fracture. Cellulitis skin changes present from low calf to mid medial thigh. Full ROM of all extremities and knee. ESR and CRP significantly elevated. Patient started on iv abx and admitted to the care of medicine for further evaluation and  management.      37-year-old male without significant past medical history presents with right lower extremity redness and tenderness consistent with cellulitis.  Imaging suggested the presence of a small fluid collection.  General surgery has been consulted and at this point does not feel that there is a drainable collection but will continue to follow.      On admission the patient was started on vancomycin and ceftriaxone, then transition to ceftriaxone and clindamycin. ID consulted as cellulitis remained severe.     CT 12/9 no evidence of subcu emphysema     Subjective:  NAEON. No chest pain, shortness of breath, lightheadedness.  Surgery signed off   Pain is much better controlled.     NAD, AO3  NC, AT  RRR  CTAB  SNTND+BS  Right lower extremity with less swelling and dull/ dark erythema now. Leg is still TTT.     Vitals, labs and radiographs from past 24h reviewed and personally interpreted.     Assessment/Plan:     * Cellulitis of right lower extremity  - general surgery was following and no surgical intervention warranted.   - ID consulted 12/8-- abx upgraded. Case re discussed with gen surg/ HM and ID.currently on ( clinda, cefepime and dapto)  - gen surg re eval-- CT : no evidence of subcu emphysema    - possible reimage tomorrow to reassess fluid collection   - pain control     Leukocytosis  - possibly secondary to recent steroid use. Also setting of cellulitis/infection also  - see management above    VTE Risk Mitigation (From admission, onward)           Ordered     enoxaparin injection 40 mg  Daily         12/06/22 1758     IP VTE HIGH RISK PATIENT  Once         12/06/22 1758     Place sequential compression device  Until discontinued         12/06/22 1758     Place sequential compression device  Until discontinued         12/06/22 0614                    Robbie Majano MD  Department of Hospital Medicine   [facility] - Emergency Dept

## 2022-12-11 NOTE — PROGRESS NOTES
[facility] - Intensive Care ([place] [address])  Infectious Disease  Progress Note    Patient Name: Jim Waddell  MRN: [id number]  Admission Date: 12/6/2022  Length of Stay: 3 days  Attending Physician: Robbie Majano MD  Primary Care Provider: Primary Doctor No    Isolation Status: No active isolations  Assessment/Plan:      * Cellulitis of right lower extremity  36 yo male with no significant PMHX pw RLE pain and cellulitis now with cf deep infection progressed on clindamycin and rocephin with MRI showing abscess and possible fascial involvement.  Per dw radiology abscess measures gastroc is 9.5 x 6.6 x 0.5 cm,  semimembranosus is 6.5 x 4.0 x 1.2 cm and both very thin.  Gen sx followed but rec no surgical intervention.  There was concern for possible developing necrotizing infection, got urgent re-eval by  sx who obtained LLE CT which was stable.   on admit.  Changed to Daptomycin, cefepime and clindamycin.      R leg improved more today- cellulitis resolved. US without cf nec fasc.  Gen sx following. Non septic appearing.    Plan:  · Continue Dapto  and cefepime  · Possible reimage on Monday to reassess fluid collection and if could be drained  · Seen by ID staff  · Judicious leg elevation - ankle>knee>hip  · Will follow.    Leukocytosis  Resolved        Anticipated Disposition: tbd    Thank you for your consult. I will follow-up with patient. Please contact us if you have any additional questions.    MELQUIADES Mckeon  Infectious Disease  [facility] - Intensive Care ([place] [address])    Subjective:     Principal Problem:Cellulitis of right lower extremity    HPI: 40 you male without significant PMHx pw RLE pain and swelling 12/6/22.  He had been tin [employer] last week and developed RLE pain/swelling and was seen by  there (last Fri) and given po prednisone and abx.  His leg pain worsened and he developed fever and chills all of which later improved though he continued with night sweats.  When  he returned the leg worsened with pain redness and swelling.  He presented to the ED for eval 12/6.  He was admitted with cf cellulitis.    On admit WBC was 26 and .  He was treated with vanc and rocephin then clinda/rocephin WBC improved then trended up to 14s.      MRI RLE done cf: regions of intramuscular edema, most pronounced in adductor deysi, biceps femoris, and gastrocnemius.  Intermuscular edema as well, most pronounced in the posterior thigh compartment.  There are curvilinear rim enhancing fluid collections along the posterior aspects of semimembranosus and medial gastrocnemius.     Gen Sx consulted abd followed x 2d but rec abx, no surgical intervention.  ID consulted for cellulitis.  Patient seen and still co of significant leg pain - 3 /10 at rest but 30/10 with walking.  Leg still swollen but now with progressive bruising over last 12 h in calf.  Reports leg previously weeping/blistering.  Denies injury to leg but possible bug bit behind knee.  Denies knee pain with movement.  No water animal/ water exposure  Remains on and tolerating clindamycin and ceftriaxone. Blood cultures NGTD.    Interval History:   No AEON.   Afebrile and WBC WNL.  Leg less red.  Pain/edema better.  Still unable to ambulated due to pain  The patient denies any recent fever, chills, or sweats.      Review of Systems   Constitutional:  Negative for chills, diaphoresis and fever.   Respiratory:  Negative for shortness of breath.    Cardiovascular:  Negative for chest pain.   Gastrointestinal:  Negative for abdominal pain, diarrhea, nausea and vomiting.   Genitourinary:  Negative for dysuria and hematuria.   Musculoskeletal:  Positive for myalgias.   Skin:  Positive for color change.   Objective:     Vital Signs (Most Recent):  Temp: 98 °F (36.7 °C) (12/11/22 1145)  Pulse: 83 (12/11/22 1145)  Resp: 18 (12/11/22 1145)  BP: 106/61 (12/11/22 1145)  SpO2: (!) 94 % (12/11/22 1145)   Vital Signs (24h Range):  Temp:  [98 °F (36.7  °C)-99 °F (37.2 °C)] 98 °F (36.7 °C)  Pulse:  [83-95] 83  Resp:  [16-18] 18  SpO2:  [93 %-96 %] 94 %  BP: (104-122)/(55-69) 106/61     Weight: 102.1 kg (225 lb)  Body mass index is 33.23 kg/m².    Estimated Creatinine Clearance: 133.7 mL/min (based on SCr of 0.9 mg/dL).    Physical Exam  Constitutional:       General: He is not in acute distress.     Appearance: Normal appearance. He is well-developed and normal weight. He is not ill-appearing, toxic-appearing or diaphoretic.       HENT:      Head: Normocephalic and atraumatic.   Cardiovascular:      Rate and Rhythm: Normal rate and regular rhythm.      Heart sounds: Normal heart sounds. No murmur heard.    No friction rub. No gallop.   Pulmonary:      Effort: Pulmonary effort is normal. No respiratory distress.      Breath sounds: Normal breath sounds. No wheezing or rales.   Abdominal:      General: Bowel sounds are normal. There is no distension.      Palpations: Abdomen is soft. There is no mass.      Tenderness: There is no abdominal tenderness. There is no guarding or rebound.   Skin:     General: Skin is warm and dry.   Neurological:      Mental Status: He is alert and oriented to person, place, and time.   Psychiatric:         Behavior: Behavior normal.   12/11            12/10 below                    12/9 below                  Significant Labs: Blood Culture:   Recent Labs   Lab 12/06/22  1651 12/06/22  1731   LABBLOO No Growth to date  No Growth to date  No Growth to date  No Growth to date  No Growth to date No Growth to date  No Growth to date  No Growth to date  No Growth to date  No Growth to date       CBC:   Recent Labs   Lab 12/10/22  1419   WBC 12.25   HGB 12.7*   HCT 37.1*          CMP:   Recent Labs   Lab 12/10/22  1419      K 4.2      CO2 27   GLU 99   BUN 14   CREATININE 0.9   CALCIUM 8.7   PROT 7.1   ALBUMIN 2.2*   BILITOT 0.6   ALKPHOS 73   AST 37   ALT 50*   ANIONGAP 7*       Wound Culture: No results for  input(s): LABAERO in the last 4320 hours.  All pertinent labs within the past 24 hours have been reviewed.    Significant Imaging: I have reviewed all pertinent imaging results/findings within the past 24 hours.  US Soft Tissue, Lower Extremity, Right [[id number]] Resulted: 12/09/22 2034   Order Status: Completed Updated: 12/09/22 2036   Narrative:     EXAMINATION:   US SOFT TISSUE, LOWER EXTREMITY, RIGHT     CLINICAL HISTORY:   cf abscess and nec fasciitis;     TECHNIQUE:   Duplex scan was performed using B-mode/grayscale imaging and color flow of the right thigh and calf.     COMPARISON:   CT right leg 12/09/2022.     FINDINGS:   Mild soft tissue edema in the right thigh and calf.  No discrete fluid collection to suggest abscess.  No foci of air.    Impression:       Right lower extremity edema with no evidence of abscess or necrotizing fasciitis.  Additional evaluation, as clinically warranted.     Electronically signed by resident: Gabrielle Lara   Date: 12/09/2022   Time: 20:30     Electronically signed by: Robin Sandoval MD   Date: 12/09/2022   Time: 20:34   CT Leg (Tibia-Fibula) Wtih Contrast Right [[id number]] Resulted: 12/09/22 2021   Order Status: Completed Updated: 12/09/22 2023   Narrative:     EXAMINATION:   CT THIGH WITH CONTRAST RIGHT; CT LEG (TIBIA-FIBULA) WITH CONTRAST RIGHT     CLINICAL HISTORY:   eval for abscess;; Soft tissue infection suspected, lower leg, xray done;     TECHNIQUE:   Axial images of the right thigh and right tibia/fibula were obtained at 1.25 mm intervals following administration of 100 cc omni 350 IV contrast.  Coronal and sagittal reformatted images were reviewed.     COMPARISON:   MRI tibia fibula 12/07/2022     FINDINGS:   No acute displaced fracture or dislocation of the right lower extremity or visualized portions of the ankle/foot.  There is mild induration involving the subcutaneous fat about the thigh proximally.  There is skin thickening involving the medial and lateral  aspects of the upper thigh, worsening caudally.  Disorganized fluid is noted throughout the subcutaneous fat along the posterior aspects of the thigh at the level of the mid thigh noting intermuscular edema and disorganized fluid toward the distal thigh.  Within the calf, there is continued skin thickening and induration of the subcutaneous fat.  There is more focal fluid along the lateral aspect of the gastrocnemius, extending posteriorly and medially.  Findings correlate with fluid collection seen on previous MRI, better defined on that exam, and not significantly changed.  The vascular structures of the thigh and lower leg appear patent allowing for contrast phase.    Impression:       1. Findings suggesting superficial cellulitis and skin thickening about the thigh noting reactive edema adjacent to the muscular sheaths.  There is intermuscular fluid suggesting superimposed myositis.  Given edema along the fascial planes, fasciitis is suspected.   2. Grossly stable appearing more focal collection along the posterior aspect of the calf extending medially as described.  On previous MRI, rim enhancement suggested abscess, the collection does not appear to be significantly changed since that time allowing for differences in modality.  Continued follow-up is advised.   3. No discrete subcutaneous or deep muscular emphysema at this time.       Electronically signed by: Nicola Esposito MD   Date: 12/09/2022   Time: 20:21   CT Thigh With Contrast Right [[id number]] Resulted: 12/09/22 2021   Order Status: Completed Updated: 12/09/22 2023   Narrative:     EXAMINATION:   CT THIGH WITH CONTRAST RIGHT; CT LEG (TIBIA-FIBULA) WITH CONTRAST RIGHT     CLINICAL HISTORY:   eval for abscess;; Soft tissue infection suspected, lower leg, xray done;     TECHNIQUE:   Axial images of the right thigh and right tibia/fibula were obtained at 1.25 mm intervals following administration of 100 cc omni 350 IV contrast.  Coronal and sagittal  reformatted images were reviewed.     COMPARISON:   MRI tibia fibula 12/07/2022     FINDINGS:   No acute displaced fracture or dislocation of the right lower extremity or visualized portions of the ankle/foot.  There is mild induration involving the subcutaneous fat about the thigh proximally.  There is skin thickening involving the medial and lateral aspects of the upper thigh, worsening caudally.  Disorganized fluid is noted throughout the subcutaneous fat along the posterior aspects of the thigh at the level of the mid thigh noting intermuscular edema and disorganized fluid toward the distal thigh.  Within the calf, there is continued skin thickening and induration of the subcutaneous fat.  There is more focal fluid along the lateral aspect of the gastrocnemius, extending posteriorly and medially.  Findings correlate with fluid collection seen on previous MRI, better defined on that exam, and not significantly changed.  The vascular structures of the thigh and lower leg appear patent allowing for contrast phase.    Impression:       1. Findings suggesting superficial cellulitis and skin thickening about the thigh noting reactive edema adjacent to the muscular sheaths.  There is intermuscular fluid suggesting superimposed myositis.  Given edema along the fascial planes, fasciitis is suspected.   2. Grossly stable appearing more focal collection along the posterior aspect of the calf extending medially as described.  On previous MRI, rim enhancement suggested abscess, the collection does not appear to be significantly changed since that time allowing for differences in modality.  Continued follow-up is advised.   3. No discrete subcutaneous or deep muscular emphysema at this time.       Electronically signed by: Nicola Esposito MD   Date: 12/09/2022   Time: 20:21   MRI Tibia Fibula W WO Contrast Right [[id number]] (Abnormal) Resulted: 12/07/22 1215   Order Status: Completed Updated: 12/07/22 1218   Narrative:      EXAMINATION:   MRI TIBIA FIBULA W WO CONTRAST RIGHT     CLINICAL HISTORY:   Soft tissue infection suspected, lower leg, xray done;     TECHNIQUE:   Multisequence multiplanar images of the right lower extremity centered about the knee were performed, before and after intravenous administration of 10 mL Gadavist.     COMPARISON:   Knee radiographs 12/06/2022     FINDINGS:   SOFT TISSUES: There is skin thickening and subcutaneous edema, most pronounced along the lateral aspect of the right lower extremity.  There are regions of intramuscular edema, most pronounced in adductor deysi, biceps femoris, and gastrocnemius.  No focal discontinuity of muscle fibers.  No focal muscle atrophy.  There is intermuscular edema as well, most pronounced in the posterior thigh compartment.  There are curvilinear rim enhancing fluid collections along the posterior aspects of semimembranosus and medial gastrocnemius (14:15 and 15:17).     BONES: No fracture, osteonecrosis, or focal lesion.  No evidence of osteomyelitis.     JOINTS: Small knee joint effusion.  No synovitis.     TENDONS: Visualized tendons are intact.     LIGAMENTS: Examination not tailored for assessment of knee ligaments.     OTHER: No popliteal lymphadenopathy.    Impression:       Inflammation of the skin, subcutaneous fat, muscles, and fascia, which could be inflammatory or infectious.     Curvilinear rim enhancing fluid collections along the posterior aspects of semimembranosus and medial gastrocnemius, concerning for abscess.     No evidence of osteomyelitis.     Small knee joint effusion, likely physiologic or reactive.     This report was flagged in Epic as abnormal.       Electronically signed by: Francesco Hayes   Date: 12/07/2022   Time: 12:15   US Lower Extremity Veins Right [[id number]] Resulted: 12/06/22 1652   Order Status: Completed Updated: 12/06/22 1654   Narrative:     EXAMINATION:   US LOWER EXTREMITY VEINS RIGHT     CLINICAL HISTORY:   Other specified  soft tissue disorders     TECHNIQUE:   Duplex and color flow Doppler evaluation and graded compression of the right lower extremity veins was performed.     COMPARISON:   None     FINDINGS:   Duplex and color flow Doppler evaluation does not reveal any evidence of acute venous thrombosis in the common femoral, superficial femoral, greater saphenous, popliteal, peroneal, anterior tibial and posterior tibial veins of the right lower extremity.  There is no reflux to suggest valvular incompetence.    Impression:       No evidence of deep venous thrombosis in the right lower extremity.       Electronically signed by: Nicola Esposito MD   Date: 12/06/2022   Time: 16:52   X-Ray Knee 3 View Right [[id number]] Resulted: 12/06/22 1505   Order Status: Completed Updated: 12/06/22 1507   Narrative:     EXAMINATION:   XR KNEE 3 VIEW RIGHT     CLINICAL HISTORY:   Pain in right knee     TECHNIQUE:   AP, lateral, and Merchant views of the right knee were performed.     COMPARISON:   None     FINDINGS:   Joint spaces maintained.  Bony structures are intact.  No joint effusion is seen.    Impression:       See above       Electronically signed by: Herbert Beltran MD   Date: 12/06/2022   Time: 15:05     Imaging History    2022    Date Procedure Name Study Review Link PACS Link Status Accession Number Location   12/09/22 08:23 PM US Soft Tissue, Lower Extremity, Right Study Review  Images Final [id number] [facility]   12/09/22 06:41 PM CT Leg (Tibia-Fibula) Wtih Contrast Right Study Review  Images Final [id number] [facility]   12/09/22 06:41 PM CT Thigh With Contrast Right Study Review  Images Final [id number] [facility]   12/07/22 11:50 AM MRI Tibia Fibula W WO Contrast Right Study Review  Images Final [id number] [facility]   12/06/22 04:38 PM US Lower Extremity Veins Right Study Review  Images Final [id number] [facility]   12/06/22 02:47 PM X-Ray Knee 3 View Right Study Review  Images Final [id number] [facility]

## 2022-12-11 NOTE — PROGRESS NOTES
[facility] - Intensive Care ([facility]-)  General Surgery  Progress Note    Subjective:     History of Present Illness:  Jim Waddell is a 37 y.o. gentleman with no significant PMH who presents with pain and swelling to his RLE for the past 5 days. He was at [place] walking around and noticed an erythematous rash that started on the back of his calf. It slowly worsened, and he presented to [facility] on 12/6. Reports a fever up to 102 and worsening swelling that improved with compression stockings. Prior to this episode, he was undergoing treatment for a presumed URI and received steroids and received azithromycin and levaquin as an outpatient. He denies numbness, weakness to his RLE. Labs significant for WBC 16. US without DVT in the RLE. MRI performed with curvilinear rim enhancing fluid collections along the posterior aspects of semimembranosus and medial gastrocnemius, concerning for abscess. He was started on IV antibiotics. General surgery consulted for evaluation of possible drainable abscess    Upon evaluation in his room he is AF and HDS. Reports that his erythema has improved, but has had worsening edema. Otherwise, No palpable areas of fluctuance, though he does have impressive edema. Palpable pulses bilaterally              Post-Op Info:  * No surgery found *         Interval History: AF, HDS. Erythema improving. Pain and swelling improved    Medications:  Continuous Infusions:  Scheduled Meds:   ceFEPime (MAXIPIME) IVPB EXTENDED INFUSION  2 g Intravenous Q8H    clindamycin (CLEOCIN) IVPB  900 mg Intravenous Q8H    DAPTOmycin (CUBICIN) IV (PEDS and ADULTS)  8 mg/kg Intravenous Q24H    enoxaparin  40 mg Subcutaneous Daily     PRN Meds:acetaminophen, albuterol-ipratropium, aluminum-magnesium hydroxide-simethicone, dextrose 10%, dextrose 10%, glucagon (human recombinant), glucose, glucose, HYDROmorphone, melatonin, naloxone, ondansetron, oxyCODONE, oxyCODONE-acetaminophen, senna-docusate 8.6-50 mg,  sodium chloride 0.9%, sodium chloride 0.9%     Review of patient's allergies indicates:   Allergen Reactions    Pcn [penicillins]      Objective:     Vital Signs (Most Recent):  Temp: 98.5 °F (36.9 °C) (12/11/22 0430)  Pulse: 88 (12/11/22 0430)  Resp: 18 (12/11/22 0430)  BP: (!) 122/58 (12/11/22 0430)  SpO2: (!) 93 % (12/11/22 0430) Vital Signs (24h Range):  Temp:  [96.9 °F (36.1 °C)-99 °F (37.2 °C)] 98.5 °F (36.9 °C)  Pulse:  [] 88  Resp:  [16-18] 18  SpO2:  [91 %-94 %] 93 %  BP: (104-122)/(55-60) 122/58     Weight: 102.1 kg (225 lb)  Body mass index is 33.23 kg/m².    Intake/Output - Last 3 Shifts         12/09 0700  12/10 0659 12/10 0700  12/11 0659 12/11 0700  12/12 0659    Urine (mL/kg/hr)  2900 (1.2)     Total Output  2900     Net  -2900            Urine Occurrence 1 x              Physical Exam  Constitutional:       General: He is not in acute distress.     Appearance: Normal appearance.   HENT:      Head: Normocephalic and atraumatic.   Cardiovascular:      Rate and Rhythm: Normal rate and regular rhythm.   Pulmonary:      Effort: Pulmonary effort is normal. No respiratory distress.      Breath sounds: Normal breath sounds.   Abdominal:      General: Abdomen is flat. There is no distension.      Palpations: Abdomen is soft.      Tenderness: There is no abdominal tenderness.   Musculoskeletal:         General: Swelling present.      Right lower leg: Edema present.      Comments: RLE edema. Associated erythema. Small blisters along posterior aspect of calf. No palpable areas of fluctuance. Tender to posterior calf   Neurological:      General: No focal deficit present.      Mental Status: He is alert and oriented to person, place, and time.   Psychiatric:         Behavior: Behavior normal.         Thought Content: Thought content normal.       Significant Labs:  I have reviewed all pertinent lab results within the past 24 hours.  CBC:   Recent Labs   Lab 12/10/22  1419   WBC 12.25   RBC 4.08*   HGB  12.7*   HCT 37.1*      MCV 91   MCH 31.1*   MCHC 34.2       CMP:   Recent Labs   Lab 12/10/22  1419   GLU 99   CALCIUM 8.7   ALBUMIN 2.2*   PROT 7.1      K 4.2   CO2 27      BUN 14   CREATININE 0.9   ALKPHOS 73   ALT 50*   AST 37   BILITOT 0.6         Significant Diagnostics:  I have reviewed all pertinent imaging results/findings within the past 24 hours.    Assessment/Plan:     * Cellulitis of right lower extremity  Jim Waddell is a 37 y.o. gentleman with RLE cellulitis    - low concern for NSTI  - on imaging, small fluid collection   - no drainable abscesses on physical exam  - would continue antibiotics  - may develop a drainable abscess later  - will sign off        Timbo Haskins MD  General Surgery  [facility] - Intensive Care ([facility]-)

## 2022-12-11 NOTE — ASSESSMENT & PLAN NOTE
38 yo male with no significant PMHX pw RLE pain and cellulitis now with cf deep infection progressed on clindamycin and rocephin with MRI showing abscess and possible fascial involvement.  Per dw radiology abscess measures gastroc is 9.5 x 6.6 x 0.5 cm,  semimembranosus is 6.5 x 4.0 x 1.2 cm and both very thin.  Gen sx followed but rec no surgical intervention.  There was concern for possible developing necrotizing infection, got urgent re-eval by gen chawlax who obtained LLE CT which was stable.   on admit.  Changed to Daptomycin, cefepime and clindamycin.      R leg improved more today- cellulitis resolved. US without cf nec fasc.  Gen sx following. Non septic appearing.    Plan:  · Continue Dapto  and cefepime  · Possible reimage on Monday to reassess fluid collection and if could be drained  · Seen by ID staff  · Judicious leg elevation - ankle>knee>hip  · Will follow.

## 2022-12-11 NOTE — SUBJECTIVE & OBJECTIVE
Interval History:   No AEON.   Afebrile and WBC WNL.  Leg less red.  Pain/edema better.  Still unable to ambulated due to pain  The patient denies any recent fever, chills, or sweats.      Review of Systems   Constitutional:  Negative for chills, diaphoresis and fever.   Respiratory:  Negative for shortness of breath.    Cardiovascular:  Negative for chest pain.   Gastrointestinal:  Negative for abdominal pain, diarrhea, nausea and vomiting.   Genitourinary:  Negative for dysuria and hematuria.   Musculoskeletal:  Positive for myalgias.   Skin:  Positive for color change.   Objective:     Vital Signs (Most Recent):  Temp: 98 °F (36.7 °C) (12/11/22 1145)  Pulse: 83 (12/11/22 1145)  Resp: 18 (12/11/22 1145)  BP: 106/61 (12/11/22 1145)  SpO2: (!) 94 % (12/11/22 1145)   Vital Signs (24h Range):  Temp:  [98 °F (36.7 °C)-99 °F (37.2 °C)] 98 °F (36.7 °C)  Pulse:  [83-95] 83  Resp:  [16-18] 18  SpO2:  [93 %-96 %] 94 %  BP: (104-122)/(55-69) 106/61     Weight: 102.1 kg (225 lb)  Body mass index is 33.23 kg/m².    Estimated Creatinine Clearance: 133.7 mL/min (based on SCr of 0.9 mg/dL).    Physical Exam  Constitutional:       General: He is not in acute distress.     Appearance: Normal appearance. He is well-developed and normal weight. He is not ill-appearing, toxic-appearing or diaphoretic.       HENT:      Head: Normocephalic and atraumatic.   Cardiovascular:      Rate and Rhythm: Normal rate and regular rhythm.      Heart sounds: Normal heart sounds. No murmur heard.    No friction rub. No gallop.   Pulmonary:      Effort: Pulmonary effort is normal. No respiratory distress.      Breath sounds: Normal breath sounds. No wheezing or rales.   Abdominal:      General: Bowel sounds are normal. There is no distension.      Palpations: Abdomen is soft. There is no mass.      Tenderness: There is no abdominal tenderness. There is no guarding or rebound.   Skin:     General: Skin is warm and dry.   Neurological:      Mental  Status: He is alert and oriented to person, place, and time.   Psychiatric:         Behavior: Behavior normal.   12/11            12/10 below                    12/9 below                  Significant Labs: Blood Culture:   Recent Labs   Lab 12/06/22  1651 12/06/22  1731   LABBLOO No Growth to date  No Growth to date  No Growth to date  No Growth to date  No Growth to date No Growth to date  No Growth to date  No Growth to date  No Growth to date  No Growth to date       CBC:   Recent Labs   Lab 12/10/22  1419   WBC 12.25   HGB 12.7*   HCT 37.1*          CMP:   Recent Labs   Lab 12/10/22  1419      K 4.2      CO2 27   GLU 99   BUN 14   CREATININE 0.9   CALCIUM 8.7   PROT 7.1   ALBUMIN 2.2*   BILITOT 0.6   ALKPHOS 73   AST 37   ALT 50*   ANIONGAP 7*       Wound Culture: No results for input(s): LABAERO in the last 4320 hours.  All pertinent labs within the past 24 hours have been reviewed.    Significant Imaging: I have reviewed all pertinent imaging results/findings within the past 24 hours.  US Soft Tissue, Lower Extremity, Right [[id number]] Resulted: 12/09/22 2034   Order Status: Completed Updated: 12/09/22 2036   Narrative:     EXAMINATION:   US SOFT TISSUE, LOWER EXTREMITY, RIGHT     CLINICAL HISTORY:   cf abscess and nec fasciitis;     TECHNIQUE:   Duplex scan was performed using B-mode/grayscale imaging and color flow of the right thigh and calf.     COMPARISON:   CT right leg 12/09/2022.     FINDINGS:   Mild soft tissue edema in the right thigh and calf.  No discrete fluid collection to suggest abscess.  No foci of air.    Impression:       Right lower extremity edema with no evidence of abscess or necrotizing fasciitis.  Additional evaluation, as clinically warranted.     Electronically signed by resident: Gabrielle Lara   Date: 12/09/2022   Time: 20:30     Electronically signed by: Robin Sandoval MD   Date: 12/09/2022   Time: 20:34   CT Leg (Tibia-Fibula) Wtih Contrast Right  [[id number]] Resulted: 12/09/22 2021   Order Status: Completed Updated: 12/09/22 2023   Narrative:     EXAMINATION:   CT THIGH WITH CONTRAST RIGHT; CT LEG (TIBIA-FIBULA) WITH CONTRAST RIGHT     CLINICAL HISTORY:   eval for abscess;; Soft tissue infection suspected, lower leg, xray done;     TECHNIQUE:   Axial images of the right thigh and right tibia/fibula were obtained at 1.25 mm intervals following administration of 100 cc omni 350 IV contrast.  Coronal and sagittal reformatted images were reviewed.     COMPARISON:   MRI tibia fibula 12/07/2022     FINDINGS:   No acute displaced fracture or dislocation of the right lower extremity or visualized portions of the ankle/foot.  There is mild induration involving the subcutaneous fat about the thigh proximally.  There is skin thickening involving the medial and lateral aspects of the upper thigh, worsening caudally.  Disorganized fluid is noted throughout the subcutaneous fat along the posterior aspects of the thigh at the level of the mid thigh noting intermuscular edema and disorganized fluid toward the distal thigh.  Within the calf, there is continued skin thickening and induration of the subcutaneous fat.  There is more focal fluid along the lateral aspect of the gastrocnemius, extending posteriorly and medially.  Findings correlate with fluid collection seen on previous MRI, better defined on that exam, and not significantly changed.  The vascular structures of the thigh and lower leg appear patent allowing for contrast phase.    Impression:       1. Findings suggesting superficial cellulitis and skin thickening about the thigh noting reactive edema adjacent to the muscular sheaths.  There is intermuscular fluid suggesting superimposed myositis.  Given edema along the fascial planes, fasciitis is suspected.   2. Grossly stable appearing more focal collection along the posterior aspect of the calf extending medially as described.  On previous MRI, rim enhancement  suggested abscess, the collection does not appear to be significantly changed since that time allowing for differences in modality.  Continued follow-up is advised.   3. No discrete subcutaneous or deep muscular emphysema at this time.       Electronically signed by: Nicola Esposito MD   Date: 12/09/2022   Time: 20:21   CT Thigh With Contrast Right [[id number]] Resulted: 12/09/22 2021   Order Status: Completed Updated: 12/09/22 2023   Narrative:     EXAMINATION:   CT THIGH WITH CONTRAST RIGHT; CT LEG (TIBIA-FIBULA) WITH CONTRAST RIGHT     CLINICAL HISTORY:   eval for abscess;; Soft tissue infection suspected, lower leg, xray done;     TECHNIQUE:   Axial images of the right thigh and right tibia/fibula were obtained at 1.25 mm intervals following administration of 100 cc omni 350 IV contrast.  Coronal and sagittal reformatted images were reviewed.     COMPARISON:   MRI tibia fibula 12/07/2022     FINDINGS:   No acute displaced fracture or dislocation of the right lower extremity or visualized portions of the ankle/foot.  There is mild induration involving the subcutaneous fat about the thigh proximally.  There is skin thickening involving the medial and lateral aspects of the upper thigh, worsening caudally.  Disorganized fluid is noted throughout the subcutaneous fat along the posterior aspects of the thigh at the level of the mid thigh noting intermuscular edema and disorganized fluid toward the distal thigh.  Within the calf, there is continued skin thickening and induration of the subcutaneous fat.  There is more focal fluid along the lateral aspect of the gastrocnemius, extending posteriorly and medially.  Findings correlate with fluid collection seen on previous MRI, better defined on that exam, and not significantly changed.  The vascular structures of the thigh and lower leg appear patent allowing for contrast phase.    Impression:       1. Findings suggesting superficial cellulitis and skin thickening about  the thigh noting reactive edema adjacent to the muscular sheaths.  There is intermuscular fluid suggesting superimposed myositis.  Given edema along the fascial planes, fasciitis is suspected.   2. Grossly stable appearing more focal collection along the posterior aspect of the calf extending medially as described.  On previous MRI, rim enhancement suggested abscess, the collection does not appear to be significantly changed since that time allowing for differences in modality.  Continued follow-up is advised.   3. No discrete subcutaneous or deep muscular emphysema at this time.       Electronically signed by: Nicola Esposito MD   Date: 12/09/2022   Time: 20:21   MRI Tibia Fibula W WO Contrast Right [[id number]] (Abnormal) Resulted: 12/07/22 1215   Order Status: Completed Updated: 12/07/22 1218   Narrative:     EXAMINATION:   MRI TIBIA FIBULA W WO CONTRAST RIGHT     CLINICAL HISTORY:   Soft tissue infection suspected, lower leg, xray done;     TECHNIQUE:   Multisequence multiplanar images of the right lower extremity centered about the knee were performed, before and after intravenous administration of 10 mL Gadavist.     COMPARISON:   Knee radiographs 12/06/2022     FINDINGS:   SOFT TISSUES: There is skin thickening and subcutaneous edema, most pronounced along the lateral aspect of the right lower extremity.  There are regions of intramuscular edema, most pronounced in adductor deysi, biceps femoris, and gastrocnemius.  No focal discontinuity of muscle fibers.  No focal muscle atrophy.  There is intermuscular edema as well, most pronounced in the posterior thigh compartment.  There are curvilinear rim enhancing fluid collections along the posterior aspects of semimembranosus and medial gastrocnemius (14:15 and 15:17).     BONES: No fracture, osteonecrosis, or focal lesion.  No evidence of osteomyelitis.     JOINTS: Small knee joint effusion.  No synovitis.     TENDONS: Visualized tendons are intact.      LIGAMENTS: Examination not tailored for assessment of knee ligaments.     OTHER: No popliteal lymphadenopathy.    Impression:       Inflammation of the skin, subcutaneous fat, muscles, and fascia, which could be inflammatory or infectious.     Curvilinear rim enhancing fluid collections along the posterior aspects of semimembranosus and medial gastrocnemius, concerning for abscess.     No evidence of osteomyelitis.     Small knee joint effusion, likely physiologic or reactive.     This report was flagged in Epic as abnormal.       Electronically signed by: Francesco Hayes   Date: 12/07/2022   Time: 12:15   US Lower Extremity Veins Right [[id number]] Resulted: 12/06/22 1652   Order Status: Completed Updated: 12/06/22 1654   Narrative:     EXAMINATION:   US LOWER EXTREMITY VEINS RIGHT     CLINICAL HISTORY:   Other specified soft tissue disorders     TECHNIQUE:   Duplex and color flow Doppler evaluation and graded compression of the right lower extremity veins was performed.     COMPARISON:   None     FINDINGS:   Duplex and color flow Doppler evaluation does not reveal any evidence of acute venous thrombosis in the common femoral, superficial femoral, greater saphenous, popliteal, peroneal, anterior tibial and posterior tibial veins of the right lower extremity.  There is no reflux to suggest valvular incompetence.    Impression:       No evidence of deep venous thrombosis in the right lower extremity.       Electronically signed by: Nicola Esposito MD   Date: 12/06/2022   Time: 16:52   X-Ray Knee 3 View Right [[id number]] Resulted: 12/06/22 1505   Order Status: Completed Updated: 12/06/22 1503   Narrative:     EXAMINATION:   XR KNEE 3 VIEW RIGHT     CLINICAL HISTORY:   Pain in right knee     TECHNIQUE:   AP, lateral, and Merchant views of the right knee were performed.     COMPARISON:   None     FINDINGS:   Joint spaces maintained.  Bony structures are intact.  No joint effusion is seen.    Impression:       See above        Electronically signed by: Herbert Beltran MD   Date: 12/06/2022   Time: 15:05     Imaging History    2022    Date Procedure Name Study Review Link PACS Link Status Accession Number Location   12/09/22 08:23 PM US Soft Tissue, Lower Extremity, Right Study Review  Images Final [id number] [facility]   12/09/22 06:41 PM CT Leg (Tibia-Fibula) Wtih Contrast Right Study Review  Images Final [id number] [facility]   12/09/22 06:41 PM CT Thigh With Contrast Right Study Review  Images Final [id number] [facility]   12/07/22 11:50 AM MRI Tibia Fibula W WO Contrast Right Study Review  Images Final [id number] [facility]   12/06/22 04:38 PM US Lower Extremity Veins Right Study Review  Images Final [id number] [facility]   12/06/22 02:47 PM X-Ray Knee 3 View Right Study Review  Images Final [id number] [facility]

## 2022-12-11 NOTE — SUBJECTIVE & OBJECTIVE
Interval History: AF, HDS. Erythema improving. Pain and swelling improved    Medications:  Continuous Infusions:  Scheduled Meds:   ceFEPime (MAXIPIME) IVPB EXTENDED INFUSION  2 g Intravenous Q8H    clindamycin (CLEOCIN) IVPB  900 mg Intravenous Q8H    DAPTOmycin (CUBICIN) IV (PEDS and ADULTS)  8 mg/kg Intravenous Q24H    enoxaparin  40 mg Subcutaneous Daily     PRN Meds:acetaminophen, albuterol-ipratropium, aluminum-magnesium hydroxide-simethicone, dextrose 10%, dextrose 10%, glucagon (human recombinant), glucose, glucose, HYDROmorphone, melatonin, naloxone, ondansetron, oxyCODONE, oxyCODONE-acetaminophen, senna-docusate 8.6-50 mg, sodium chloride 0.9%, sodium chloride 0.9%     Review of patient's allergies indicates:   Allergen Reactions    Pcn [penicillins]      Objective:     Vital Signs (Most Recent):  Temp: 98.5 °F (36.9 °C) (12/11/22 0430)  Pulse: 88 (12/11/22 0430)  Resp: 18 (12/11/22 0430)  BP: (!) 122/58 (12/11/22 0430)  SpO2: (!) 93 % (12/11/22 0430) Vital Signs (24h Range):  Temp:  [96.9 °F (36.1 °C)-99 °F (37.2 °C)] 98.5 °F (36.9 °C)  Pulse:  [] 88  Resp:  [16-18] 18  SpO2:  [91 %-94 %] 93 %  BP: (104-122)/(55-60) 122/58     Weight: 102.1 kg (225 lb)  Body mass index is 33.23 kg/m².    Intake/Output - Last 3 Shifts         12/09 0700  12/10 0659 12/10 0700 12/11 0659 12/11 0700 12/12 0659    Urine (mL/kg/hr)  2900 (1.2)     Total Output  2900     Net  -2900            Urine Occurrence 1 x              Physical Exam  Constitutional:       General: He is not in acute distress.     Appearance: Normal appearance.   HENT:      Head: Normocephalic and atraumatic.   Cardiovascular:      Rate and Rhythm: Normal rate and regular rhythm.   Pulmonary:      Effort: Pulmonary effort is normal. No respiratory distress.      Breath sounds: Normal breath sounds.   Abdominal:      General: Abdomen is flat. There is no distension.      Palpations: Abdomen is soft.      Tenderness: There is no abdominal  tenderness.   Musculoskeletal:         General: Swelling present.      Right lower leg: Edema present.      Comments: RLE edema. Associated erythema. Small blisters along posterior aspect of calf. No palpable areas of fluctuance. Tender to posterior calf   Neurological:      General: No focal deficit present.      Mental Status: He is alert and oriented to person, place, and time.   Psychiatric:         Behavior: Behavior normal.         Thought Content: Thought content normal.       Significant Labs:  I have reviewed all pertinent lab results within the past 24 hours.  CBC:   Recent Labs   Lab 12/10/22  1419   WBC 12.25   RBC 4.08*   HGB 12.7*   HCT 37.1*      MCV 91   MCH 31.1*   MCHC 34.2       CMP:   Recent Labs   Lab 12/10/22  1419   GLU 99   CALCIUM 8.7   ALBUMIN 2.2*   PROT 7.1      K 4.2   CO2 27      BUN 14   CREATININE 0.9   ALKPHOS 73   ALT 50*   AST 37   BILITOT 0.6         Significant Diagnostics:  I have reviewed all pertinent imaging results/findings within the past 24 hours.

## 2022-12-11 NOTE — ASSESSMENT & PLAN NOTE
Jim Gregg Bairon is a 37 y.o. gentleman with RLE cellulitis    - low concern for NSTI  - on imaging, small fluid collection   - no drainable abscesses on physical exam  - would continue antibiotics  - may develop a drainable abscess later  - will sign off

## 2022-12-11 NOTE — NURSING
REPORT REC., CARE ASSUMED, VSS, NAD, RLE RED SWOLLEN, TENDER TO TOUCH, ELEVATED ON PILLOW, WILL MONITOR.

## 2022-12-12 PROBLEM — M60.009 PYOMYOSITIS: Status: ACTIVE | Noted: 2022-12-12

## 2022-12-12 LAB
ALBUMIN SERPL BCP-MCNC: 2.6 G/DL (ref 3.5–5.2)
ALP SERPL-CCNC: 77 U/L (ref 55–135)
ALT SERPL W/O P-5'-P-CCNC: 76 U/L (ref 10–44)
ANION GAP SERPL CALC-SCNC: 6 MMOL/L (ref 8–16)
AST SERPL-CCNC: 60 U/L (ref 10–40)
BASOPHILS # BLD AUTO: 0.03 K/UL (ref 0–0.2)
BASOPHILS NFR BLD: 0.3 % (ref 0–1.9)
BILIRUB SERPL-MCNC: 0.6 MG/DL (ref 0.1–1)
BUN SERPL-MCNC: 14 MG/DL (ref 6–20)
CALCIUM SERPL-MCNC: 9.1 MG/DL (ref 8.7–10.5)
CHLORIDE SERPL-SCNC: 101 MMOL/L (ref 95–110)
CK SERPL-CCNC: 48 U/L (ref 20–200)
CO2 SERPL-SCNC: 28 MMOL/L (ref 23–29)
CREAT SERPL-MCNC: 0.9 MG/DL (ref 0.5–1.4)
CRP SERPL-MCNC: 64.1 MG/L (ref 0–8.2)
DIFFERENTIAL METHOD: ABNORMAL
EOSINOPHIL # BLD AUTO: 0.2 K/UL (ref 0–0.5)
EOSINOPHIL NFR BLD: 2.6 % (ref 0–8)
ERYTHROCYTE [DISTWIDTH] IN BLOOD BY AUTOMATED COUNT: 11.5 % (ref 11.5–14.5)
EST. GFR  (NO RACE VARIABLE): >60 ML/MIN/1.73 M^2
GLUCOSE SERPL-MCNC: 95 MG/DL (ref 70–110)
HCT VFR BLD AUTO: 39.8 % (ref 40–54)
HGB BLD-MCNC: 13.2 G/DL (ref 14–18)
IMM GRANULOCYTES # BLD AUTO: 0.19 K/UL (ref 0–0.04)
IMM GRANULOCYTES NFR BLD AUTO: 2.2 % (ref 0–0.5)
LYMPHOCYTES # BLD AUTO: 2.7 K/UL (ref 1–4.8)
LYMPHOCYTES NFR BLD: 30.5 % (ref 18–48)
MCH RBC QN AUTO: 30.8 PG (ref 27–31)
MCHC RBC AUTO-ENTMCNC: 33.2 G/DL (ref 32–36)
MCV RBC AUTO: 93 FL (ref 82–98)
MONOCYTES # BLD AUTO: 0.7 K/UL (ref 0.3–1)
MONOCYTES NFR BLD: 8 % (ref 4–15)
NEUTROPHILS # BLD AUTO: 4.9 K/UL (ref 1.8–7.7)
NEUTROPHILS NFR BLD: 56.4 % (ref 38–73)
NRBC BLD-RTO: 0 /100 WBC
PATH REV BLD -IMP: NORMAL
PLATELET # BLD AUTO: 448 K/UL (ref 150–450)
PMV BLD AUTO: 10.1 FL (ref 9.2–12.9)
POTASSIUM SERPL-SCNC: 4.6 MMOL/L (ref 3.5–5.1)
PROT SERPL-MCNC: 8.2 G/DL (ref 6–8.4)
RBC # BLD AUTO: 4.29 M/UL (ref 4.6–6.2)
SODIUM SERPL-SCNC: 135 MMOL/L (ref 136–145)
WBC # BLD AUTO: 8.71 K/UL (ref 3.9–12.7)

## 2022-12-12 PROCEDURE — 80053 COMPREHEN METABOLIC PANEL: CPT | Performed by: PHYSICIAN ASSISTANT

## 2022-12-12 PROCEDURE — 63600175 PHARM REV CODE 636 W HCPCS: Performed by: FAMILY MEDICINE

## 2022-12-12 PROCEDURE — 63600175 PHARM REV CODE 636 W HCPCS: Performed by: PHYSICIAN ASSISTANT

## 2022-12-12 PROCEDURE — 99233 PR SUBSEQUENT HOSPITAL CARE,LEVL III: ICD-10-PCS | Mod: ,,, | Performed by: INTERNAL MEDICINE

## 2022-12-12 PROCEDURE — 99233 SBSQ HOSP IP/OBS HIGH 50: CPT | Mod: ,,, | Performed by: STUDENT IN AN ORGANIZED HEALTH CARE EDUCATION/TRAINING PROGRAM

## 2022-12-12 PROCEDURE — 99233 SBSQ HOSP IP/OBS HIGH 50: CPT | Mod: ,,, | Performed by: INTERNAL MEDICINE

## 2022-12-12 PROCEDURE — 25000003 PHARM REV CODE 250: Performed by: FAMILY MEDICINE

## 2022-12-12 PROCEDURE — 85025 COMPLETE CBC W/AUTO DIFF WBC: CPT | Performed by: PHYSICIAN ASSISTANT

## 2022-12-12 PROCEDURE — 36415 COLL VENOUS BLD VENIPUNCTURE: CPT | Performed by: INTERNAL MEDICINE

## 2022-12-12 PROCEDURE — 94761 N-INVAS EAR/PLS OXIMETRY MLT: CPT

## 2022-12-12 PROCEDURE — 86140 C-REACTIVE PROTEIN: CPT | Performed by: PHYSICIAN ASSISTANT

## 2022-12-12 PROCEDURE — 82550 ASSAY OF CK (CPK): CPT | Performed by: INTERNAL MEDICINE

## 2022-12-12 PROCEDURE — 99233 PR SUBSEQUENT HOSPITAL CARE,LEVL III: ICD-10-PCS | Mod: ,,, | Performed by: STUDENT IN AN ORGANIZED HEALTH CARE EDUCATION/TRAINING PROGRAM

## 2022-12-12 PROCEDURE — 25000003 PHARM REV CODE 250: Performed by: PHYSICIAN ASSISTANT

## 2022-12-12 PROCEDURE — 25000003 PHARM REV CODE 250: Performed by: INTERNAL MEDICINE

## 2022-12-12 PROCEDURE — 12000002 HC ACUTE/MED SURGE SEMI-PRIVATE ROOM

## 2022-12-12 RX ORDER — CLINDAMYCIN PHOSPHATE 900 MG/50ML
900 INJECTION, SOLUTION INTRAVENOUS
Status: DISCONTINUED | OUTPATIENT
Start: 2022-12-12 | End: 2022-12-14

## 2022-12-12 RX ORDER — CLINDAMYCIN PHOSPHATE 150 MG/ML
900 INJECTION, SOLUTION INTRAVENOUS
Status: DISCONTINUED | OUTPATIENT
Start: 2022-12-12 | End: 2022-12-12

## 2022-12-12 RX ADMIN — ENOXAPARIN SODIUM 40 MG: 40 INJECTION SUBCUTANEOUS at 04:12

## 2022-12-12 RX ADMIN — DAPTOMYCIN 815 MG: 350 INJECTION, POWDER, LYOPHILIZED, FOR SOLUTION INTRAVENOUS at 04:12

## 2022-12-12 RX ADMIN — OXYCODONE AND ACETAMINOPHEN 1 TABLET: 325; 10 TABLET ORAL at 04:12

## 2022-12-12 RX ADMIN — CEFEPIME 2 G: 2 INJECTION, POWDER, FOR SOLUTION INTRAVENOUS at 09:12

## 2022-12-12 RX ADMIN — CEFEPIME 2 G: 2 INJECTION, POWDER, FOR SOLUTION INTRAVENOUS at 04:12

## 2022-12-12 RX ADMIN — OXYCODONE AND ACETAMINOPHEN 1 TABLET: 325; 10 TABLET ORAL at 09:12

## 2022-12-12 RX ADMIN — TACROLIMUS 900 MG: 0.5 CAPSULE ORAL at 08:12

## 2022-12-12 RX ADMIN — OXYCODONE HYDROCHLORIDE 15 MG: 10 TABLET ORAL at 09:12

## 2022-12-12 NOTE — PLAN OF CARE
[facility] - [facility] ([place] [id number])  Discharge Reassessment    Primary Care Provider: Primary Doctor No    Expected Discharge Date: 12/13/2022    Pt is not yet med ready for discharge as pt is pending ID work-up and pain control per Dr. Valle.      Reassessment (most recent)       Discharge Reassessment - 12/12/22 1526          Discharge Reassessment    Assessment Type Discharge Planning Reassessment     Discharge Plan A Home Health     Discharge Plan B Home with family     DME Needed Upon Discharge  crutches     Discharge Barriers Identified None     Why the patient remains in the hospital Requires continued medical care        Post-Acute Status    Post-Acute Authorization Home Health     Discharge Delays None known at this time                     SW will continue to coordinate with patient, family, team and insurance to complete patient's discharge plan.    Jeny Renee LMSW  [id number]/[phone]

## 2022-12-12 NOTE — PLAN OF CARE
Problem: Adult Inpatient Plan of Care  Goal: Plan of Care Review  Outcome: Ongoing, Progressing  Goal: Patient-Specific Goal (Individualized)  Outcome: Ongoing, Progressing  Goal: Absence of Hospital-Acquired Illness or Injury  Outcome: Ongoing, Progressing  Goal: Optimal Comfort and Wellbeing  Outcome: Ongoing, Progressing     Problem: Impaired Wound Healing  Goal: Optimal Wound Healing  Outcome: Ongoing, Progressing     POC reviewed with patient. All questions and concerns addressed. Fall/safety precautions implemented and maintained. Urinal provided and within reach. Pain management provided. IV abx administered. No acute events noted this shift. Please see flowsheet for full assessment and vitals. Bed locked in lowest position. Side rails up x2. Call bell within reach. Will continue to monitor.

## 2022-12-12 NOTE — NURSING
REPORT REC., CARE ASSUMED, VSS, NAD, RLE RED,  SWOLLEN, TENDER TO TOUCH, ELEVATED ON PILLOW, WILL MONITOR.

## 2022-12-13 LAB
ALBUMIN SERPL BCP-MCNC: 2.5 G/DL (ref 3.5–5.2)
ALP SERPL-CCNC: 71 U/L (ref 55–135)
ALT SERPL W/O P-5'-P-CCNC: 87 U/L (ref 10–44)
ANION GAP SERPL CALC-SCNC: 7 MMOL/L (ref 8–16)
AST SERPL-CCNC: 64 U/L (ref 10–40)
BASOPHILS # BLD AUTO: 0.06 K/UL (ref 0–0.2)
BASOPHILS NFR BLD: 0.5 % (ref 0–1.9)
BILIRUB SERPL-MCNC: 0.6 MG/DL (ref 0.1–1)
BUN SERPL-MCNC: 16 MG/DL (ref 6–20)
CALCIUM SERPL-MCNC: 9.4 MG/DL (ref 8.7–10.5)
CHLORIDE SERPL-SCNC: 103 MMOL/L (ref 95–110)
CO2 SERPL-SCNC: 25 MMOL/L (ref 23–29)
CREAT SERPL-MCNC: 0.8 MG/DL (ref 0.5–1.4)
DIFFERENTIAL METHOD: ABNORMAL
EOSINOPHIL # BLD AUTO: 0.3 K/UL (ref 0–0.5)
EOSINOPHIL NFR BLD: 2.5 % (ref 0–8)
ERYTHROCYTE [DISTWIDTH] IN BLOOD BY AUTOMATED COUNT: 11.7 % (ref 11.5–14.5)
EST. GFR  (NO RACE VARIABLE): >60 ML/MIN/1.73 M^2
GLUCOSE SERPL-MCNC: 91 MG/DL (ref 70–110)
HCT VFR BLD AUTO: 38.4 % (ref 40–54)
HGB BLD-MCNC: 13 G/DL (ref 14–18)
IMM GRANULOCYTES # BLD AUTO: 0.16 K/UL (ref 0–0.04)
IMM GRANULOCYTES NFR BLD AUTO: 1.4 % (ref 0–0.5)
LYMPHOCYTES # BLD AUTO: 3 K/UL (ref 1–4.8)
LYMPHOCYTES NFR BLD: 26.7 % (ref 18–48)
MAGNESIUM SERPL-MCNC: 2.1 MG/DL (ref 1.6–2.6)
MCH RBC QN AUTO: 31.1 PG (ref 27–31)
MCHC RBC AUTO-ENTMCNC: 33.9 G/DL (ref 32–36)
MCV RBC AUTO: 92 FL (ref 82–98)
MONOCYTES # BLD AUTO: 0.7 K/UL (ref 0.3–1)
MONOCYTES NFR BLD: 6.5 % (ref 4–15)
NEUTROPHILS # BLD AUTO: 7 K/UL (ref 1.8–7.7)
NEUTROPHILS NFR BLD: 62.4 % (ref 38–73)
NRBC BLD-RTO: 0 /100 WBC
PHOSPHATE SERPL-MCNC: 3.7 MG/DL (ref 2.7–4.5)
PLATELET # BLD AUTO: 413 K/UL (ref 150–450)
PMV BLD AUTO: 10.1 FL (ref 9.2–12.9)
POTASSIUM SERPL-SCNC: 4.3 MMOL/L (ref 3.5–5.1)
PROT SERPL-MCNC: 7.9 G/DL (ref 6–8.4)
RBC # BLD AUTO: 4.18 M/UL (ref 4.6–6.2)
SODIUM SERPL-SCNC: 135 MMOL/L (ref 136–145)
WBC # BLD AUTO: 11.23 K/UL (ref 3.9–12.7)

## 2022-12-13 PROCEDURE — 80053 COMPREHEN METABOLIC PANEL: CPT | Performed by: INTERNAL MEDICINE

## 2022-12-13 PROCEDURE — 25500020 PHARM REV CODE 255: Performed by: INTERNAL MEDICINE

## 2022-12-13 PROCEDURE — 84100 ASSAY OF PHOSPHORUS: CPT | Performed by: INTERNAL MEDICINE

## 2022-12-13 PROCEDURE — 99233 SBSQ HOSP IP/OBS HIGH 50: CPT | Mod: ,,, | Performed by: INTERNAL MEDICINE

## 2022-12-13 PROCEDURE — 99233 SBSQ HOSP IP/OBS HIGH 50: CPT | Mod: ,,, | Performed by: STUDENT IN AN ORGANIZED HEALTH CARE EDUCATION/TRAINING PROGRAM

## 2022-12-13 PROCEDURE — 85025 COMPLETE CBC W/AUTO DIFF WBC: CPT | Performed by: INTERNAL MEDICINE

## 2022-12-13 PROCEDURE — 63600175 PHARM REV CODE 636 W HCPCS: Performed by: PHYSICIAN ASSISTANT

## 2022-12-13 PROCEDURE — 12000002 HC ACUTE/MED SURGE SEMI-PRIVATE ROOM

## 2022-12-13 PROCEDURE — 83735 ASSAY OF MAGNESIUM: CPT | Performed by: INTERNAL MEDICINE

## 2022-12-13 PROCEDURE — 99233 PR SUBSEQUENT HOSPITAL CARE,LEVL III: ICD-10-PCS | Mod: ,,, | Performed by: INTERNAL MEDICINE

## 2022-12-13 PROCEDURE — 63600175 PHARM REV CODE 636 W HCPCS: Performed by: FAMILY MEDICINE

## 2022-12-13 PROCEDURE — 99233 PR SUBSEQUENT HOSPITAL CARE,LEVL III: ICD-10-PCS | Mod: ,,, | Performed by: STUDENT IN AN ORGANIZED HEALTH CARE EDUCATION/TRAINING PROGRAM

## 2022-12-13 PROCEDURE — 25000003 PHARM REV CODE 250: Performed by: INTERNAL MEDICINE

## 2022-12-13 PROCEDURE — 25000003 PHARM REV CODE 250: Performed by: PHYSICIAN ASSISTANT

## 2022-12-13 PROCEDURE — 36415 COLL VENOUS BLD VENIPUNCTURE: CPT | Performed by: INTERNAL MEDICINE

## 2022-12-13 RX ADMIN — TACROLIMUS 900 MG: 0.5 CAPSULE ORAL at 09:12

## 2022-12-13 RX ADMIN — OXYCODONE HYDROCHLORIDE 15 MG: 10 TABLET ORAL at 02:12

## 2022-12-13 RX ADMIN — IOHEXOL 100 ML: 350 INJECTION, SOLUTION INTRAVENOUS at 01:12

## 2022-12-13 RX ADMIN — CEFEPIME 2 G: 2 INJECTION, POWDER, FOR SOLUTION INTRAVENOUS at 12:12

## 2022-12-13 RX ADMIN — DAPTOMYCIN 815 MG: 350 INJECTION, POWDER, LYOPHILIZED, FOR SOLUTION INTRAVENOUS at 03:12

## 2022-12-13 RX ADMIN — TACROLIMUS 900 MG: 0.5 CAPSULE ORAL at 02:12

## 2022-12-13 RX ADMIN — CEFEPIME 2 G: 2 INJECTION, POWDER, FOR SOLUTION INTRAVENOUS at 09:12

## 2022-12-13 RX ADMIN — CEFEPIME 2 G: 2 INJECTION, POWDER, FOR SOLUTION INTRAVENOUS at 05:12

## 2022-12-13 RX ADMIN — TACROLIMUS 900 MG: 0.5 CAPSULE ORAL at 04:12

## 2022-12-13 RX ADMIN — ENOXAPARIN SODIUM 40 MG: 40 INJECTION SUBCUTANEOUS at 05:12

## 2022-12-13 RX ADMIN — OXYCODONE HYDROCHLORIDE 15 MG: 10 TABLET ORAL at 04:12

## 2022-12-13 RX ADMIN — OXYCODONE HYDROCHLORIDE 15 MG: 10 TABLET ORAL at 09:12

## 2022-12-13 NOTE — ASSESSMENT & PLAN NOTE
38 yo male with no significant PMHX pw RLE pain and cellulitis with cf deep infection (pyomyositis / fascitis) progressed on clindamycin and rocephin with MRI showing abscess and possible myositis/ fascial involvement. Per dw radiology abscess measures gastroc is 9.5 x 6.6 x 0.5 cm,  semimembranosus is 6.5 x 4.0 x 1.2 cm and both very thin. Blood cxs NG. Gen sx consulted for urgent eval due to concerns for NSTI; obtained LLE CT which was stable. U/S w/o evidence cf NSTI. Gen surg rec no surgical intervention, favoring medical management for SSTI, with low level concern for NSTI.    on admit.  -- D/c'd ceftriaxone 12/09; changed to Daptomycin, cefepime and clindamycin; with clinda d/c'd after approx. 5d on 12/11.   -- Patient continuing on IV-Dapto and Cefepime. Overall, pt improving; remains stable, non-septic, leukocytosis resolved, afebrile >48hrs. CRP improved (124), down from 280 on admit. Cellulitis component nearly resolved; but still present on exam includes: induration, swelling, and increased warmth at posterior muscle compartments of thigh and calf; as well as severe dependent leg pain (10/10) when leg hanging down or even standing w/o weight bearing. In addition, today pt reports increased swelling and redness at affected right calf compared to yesterday, which may be more edema related; rather than infectious etiology. However, in context of low level concern for NSTI and with reported change in sxs <24hrs after discontinuing Clinda,-- would currently recommend adding Clinda back to empiric abx.      Recommendations/Plan:  1. Continue IV Dapto and Cefepime  2. Recommend adding back Clindamycin to empiric abx for reasons mentioned above.  3. Recommend repeat CT w/ contrast imaging of right thigh and tib-fib, for further eval of fluid collections at posterior thigh and calf.  4. If repeat imaging findings do not meet criteria for drain / source control, then recommend transition to oral abx prior to  d/c, with tentative outpt abx regimen: PO-Doxycycline 100mg BID, Cefpodoxime 400mg Q12hrs, and Clindamycin 450mg Q6h -- for recommended duration of 2-3wks s/p start date of oral abx regimen.  5. Recommend continuing judicious leg elevation - ankle>knee>hip  6. Recommend pain management and possibly PT for care needs related to mobility and severe dependent leg pain when leg hanging down.  6. Discussed with ID Staff -- will re-discuss tomorrow duration of therapy 2wks vs. 3wks for SSTI w/ pyomyositis/fascitis.   -- ID final recs pending further staff discussion and repeat imaging.   -- ID will continue to follow

## 2022-12-13 NOTE — PROGRESS NOTES
[facility] Medicine  Daily progress note    Patient Name: Jim Waddell  MRN: [id number]  Patient Class: IP- Inpatient  Admission Date: 12/6/2022  Attending Physician: Dilan Valle MD   Primary Care Provider: Primary Doctor No      Patient information was obtained from patient, past medical records and ER records.     Subjective:     Principal Problem:Cellulitis of right lower extremity    Chief Complaint:   Chief Complaint   Patient presents with    Leg Pain     Pt c/o left lower leg pain and swelling.  Reports redness behind knee and then blistering to calf. States he has been walking around a lot at [place] past few days.         HPI: This is a 36yo male with no reported past medical history who presents to the ED with chief complaint of RLE pain and swelling. Patient states that one week ago he was having URI symptoms and was treated with azithromycin and steroid injection at Urgent care. Later same week was still not feeling well and went to [facility] in [place] and states he got another steroid shot and also vitamin infusion. Reports feeling well the next couple days and went to [place]. On Friday patient had new onset fevers with high temp 102.8. Went to  in [place] on 12/3 and got levaquin and prednisone.  Next day patient had new onset lower calf pain and since that time pain has been progressively worsening with associated RLE swelling, erythema, warmth, tenderness. Denies numbness, tingling, nausea, vomiting, chest pain, shortness of breath, weakness, abdominal pain or confusion.     In the ED patient afebrile and hemodynamically stable saturating well on room air. WBC 26.48, LA 1.0. US RLE negative for DVT and Xray negative for acute fracture. Cellulitis skin changes present from low calf to mid medial thigh. Full ROM of all extremities and knee. ESR and CRP significantly elevated. Patient started on iv abx and admitted to the care of medicine for further evaluation and  management.      37-year-old male without significant past medical history presents with right lower extremity redness and tenderness consistent with cellulitis.  Imaging suggested the presence of a small fluid collection.  General surgery has been consulted and at this point does not feel that there is a drainable collection but will continue to follow.      On admission the patient was started on vancomycin and ceftriaxone, then transition to ceftriaxone and clindamycin. ID consulted as cellulitis remained severe.         Subjective:  NAEON. No chest pain, shortness of breath, lightheadedness.  Surgery signed off   Pain controlled by IV meds but not oral meds    NAD, AO3  NC, AT  RRR  CTAB  SNTND+BS  Right lower extremity with less swelling and dull/ dark erythema now. Leg is still TTT. Improving.     Vitals, labs and radiographs from past 24h reviewed and personally interpreted.     Assessment/Plan:     * Cellulitis of right lower extremity  - general surgery was following and no surgical intervention warranted.   - ID consulted 12/8-- abx upgraded. Case re discussed with gen surg/ HM and ID.currently on ( clinda, cefepime and dapto)  - gen surg re eval-- CT : no evidence of subcu emphysema    -re-image per ID rec  -resume clinda per ID rec    Leukocytosis  - possibly secondary to recent steroid use. Also setting of cellulitis/infection also  - see management above    VTE Risk Mitigation (From admission, onward)           Ordered     enoxaparin injection 40 mg  Daily         12/06/22 1758     IP VTE HIGH RISK PATIENT  Once         12/06/22 1758     Place sequential compression device  Until discontinued         12/06/22 1758     Place sequential compression device  Until discontinued         12/06/22 1745                    Dilan Valle MD  Department of Hospital Medicine   [facility] - Emergency Dept

## 2022-12-13 NOTE — SUBJECTIVE & OBJECTIVE
Interval History: NAEON. Pt with some increased induration, swelling, and superficial hemorraghic skin changes w/ some redness at right calf. Otherwise, VSS, HDS. Remains afebrile >48hrs, and without leukocytosis, CRP (64) continued down trend; and ESR (89) slightly up from 80 on admit 12/06. Patient still having severe dependent leg pain 10/10 when leg hanging down / standing w/o WB' compared to 3/10 when at rest w/ elevation, comfortable/tolerable.   (12/13) Repeat CT-Thigh & Tib-fib: c/w worsening SSTI w/ increased size of fluid collections and early abscess development.  1. Interval development of 3 low-attenuation, rim enhancing complex fluid collections along posterior surface of semimembranosus(1.3 x 2.2 x 10.5 cm) , medial gastrocnemius (1.2 x 4 x 13.1 cm ) and lateral gastrocnemius (1.5 x 2.6 x 0.2 cm), which is most concerning for early abscesses.    2. No CT findings to suggest superimposed gas, however, component of necrotizing fascitis cannot be excluded.    3. Additional ill-defined low-attenuation fluid with surrounding inflammatory change at the level of the popliteal fossa, most concerning for phlegmonous change noting lack of clear organization and rim enhancement.     Review of Systems   Constitutional:  Positive for appetite change. Negative for chills, diaphoresis, fatigue and fever.   HENT:  Negative for congestion, dental problem, sore throat and trouble swallowing.    Eyes:  Negative for pain and visual disturbance.   Respiratory:  Negative for cough and wheezing.    Cardiovascular:  Positive for leg swelling. Negative for chest pain and palpitations.   Gastrointestinal:  Negative for abdominal pain, constipation, diarrhea, nausea and vomiting.   Genitourinary:  Negative for dysuria, flank pain, hematuria and urgency.   Musculoskeletal:  Positive for arthralgias, joint swelling and myalgias. Negative for back pain, gait problem, neck pain and neck stiffness.   Skin:  Positive for color  change, rash and wound.   Neurological:  Negative for seizures and headaches.   Psychiatric/Behavioral:  Negative for behavioral problems and confusion. The patient is not nervous/anxious.    Objective:     Vital Signs (Most Recent):  Temp: 98.2 °F (36.8 °C) (12/13/22 0724)  Pulse: 89 (12/13/22 0724)  Resp: 18 (12/13/22 0724)  BP: 110/63 (12/13/22 0724)  SpO2: (!) 90 % (12/13/22 0724)   Vital Signs (24h Range):  Temp:  [97.4 °F (36.3 °C)-98.9 °F (37.2 °C)] 98.2 °F (36.8 °C)  Pulse:  [82-96] 89  Resp:  [18-20] 18  SpO2:  [90 %-93 %] 90 %  BP: (110-118)/(58-69) 110/63     Weight: 102.1 kg (225 lb)  Body mass index is 33.23 kg/m².    Estimated Creatinine Clearance: 150.4 mL/min (based on SCr of 0.8 mg/dL).    Physical Exam  Constitutional:       General: He is not in acute distress.     Appearance: Normal appearance. He is not ill-appearing, toxic-appearing or diaphoretic.   HENT:      Head: Normocephalic and atraumatic.      Mouth/Throat:      Mouth: Mucous membranes are moist.      Pharynx: Oropharynx is clear.   Eyes:      General: No scleral icterus.     Conjunctiva/sclera: Conjunctivae normal.   Cardiovascular:      Rate and Rhythm: Normal rate and regular rhythm.      Pulses: Normal pulses.      Heart sounds: Normal heart sounds.   Pulmonary:      Effort: Pulmonary effort is normal.      Breath sounds: Normal breath sounds.   Abdominal:      General: Bowel sounds are normal. There is no distension.      Palpations: Abdomen is soft.      Tenderness: There is no abdominal tenderness.   Musculoskeletal:         General: Swelling and tenderness present.      Cervical back: Normal range of motion. No rigidity or tenderness.      Right lower leg: Edema present.      Left lower leg: No edema.   Lymphadenopathy:      Cervical: No cervical adenopathy.   Skin:     General: Skin is warm and dry.      Capillary Refill: Capillary refill takes less than 2 seconds.      Findings: Bruising and erythema present.      Comments:  Induration noted at posterior compartments of right thigh and calf. Still with severe dependent Leg pain (10/10) mostly distal to knee, at calf; noting pain exacerbated when leg hanging down vertical w/ gravity. At rest with leg elevated, pain 3/10, comfortable/ tolerable.    Neurological:      Mental Status: He is alert and oriented to person, place, and time. Mental status is at baseline.   Psychiatric:         Mood and Affect: Mood normal.         Behavior: Behavior normal.         Thought Content: Thought content normal.         Judgment: Judgment normal.           Significant Labs: All pertinent labs within the past 24 hours have been reviewed.    Significant Imaging: I have reviewed all pertinent imaging results/findings within the past 24 hours.

## 2022-12-13 NOTE — SUBJECTIVE & OBJECTIVE
Interval History: Some increased swelling and redness at right calf, relative to yesterday. Otherwise, VSS, HDS. Remains afebrile >48hrs, and without leukocytosis, CRP (64) continued down trend; and ESR (89) slightly up from 80 on admit 12/06. Patient still having severe dependent leg pain 10/10 when leg hanging down / standing w/o WB; at rest w/ elevation 3/10, comfortable/tolerable.   -- awaiting repeat CT of thigh and tib-fib for re-eval of fluid collections.       Review of Systems   Constitutional:  Positive for appetite change. Negative for chills, diaphoresis, fatigue and fever.   HENT:  Negative for congestion, dental problem, sore throat and trouble swallowing.    Eyes:  Negative for pain and visual disturbance.   Respiratory:  Negative for cough and wheezing.    Cardiovascular:  Positive for leg swelling. Negative for chest pain and palpitations.   Gastrointestinal:  Negative for abdominal pain, constipation, diarrhea, nausea and vomiting.   Genitourinary:  Negative for dysuria, flank pain, hematuria and urgency.   Musculoskeletal:  Positive for arthralgias, joint swelling and myalgias. Negative for back pain, gait problem, neck pain and neck stiffness.   Skin:  Positive for color change, rash and wound.   Neurological:  Negative for seizures and headaches.   Psychiatric/Behavioral:  Negative for behavioral problems and confusion. The patient is not nervous/anxious.    Objective:     Vital Signs (Most Recent):  Temp: 97.4 °F (36.3 °C) (12/12/22 1512)  Pulse: 93 (12/12/22 1512)  Resp: 18 (12/12/22 1610)  BP: 116/69 (12/12/22 1512)  SpO2: (!) 92 % (12/12/22 1512)   Vital Signs (24h Range):  Temp:  [97.4 °F (36.3 °C)-98.6 °F (37 °C)] 97.4 °F (36.3 °C)  Pulse:  [82-93] 93  Resp:  [16-21] 18  SpO2:  [92 %-95 %] 92 %  BP: (111-123)/(65-77) 116/69     Weight: 102.1 kg (225 lb)  Body mass index is 33.23 kg/m².    Estimated Creatinine Clearance: 133.7 mL/min (based on SCr of 0.9 mg/dL).    Physical  Exam  Constitutional:       General: He is not in acute distress.     Appearance: Normal appearance. He is not ill-appearing, toxic-appearing or diaphoretic.   HENT:      Head: Normocephalic and atraumatic.      Mouth/Throat:      Mouth: Mucous membranes are moist.      Pharynx: Oropharynx is clear.   Eyes:      General: No scleral icterus.     Conjunctiva/sclera: Conjunctivae normal.   Cardiovascular:      Rate and Rhythm: Normal rate and regular rhythm.      Pulses: Normal pulses.      Heart sounds: Normal heart sounds.   Pulmonary:      Effort: Pulmonary effort is normal.      Breath sounds: Normal breath sounds.   Abdominal:      General: Bowel sounds are normal. There is no distension.      Palpations: Abdomen is soft.      Tenderness: There is no abdominal tenderness.   Musculoskeletal:         General: Swelling and tenderness present.      Cervical back: Normal range of motion. No rigidity or tenderness.      Right lower leg: Edema present.      Left lower leg: No edema.   Lymphadenopathy:      Cervical: No cervical adenopathy.   Skin:     General: Skin is warm and dry.      Capillary Refill: Capillary refill takes less than 2 seconds.      Findings: Bruising and erythema present.      Comments: Induration noted at posterior compartments of right thigh and calf. Still with severe dependent Leg pain (10/10) mostly distal to knee, at calf; noting pain exacerbated when leg hanging down vertical w/ gravity. At rest with leg elevated, pain 3/10, comfortable/ tolerable.    Neurological:      Mental Status: He is alert and oriented to person, place, and time. Mental status is at baseline.   Psychiatric:         Mood and Affect: Mood normal.         Behavior: Behavior normal.         Thought Content: Thought content normal.         Judgment: Judgment normal.           Significant Labs: All pertinent labs within the past 24 hours have been reviewed.    Significant Imaging: I have reviewed all pertinent imaging  results/findings within the past 24 hours.

## 2022-12-13 NOTE — PROGRESS NOTES
[facility] ([place] [address])  Infectious Disease  Progress Note    Patient Name: Jim Waddell  MRN: [id number]  Admission Date: 12/6/2022  Length of Stay: 5 days  Attending Physician: Dilan Valle MD  Primary Care Provider: Primary Doctor No    Isolation Status: No active isolations  Assessment/Plan:      * Cellulitis of right lower extremity  See Pyomyositis for A/P.    Pyomyositis  SSTI w/ pyomyositis/fascitis [with low level suspicion for NSTI]--  38 yo male with no significant PMHX pw RLE pain and cellulitis with cf deep infection (pyomyositis / fascitis) progressed on clindamycin and rocephin with MRI showing abscess and possible myositis/ fascial involvement. Per dw radiology abscess measures gastroc is 9.5 x 6.6 x 0.5 cm,  semimembranosus is 6.5 x 4.0 x 1.2 cm and both very thin. Blood cxs NG.  on admit. Gen sx consulted for urgent eval due to concerns for NSTI; obtained LLE CT which was stable. U/S w/o evidence cf NSTI. Gen surg rec no surgical intervention, favoring medical management for SSTI, with low level concern for NSTI.   -- D/c'd ceftriaxone 12/09; changed to Daptomycin, cefepime and clindamycin; [with clinda d/c'd after approx. 5d on 12/11].  -- Patient continuing on IV-Dapto and Cefepime. Overall, pt improving; remains stable, non-septic, leukocytosis resolved, afebrile >48hrs. CRP continuing to improve (64), down from 280 on admit. Cellulitis component nearly resolved; but there remains induration, swelling, and increased warmth at posterior muscle compartments of thigh and calf; as well as severe dependent leg pain (10/10) when leg hanging down or even standing w/o weight bearing.   (12/13) Repeat CT-Thigh & Tib-fib: c/w worsening SSTI w/ increased size of fluid collections and early abscess development.  1. Interval development of 3 low-attenuation, rim enhancing complex fluid collections along posterior surface of semimembranosus(1.3 x 2.2 x 10.5 cm) , medial  gastrocnemius (1.2 x 4 x 13.1 cm ) and lateral gastrocnemius (1.5 x 2.6 x 0.2 cm), which is most concerning for early abscesses.    2. No CT findings to suggest superimposed gas, however, component of necrotizing fascitis cannot be excluded.    3.. Additional ill-defined low-attenuation fluid with surrounding inflammatory change at the level of the popliteal fossa, most concerning for phlegmonous change noting lack of clear organization and rim enhancement.   Recommendations/Plan:  1. Continue IV Dapto, Cefepime, with Clinda (added back yesterday 12/12 due to slight increased swelling/redness at calf)   2. Recommend consulting Gen surg and/or IR for further eval of interval development of fluid collections, and recs for intervention, if any (I.e. source control w/ surgical I&D, etc)  3. Recommend continuing judicious leg elevation - ankle>knee>hip  4.  Pending gen surg recs and pt hosp course, tentative outpt abx regimen once ready for d/c -- may consider PO-Doxycycline 100mg BID, Cefpodoxime 400mg Q12hrs, and Clindamycin 450mg Q6h -- for recommended total abx duration of 3wks at minimum.    -- Discussed with ID Staff and primary team.  -- ID will continue to follow          Thank you for your consult. I will follow-up with patient. Please contact us if you have any additional questions.    Herbert Villalobos PA-C  Infectious Disease  [facility] ([facility]    Subjective:     Principal Problem:Cellulitis of right lower extremity    HPI: 40 you male without significant PMHx pw RLE pain and swelling 12/6/22.  He had been tin [employer] [employer] [facility] [employer] last week and developed RLE pain/swelling and was seen by  there (last Fri) and given po prednisone and abx.  His leg pain worsened and he developed fever and chills all of which later improved though he continued with night sweats.  When he returned the leg worsened with pain redness and swelling.  He presented to the ED for eval 12/6.  He was admitted with cf  cellulitis.    On admit WBC was 26 and .  He was treated with vanc and rocephin then clinda/rocephin WBC improved then trended up to 14s.      MRI RLE done cf: regions of intramuscular edema, most pronounced in adductor deysi, biceps femoris, and gastrocnemius.  Intermuscular edema as well, most pronounced in the posterior thigh compartment.  There are curvilinear rim enhancing fluid collections along the posterior aspects of semimembranosus and medial gastrocnemius.     Gen Sx consulted abd followed x 2d but rec abx, no surgical intervention.  ID consulted for cellulitis.  Patient seen and still co of significant leg pain - 3 /10 at rest but 30/10 with walking.  Leg still swollen but now with progressive bruising over last 12 h in calf.  Reports leg previously weeping/blistering.  Denies injury to leg but possible bug bit behind knee.  Denies knee pain with movement.  No water animal/ water exposure  Remains on and tolerating clindamycin and ceftriaxone. Blood cultures NGTD.    Interval History: NAEON. Pt with some increased induration, swelling, and superficial hemorraghic skin changes w/ some redness at right calf. Otherwise, VSS, HDS. Remains afebrile >48hrs, and without leukocytosis, CRP (64) continued down trend; and ESR (89) slightly up from 80 on admit 12/06. Patient still having severe dependent leg pain 10/10 when leg hanging down / standing w/o WB' compared to 3/10 when at rest w/ elevation, comfortable/tolerable.   (12/13) Repeat CT-Thigh & Tib-fib: c/w worsening SSTI w/ increased size of fluid collections and early abscess development.  1. Interval development of 3 low-attenuation, rim enhancing complex fluid collections along posterior surface of semimembranosus(1.3 x 2.2 x 10.5 cm) , medial gastrocnemius (1.2 x 4 x 13.1 cm ) and lateral gastrocnemius (1.5 x 2.6 x 0.2 cm), which is most concerning for early abscesses.    2. No CT findings to suggest superimposed gas, however, component of  necrotizing fascitis cannot be excluded.    3. Additional ill-defined low-attenuation fluid with surrounding inflammatory change at the level of the popliteal fossa, most concerning for phlegmonous change noting lack of clear organization and rim enhancement.     Review of Systems   Constitutional:  Positive for appetite change. Negative for chills, diaphoresis, fatigue and fever.   HENT:  Negative for congestion, dental problem, sore throat and trouble swallowing.    Eyes:  Negative for pain and visual disturbance.   Respiratory:  Negative for cough and wheezing.    Cardiovascular:  Positive for leg swelling. Negative for chest pain and palpitations.   Gastrointestinal:  Negative for abdominal pain, constipation, diarrhea, nausea and vomiting.   Genitourinary:  Negative for dysuria, flank pain, hematuria and urgency.   Musculoskeletal:  Positive for arthralgias, joint swelling and myalgias. Negative for back pain, gait problem, neck pain and neck stiffness.   Skin:  Positive for color change, rash and wound.   Neurological:  Negative for seizures and headaches.   Psychiatric/Behavioral:  Negative for behavioral problems and confusion. The patient is not nervous/anxious.    Objective:     Vital Signs (Most Recent):  Temp: 98.2 °F (36.8 °C) (12/13/22 0724)  Pulse: 89 (12/13/22 0724)  Resp: 18 (12/13/22 0724)  BP: 110/63 (12/13/22 0724)  SpO2: (!) 90 % (12/13/22 0724)   Vital Signs (24h Range):  Temp:  [97.4 °F (36.3 °C)-98.9 °F (37.2 °C)] 98.2 °F (36.8 °C)  Pulse:  [82-96] 89  Resp:  [18-20] 18  SpO2:  [90 %-93 %] 90 %  BP: (110-118)/(58-69) 110/63     Weight: 102.1 kg (225 lb)  Body mass index is 33.23 kg/m².    Estimated Creatinine Clearance: 150.4 mL/min (based on SCr of 0.8 mg/dL).    Physical Exam  Constitutional:       General: He is not in acute distress.     Appearance: Normal appearance. He is not ill-appearing, toxic-appearing or diaphoretic.   HENT:      Head: Normocephalic and atraumatic.       Mouth/Throat:      Mouth: Mucous membranes are moist.      Pharynx: Oropharynx is clear.   Eyes:      General: No scleral icterus.     Conjunctiva/sclera: Conjunctivae normal.   Cardiovascular:      Rate and Rhythm: Normal rate and regular rhythm.      Pulses: Normal pulses.      Heart sounds: Normal heart sounds.   Pulmonary:      Effort: Pulmonary effort is normal.      Breath sounds: Normal breath sounds.   Abdominal:      General: Bowel sounds are normal. There is no distension.      Palpations: Abdomen is soft.      Tenderness: There is no abdominal tenderness.   Musculoskeletal:         General: Swelling and tenderness present.      Cervical back: Normal range of motion. No rigidity or tenderness.      Right lower leg: Edema present.      Left lower leg: No edema.   Lymphadenopathy:      Cervical: No cervical adenopathy.   Skin:     General: Skin is warm and dry.      Capillary Refill: Capillary refill takes less than 2 seconds.      Findings: Bruising and erythema present.      Comments: Induration noted at posterior compartments of right thigh and calf. Still with severe dependent Leg pain (10/10) mostly distal to knee, at calf; noting pain exacerbated when leg hanging down vertical w/ gravity. At rest with leg elevated, pain 3/10, comfortable/ tolerable.    Neurological:      Mental Status: He is alert and oriented to person, place, and time. Mental status is at baseline.   Psychiatric:         Mood and Affect: Mood normal.         Behavior: Behavior normal.         Thought Content: Thought content normal.         Judgment: Judgment normal.           Significant Labs: All pertinent labs within the past 24 hours have been reviewed.    Significant Imaging: I have reviewed all pertinent imaging results/findings within the past 24 hours.

## 2022-12-13 NOTE — PROGRESS NOTES
[facility] ([place] [address])  Infectious Disease  Progress Note    Patient Name: Jim Waddell  MRN: [id number]  Admission Date: 12/6/2022  Length of Stay: 4 days  Attending Physician: Dilan Valle MD  Primary Care Provider: Primary Doctor No    Isolation Status: No active isolations  Assessment/Plan:      * Cellulitis of right lower extremity  See Pyomyositis for A/P.    Pyomyositis  38 yo male with no significant PMHX pw RLE pain and cellulitis with cf deep infection (pyomyositis / fascitis) progressed on clindamycin and rocephin with MRI showing abscess and possible myositis/ fascial involvement. Per dw radiology abscess measures gastroc is 9.5 x 6.6 x 0.5 cm,  semimembranosus is 6.5 x 4.0 x 1.2 cm and both very thin. Blood cxs NG. Gen sx consulted for urgent eval due to concerns for NSTI; obtained LLE CT which was stable. U/S w/o evidence cf NSTI. Gen surg rec no surgical intervention, favoring medical management for SSTI, with low level concern for NSTI.    on admit.  -- D/c'd ceftriaxone 12/09; changed to Daptomycin, cefepime and clindamycin; with clinda d/c'd after approx. 5d on 12/11.   -- Patient continuing on IV-Dapto and Cefepime. Overall, pt improving; remains stable, non-septic, leukocytosis resolved, afebrile >48hrs. CRP improved (124), down from 280 on admit. Cellulitis component nearly resolved; but still present on exam includes: induration, swelling, and increased warmth at posterior muscle compartments of thigh and calf; as well as severe dependent leg pain (10/10) when leg hanging down or even standing w/o weight bearing. In addition, today pt reports increased swelling and redness at affected right calf compared to yesterday, which may be more edema related; rather than infectious etiology. However, in context of low level concern for NSTI and with reported change in sxs <24hrs after discontinuing Clinda,-- would currently recommend adding Clinda back to empiric abx.       Recommendations/Plan:  1. Continue IV Dapto and Cefepime  2. Recommend adding back Clindamycin to empiric abx for reasons mentioned above.  3. Recommend repeat CT w/ contrast imaging of right thigh and tib-fib, for further eval of fluid collections at posterior thigh and calf.  4. If repeat imaging findings do not meet criteria for drain / source control, then recommend transition to oral abx prior to d/c, with tentative outpt abx regimen: PO-Doxycycline 100mg BID, Cefpodoxime 400mg Q12hrs, and Clindamycin 450mg Q6h -- for recommended duration of 2-3wks s/p start date of oral abx regimen.  5. Recommend continuing judicious leg elevation - ankle>knee>hip  6. Recommend pain management and possibly PT for care needs related to mobility and severe dependent leg pain when leg hanging down.  6. Discussed with ID Staff -- will re-discuss tomorrow duration of therapy 2wks vs. 3wks for SSTI w/ pyomyositis/fascitis.   -- ID final recs pending further staff discussion and repeat imaging.   -- ID will continue to follow          Thank you for your consult. I will follow-up with patient. Please contact us if you have any additional questions.    Herbert Villalobos PA-C  Infectious Disease  [facility]    Subjective:     Principal Problem:Cellulitis of right lower extremity    HPI: 40 you male without significant PMHx pw RLE pain and swelling 12/6/22.  He had been tin [facility] last week and developed RLE pain/swelling and was seen by UC there (last Fri) and given po prednisone and abx.  His leg pain worsened and he developed fever and chills all of which later improved though he continued with night sweats.  When he returned the leg worsened with pain redness and swelling.  He presented to the ED for eval 12/6.  He was admitted with cf cellulitis.    On admit WBC was 26 and .  He was treated with vanc and rocephin then clinda/rocephin WBC improved then trended up to 14s.      MRI RLE  done cf: regions of intramuscular edema, most pronounced in adductor deysi, biceps femoris, and gastrocnemius.  Intermuscular edema as well, most pronounced in the posterior thigh compartment.  There are curvilinear rim enhancing fluid collections along the posterior aspects of semimembranosus and medial gastrocnemius.     Gen Sx consulted abd followed x 2d but rec abx, no surgical intervention.  ID consulted for cellulitis.  Patient seen and still co of significant leg pain - 3 /10 at rest but 30/10 with walking.  Leg still swollen but now with progressive bruising over last 12 h in calf.  Reports leg previously weeping/blistering.  Denies injury to leg but possible bug bit behind knee.  Denies knee pain with movement.  No water animal/ water exposure  Remains on and tolerating clindamycin and ceftriaxone. Blood cultures NGTD.    Interval History: Some increased swelling and redness at right calf, relative to yesterday. Otherwise, VSS, HDS. Remains afebrile >48hrs, and without leukocytosis, CRP (64) continued down trend; and ESR (89) slightly up from 80 on admit 12/06. Patient still having severe dependent leg pain 10/10 when leg hanging down / standing w/o WB; at rest w/ elevation 3/10, comfortable/tolerable.   -- awaiting repeat CT of thigh and tib-fib for re-eval of fluid collections.       Review of Systems   Constitutional:  Positive for appetite change. Negative for chills, diaphoresis, fatigue and fever.   HENT:  Negative for congestion, dental problem, sore throat and trouble swallowing.    Eyes:  Negative for pain and visual disturbance.   Respiratory:  Negative for cough and wheezing.    Cardiovascular:  Positive for leg swelling. Negative for chest pain and palpitations.   Gastrointestinal:  Negative for abdominal pain, constipation, diarrhea, nausea and vomiting.   Genitourinary:  Negative for dysuria, flank pain, hematuria and urgency.   Musculoskeletal:  Positive for arthralgias, joint swelling and  myalgias. Negative for back pain, gait problem, neck pain and neck stiffness.   Skin:  Positive for color change, rash and wound.   Neurological:  Negative for seizures and headaches.   Psychiatric/Behavioral:  Negative for behavioral problems and confusion. The patient is not nervous/anxious.    Objective:     Vital Signs (Most Recent):  Temp: 97.4 °F (36.3 °C) (12/12/22 1512)  Pulse: 93 (12/12/22 1512)  Resp: 18 (12/12/22 1610)  BP: 116/69 (12/12/22 1512)  SpO2: (!) 92 % (12/12/22 1512)   Vital Signs (24h Range):  Temp:  [97.4 °F (36.3 °C)-98.6 °F (37 °C)] 97.4 °F (36.3 °C)  Pulse:  [82-93] 93  Resp:  [16-21] 18  SpO2:  [92 %-95 %] 92 %  BP: (111-123)/(65-77) 116/69     Weight: 102.1 kg (225 lb)  Body mass index is 33.23 kg/m².    Estimated Creatinine Clearance: 133.7 mL/min (based on SCr of 0.9 mg/dL).    Physical Exam  Constitutional:       General: He is not in acute distress.     Appearance: Normal appearance. He is not ill-appearing, toxic-appearing or diaphoretic.   HENT:      Head: Normocephalic and atraumatic.      Mouth/Throat:      Mouth: Mucous membranes are moist.      Pharynx: Oropharynx is clear.   Eyes:      General: No scleral icterus.     Conjunctiva/sclera: Conjunctivae normal.   Cardiovascular:      Rate and Rhythm: Normal rate and regular rhythm.      Pulses: Normal pulses.      Heart sounds: Normal heart sounds.   Pulmonary:      Effort: Pulmonary effort is normal.      Breath sounds: Normal breath sounds.   Abdominal:      General: Bowel sounds are normal. There is no distension.      Palpations: Abdomen is soft.      Tenderness: There is no abdominal tenderness.   Musculoskeletal:         General: Swelling and tenderness present.      Cervical back: Normal range of motion. No rigidity or tenderness.      Right lower leg: Edema present.      Left lower leg: No edema.   Lymphadenopathy:      Cervical: No cervical adenopathy.   Skin:     General: Skin is warm and dry.      Capillary Refill:  Capillary refill takes less than 2 seconds.      Findings: Bruising and erythema present.      Comments: Induration noted at posterior compartments of right thigh and calf. Still with severe dependent Leg pain (10/10) mostly distal to knee, at calf; noting pain exacerbated when leg hanging down vertical w/ gravity. At rest with leg elevated, pain 3/10, comfortable/ tolerable.    Neurological:      Mental Status: He is alert and oriented to person, place, and time. Mental status is at baseline.   Psychiatric:         Mood and Affect: Mood normal.         Behavior: Behavior normal.         Thought Content: Thought content normal.         Judgment: Judgment normal.           Significant Labs: All pertinent labs within the past 24 hours have been reviewed.    Significant Imaging: I have reviewed all pertinent imaging results/findings within the past 24 hours.

## 2022-12-13 NOTE — CARE UPDATE
General Surgery Update    CT scan performed of leg with evidence of fluid collections. Patient examined, states that he is feeling better. Erythema and tenderness improving. Fluid collections not palpable. On imaging, collections are seen, but are deep and intramuscular.     Believe that with current management therapy, he is progressing and that operative intervention would prolong his course. Recommend continuing antibiotics and conservative management.     Timbo Haskins MD  [facility] PGY3

## 2022-12-13 NOTE — ASSESSMENT & PLAN NOTE
SSTI w/ pyomyositis/fascitis [with low level suspicion for NSTI]--  38 yo male with no significant PMHX pw RLE pain and cellulitis with cf deep infection (pyomyositis / fascitis) progressed on clindamycin and rocephin with MRI showing abscess and possible myositis/ fascial involvement. Per dw radiology abscess measures gastroc is 9.5 x 6.6 x 0.5 cm,  semimembranosus is 6.5 x 4.0 x 1.2 cm and both very thin. Blood cxs NG.  on admit. Gen sx consulted for urgent eval due to concerns for NSTI; obtained LLE CT which was stable. U/S w/o evidence cf NSTI. Gen surg rec no surgical intervention, favoring medical management for SSTI, with low level concern for NSTI.   -- D/c'd ceftriaxone 12/09; changed to Daptomycin, cefepime and clindamycin; [with clinda d/c'd after approx. 5d on 12/11].  -- Patient continuing on IV-Dapto and Cefepime. Overall, pt improving; remains stable, non-septic, leukocytosis resolved, afebrile >48hrs. CRP continuing to improve (64), down from 280 on admit. Cellulitis component nearly resolved; but there remains induration, swelling, and increased warmth at posterior muscle compartments of thigh and calf; as well as severe dependent leg pain (10/10) when leg hanging down or even standing w/o weight bearing.   (12/13) Repeat CT-Thigh & Tib-fib: c/w worsening SSTI w/ increased size of fluid collections and early abscess development.  1. Interval development of 3 low-attenuation, rim enhancing complex fluid collections along posterior surface of semimembranosus(1.3 x 2.2 x 10.5 cm) , medial gastrocnemius (1.2 x 4 x 13.1 cm ) and lateral gastrocnemius (1.5 x 2.6 x 0.2 cm), which is most concerning for early abscesses.    2. No CT findings to suggest superimposed gas, however, component of necrotizing fascitis cannot be excluded.    3.. Additional ill-defined low-attenuation fluid with surrounding inflammatory change at the level of the popliteal fossa, most concerning for phlegmonous change noting  lack of clear organization and rim enhancement.   Recommendations/Plan:  1. Continue IV Dapto, Cefepime, with Clinda (added back yesterday 12/12 due to slight increased swelling/redness at calf)   2. Recommend consulting Gen surg and/or IR for further eval of interval development of fluid collections, and recs for intervention, if any (I.e. source control w/ surgical I&D, etc)  3. Recommend continuing judicious leg elevation - ankle>knee>hip  4.  Pending gen surg recs and pt hosp course, tentative outpt abx regimen once ready for d/c -- may consider PO-Doxycycline 100mg BID, Cefpodoxime 400mg Q12hrs, and Clindamycin 450mg Q6h -- for recommended total abx duration of 3wks at minimum.    -- Discussed with ID Staff and primary team.  -- ID will continue to follow

## 2022-12-14 LAB
ALBUMIN SERPL BCP-MCNC: 2.8 G/DL (ref 3.5–5.2)
ALP SERPL-CCNC: 76 U/L (ref 55–135)
ALT SERPL W/O P-5'-P-CCNC: 88 U/L (ref 10–44)
ANION GAP SERPL CALC-SCNC: 8 MMOL/L (ref 8–16)
AST SERPL-CCNC: 52 U/L (ref 10–40)
BASOPHILS # BLD AUTO: 0.06 K/UL (ref 0–0.2)
BASOPHILS NFR BLD: 0.7 % (ref 0–1.9)
BILIRUB SERPL-MCNC: 0.8 MG/DL (ref 0.1–1)
BUN SERPL-MCNC: 15 MG/DL (ref 6–20)
CALCIUM SERPL-MCNC: 9.8 MG/DL (ref 8.7–10.5)
CHLORIDE SERPL-SCNC: 99 MMOL/L (ref 95–110)
CO2 SERPL-SCNC: 25 MMOL/L (ref 23–29)
CREAT SERPL-MCNC: 1.1 MG/DL (ref 0.5–1.4)
DIFFERENTIAL METHOD: ABNORMAL
EOSINOPHIL # BLD AUTO: 0.2 K/UL (ref 0–0.5)
EOSINOPHIL NFR BLD: 2.4 % (ref 0–8)
ERYTHROCYTE [DISTWIDTH] IN BLOOD BY AUTOMATED COUNT: 11.7 % (ref 11.5–14.5)
EST. GFR  (NO RACE VARIABLE): >60 ML/MIN/1.73 M^2
GLUCOSE SERPL-MCNC: 92 MG/DL (ref 70–110)
HCT VFR BLD AUTO: 40.8 % (ref 40–54)
HGB BLD-MCNC: 13.6 G/DL (ref 14–18)
IMM GRANULOCYTES # BLD AUTO: 0.09 K/UL (ref 0–0.04)
IMM GRANULOCYTES NFR BLD AUTO: 1.1 % (ref 0–0.5)
LYMPHOCYTES # BLD AUTO: 2.4 K/UL (ref 1–4.8)
LYMPHOCYTES NFR BLD: 28.5 % (ref 18–48)
MAGNESIUM SERPL-MCNC: 2.1 MG/DL (ref 1.6–2.6)
MCH RBC QN AUTO: 30.9 PG (ref 27–31)
MCHC RBC AUTO-ENTMCNC: 33.3 G/DL (ref 32–36)
MCV RBC AUTO: 93 FL (ref 82–98)
MONOCYTES # BLD AUTO: 0.7 K/UL (ref 0.3–1)
MONOCYTES NFR BLD: 7.8 % (ref 4–15)
NEUTROPHILS # BLD AUTO: 5 K/UL (ref 1.8–7.7)
NEUTROPHILS NFR BLD: 59.5 % (ref 38–73)
NRBC BLD-RTO: 0 /100 WBC
PHOSPHATE SERPL-MCNC: 4.1 MG/DL (ref 2.7–4.5)
PLATELET # BLD AUTO: 422 K/UL (ref 150–450)
PMV BLD AUTO: 10 FL (ref 9.2–12.9)
POTASSIUM SERPL-SCNC: 4.5 MMOL/L (ref 3.5–5.1)
PROT SERPL-MCNC: 8.7 G/DL (ref 6–8.4)
RBC # BLD AUTO: 4.4 M/UL (ref 4.6–6.2)
SODIUM SERPL-SCNC: 132 MMOL/L (ref 136–145)
WBC # BLD AUTO: 8.35 K/UL (ref 3.9–12.7)

## 2022-12-14 PROCEDURE — 63600175 PHARM REV CODE 636 W HCPCS: Performed by: FAMILY MEDICINE

## 2022-12-14 PROCEDURE — 99233 PR SUBSEQUENT HOSPITAL CARE,LEVL III: ICD-10-PCS | Mod: ,,, | Performed by: STUDENT IN AN ORGANIZED HEALTH CARE EDUCATION/TRAINING PROGRAM

## 2022-12-14 PROCEDURE — 99233 SBSQ HOSP IP/OBS HIGH 50: CPT | Mod: ,,, | Performed by: STUDENT IN AN ORGANIZED HEALTH CARE EDUCATION/TRAINING PROGRAM

## 2022-12-14 PROCEDURE — 63600175 PHARM REV CODE 636 W HCPCS: Performed by: PHYSICIAN ASSISTANT

## 2022-12-14 PROCEDURE — 25000003 PHARM REV CODE 250: Performed by: PHYSICIAN ASSISTANT

## 2022-12-14 PROCEDURE — 84100 ASSAY OF PHOSPHORUS: CPT | Performed by: INTERNAL MEDICINE

## 2022-12-14 PROCEDURE — 99900035 HC TECH TIME PER 15 MIN (STAT)

## 2022-12-14 PROCEDURE — 36415 COLL VENOUS BLD VENIPUNCTURE: CPT | Performed by: INTERNAL MEDICINE

## 2022-12-14 PROCEDURE — 99233 PR SUBSEQUENT HOSPITAL CARE,LEVL III: ICD-10-PCS | Mod: ,,, | Performed by: INTERNAL MEDICINE

## 2022-12-14 PROCEDURE — 25000003 PHARM REV CODE 250: Performed by: INTERNAL MEDICINE

## 2022-12-14 PROCEDURE — 83735 ASSAY OF MAGNESIUM: CPT | Performed by: INTERNAL MEDICINE

## 2022-12-14 PROCEDURE — 12000002 HC ACUTE/MED SURGE SEMI-PRIVATE ROOM

## 2022-12-14 PROCEDURE — 99233 SBSQ HOSP IP/OBS HIGH 50: CPT | Mod: ,,, | Performed by: INTERNAL MEDICINE

## 2022-12-14 PROCEDURE — 85025 COMPLETE CBC W/AUTO DIFF WBC: CPT | Performed by: INTERNAL MEDICINE

## 2022-12-14 PROCEDURE — 94761 N-INVAS EAR/PLS OXIMETRY MLT: CPT

## 2022-12-14 PROCEDURE — 80053 COMPREHEN METABOLIC PANEL: CPT | Performed by: INTERNAL MEDICINE

## 2022-12-14 RX ORDER — DOXYCYCLINE HYCLATE 100 MG
100 TABLET ORAL EVERY 12 HOURS
Status: DISCONTINUED | OUTPATIENT
Start: 2022-12-14 | End: 2022-12-15 | Stop reason: HOSPADM

## 2022-12-14 RX ORDER — CLINDAMYCIN HYDROCHLORIDE 150 MG/1
450 CAPSULE ORAL EVERY 8 HOURS
Status: DISCONTINUED | OUTPATIENT
Start: 2022-12-14 | End: 2022-12-15 | Stop reason: HOSPADM

## 2022-12-14 RX ORDER — CEFPODOXIME PROXETIL 200 MG/1
400 TABLET, FILM COATED ORAL EVERY 12 HOURS
Status: DISCONTINUED | OUTPATIENT
Start: 2022-12-14 | End: 2022-12-15 | Stop reason: HOSPADM

## 2022-12-14 RX ADMIN — CEFEPIME 2 G: 2 INJECTION, POWDER, FOR SOLUTION INTRAVENOUS at 06:12

## 2022-12-14 RX ADMIN — TACROLIMUS 900 MG: 0.5 CAPSULE ORAL at 05:12

## 2022-12-14 RX ADMIN — OXYCODONE HYDROCHLORIDE 15 MG: 10 TABLET ORAL at 09:12

## 2022-12-14 RX ADMIN — DAPTOMYCIN 815 MG: 350 INJECTION, POWDER, LYOPHILIZED, FOR SOLUTION INTRAVENOUS at 03:12

## 2022-12-14 RX ADMIN — OXYCODONE HYDROCHLORIDE 15 MG: 10 TABLET ORAL at 03:12

## 2022-12-14 RX ADMIN — TACROLIMUS 900 MG: 0.5 CAPSULE ORAL at 01:12

## 2022-12-14 RX ADMIN — OXYCODONE HYDROCHLORIDE 15 MG: 10 TABLET ORAL at 01:12

## 2022-12-14 RX ADMIN — CEFPODOXIME PROXETIL 400 MG: 200 TABLET, FILM COATED ORAL at 09:12

## 2022-12-14 RX ADMIN — CLINDAMYCIN HYDROCHLORIDE 450 MG: 150 CAPSULE ORAL at 09:12

## 2022-12-14 RX ADMIN — DOXYCYCLINE HYCLATE 100 MG: 100 TABLET, COATED ORAL at 09:12

## 2022-12-14 RX ADMIN — ENOXAPARIN SODIUM 40 MG: 40 INJECTION SUBCUTANEOUS at 05:12

## 2022-12-14 RX ADMIN — CEFEPIME 2 G: 2 INJECTION, POWDER, FOR SOLUTION INTRAVENOUS at 01:12

## 2022-12-14 RX ADMIN — CEFEPIME 2 G: 2 INJECTION, POWDER, FOR SOLUTION INTRAVENOUS at 09:12

## 2022-12-14 NOTE — PLAN OF CARE
Problem: Adult Inpatient Plan of Care  Goal: Plan of Care Review  Outcome: Ongoing, Progressing  Goal: Patient-Specific Goal (Individualized)  Outcome: Ongoing, Progressing   Pt AAO X 4; able to express needs.  C/o pain .  Cellulitis RLE. Leg elevated on pillows.   IV ABX and pain meds given as ordered.  Safety maintained.  Bed in low position,  call  light in reach.

## 2022-12-14 NOTE — PROGRESS NOTES
[facility] Medicine  Daily progress note    Patient Name: Jim Waddell  MRN: [id number]  Patient Class: IP- Inpatient  Admission Date: 12/6/2022  Attending Physician: Dilan Valle MD   Primary Care Provider: Primary Doctor No      Patient information was obtained from patient, past medical records and ER records.     Subjective:     Principal Problem:Cellulitis of right lower extremity    Chief Complaint:   Chief Complaint   Patient presents with    Leg Pain     Pt c/o left lower leg pain and swelling.  Reports redness behind knee and then blistering to calf. States he has been walking around a lot at [place] past few days.         HPI: This is a 36yo male with no reported past medical history who presents to the ED with chief complaint of RLE pain and swelling. Patient states that one week ago he was having URI symptoms and was treated with azithromycin and steroid injection at Urgent care. Later same week was still not feeling well and went to [facility] Room in [place] and states he got another steroid shot and also vitamin infusion. Reports feeling well the next couple days and went to [place]. On Friday patient had new onset fevers with high temp 102.8. Went to  in [place] on 12/3 and got levaquin and prednisone.  Next day patient had new onset lower calf pain and since that time pain has been progressively worsening with associated RLE swelling, erythema, warmth, tenderness. Denies numbness, tingling, nausea, vomiting, chest pain, shortness of breath, weakness, abdominal pain or confusion.     In the ED patient afebrile and hemodynamically stable saturating well on room air. WBC 26.48, LA 1.0. US RLE negative for DVT and Xray negative for acute fracture. Cellulitis skin changes present from low calf to mid medial thigh. Full ROM of all extremities and knee. ESR and CRP significantly elevated. Patient started on iv abx and admitted to the care of medicine for further evaluation and  management.      37-year-old male without significant past medical history presents with right lower extremity redness and tenderness consistent with cellulitis.  Imaging suggested the presence of a small fluid collection.  General surgery has been consulted and at this point does not feel that there is a drainable collection but will continue to follow.      On admission the patient was started on vancomycin and ceftriaxone, then transition to ceftriaxone and clindamycin. ID consulted as cellulitis remained severe.         Subjective:  NAEON. No chest pain, shortness of breath, lightheadedness.  Pain improving compared to admission but still present. No purulent drainage.     NAD, AO3  NC, AT  RRR  CTAB  SNTND+BS  Right lower extremity with less swelling and dull/ dark erythema now. Leg is still TTT. Improving.     Vitals, labs and radiographs from past 24h reviewed and personally interpreted.     Assessment/Plan:     * Cellulitis of right lower extremity  - general surgery was following and no surgical intervention warranted.   - ID consulted 12/8-- abx upgraded. Case re discussed with gen surg/ HM and ID.currently on ( clinda, cefepime and dapto)  - gen surg re eval-- CT : no evidence of subcu emphysema    -re-image per ID rec; gen surg and ID contacted regarding results  -resume clinda per ID rec      Leukocytosis  - 2/2 acute infection.     VTE Risk Mitigation (From admission, onward)           Ordered     enoxaparin injection 40 mg  Daily         12/06/22 1758     IP VTE HIGH RISK PATIENT  Once         12/06/22 1758     Place sequential compression device  Until discontinued         12/06/22 1758     Place sequential compression device  Until discontinued         12/06/22 1745                    Dilan Valle MD  Department of Hospital Medicine   [facility] - Emergency Dept

## 2022-12-15 VITALS
OXYGEN SATURATION: 94 % | SYSTOLIC BLOOD PRESSURE: 109 MMHG | RESPIRATION RATE: 19 BRPM | TEMPERATURE: 98 F | HEART RATE: 90 BPM | HEIGHT: 69 IN | DIASTOLIC BLOOD PRESSURE: 64 MMHG | WEIGHT: 225 LBS | BODY MASS INDEX: 33.33 KG/M2

## 2022-12-15 DIAGNOSIS — Z88.0 HX OF PENICILLIN ALLERGY: Primary | ICD-10-CM

## 2022-12-15 LAB
ALBUMIN SERPL BCP-MCNC: 2.9 G/DL (ref 3.5–5.2)
ALP SERPL-CCNC: 74 U/L (ref 55–135)
ALT SERPL W/O P-5'-P-CCNC: 75 U/L (ref 10–44)
ANION GAP SERPL CALC-SCNC: 8 MMOL/L (ref 8–16)
AST SERPL-CCNC: 39 U/L (ref 10–40)
BASOPHILS # BLD AUTO: 0.06 K/UL (ref 0–0.2)
BASOPHILS NFR BLD: 0.7 % (ref 0–1.9)
BILIRUB SERPL-MCNC: 0.8 MG/DL (ref 0.1–1)
BUN SERPL-MCNC: 18 MG/DL (ref 6–20)
CALCIUM SERPL-MCNC: 9.7 MG/DL (ref 8.7–10.5)
CHLORIDE SERPL-SCNC: 99 MMOL/L (ref 95–110)
CO2 SERPL-SCNC: 25 MMOL/L (ref 23–29)
CREAT SERPL-MCNC: 1 MG/DL (ref 0.5–1.4)
CRP SERPL-MCNC: 38.6 MG/L (ref 0–8.2)
DIFFERENTIAL METHOD: ABNORMAL
EOSINOPHIL # BLD AUTO: 0.2 K/UL (ref 0–0.5)
EOSINOPHIL NFR BLD: 2 % (ref 0–8)
ERYTHROCYTE [DISTWIDTH] IN BLOOD BY AUTOMATED COUNT: 11.5 % (ref 11.5–14.5)
EST. GFR  (NO RACE VARIABLE): >60 ML/MIN/1.73 M^2
GLUCOSE SERPL-MCNC: 85 MG/DL (ref 70–110)
HCT VFR BLD AUTO: 40.9 % (ref 40–54)
HGB BLD-MCNC: 13.4 G/DL (ref 14–18)
IMM GRANULOCYTES # BLD AUTO: 0.07 K/UL (ref 0–0.04)
IMM GRANULOCYTES NFR BLD AUTO: 0.8 % (ref 0–0.5)
LYMPHOCYTES # BLD AUTO: 2.6 K/UL (ref 1–4.8)
LYMPHOCYTES NFR BLD: 30.7 % (ref 18–48)
MAGNESIUM SERPL-MCNC: 2.1 MG/DL (ref 1.6–2.6)
MCH RBC QN AUTO: 30.2 PG (ref 27–31)
MCHC RBC AUTO-ENTMCNC: 32.8 G/DL (ref 32–36)
MCV RBC AUTO: 92 FL (ref 82–98)
MONOCYTES # BLD AUTO: 0.8 K/UL (ref 0.3–1)
MONOCYTES NFR BLD: 9.4 % (ref 4–15)
NEUTROPHILS # BLD AUTO: 4.7 K/UL (ref 1.8–7.7)
NEUTROPHILS NFR BLD: 56.4 % (ref 38–73)
NRBC BLD-RTO: 0 /100 WBC
PLATELET # BLD AUTO: 472 K/UL (ref 150–450)
PMV BLD AUTO: 10.4 FL (ref 9.2–12.9)
POTASSIUM SERPL-SCNC: 4.4 MMOL/L (ref 3.5–5.1)
PROT SERPL-MCNC: 8.5 G/DL (ref 6–8.4)
RBC # BLD AUTO: 4.44 M/UL (ref 4.6–6.2)
SODIUM SERPL-SCNC: 132 MMOL/L (ref 136–145)
WBC # BLD AUTO: 8.37 K/UL (ref 3.9–12.7)

## 2022-12-15 PROCEDURE — 25000003 PHARM REV CODE 250: Performed by: INTERNAL MEDICINE

## 2022-12-15 PROCEDURE — 99233 PR SUBSEQUENT HOSPITAL CARE,LEVL III: ICD-10-PCS | Mod: ,,, | Performed by: STUDENT IN AN ORGANIZED HEALTH CARE EDUCATION/TRAINING PROGRAM

## 2022-12-15 PROCEDURE — 99239 HOSP IP/OBS DSCHRG MGMT >30: CPT | Mod: ,,, | Performed by: INTERNAL MEDICINE

## 2022-12-15 PROCEDURE — 36415 COLL VENOUS BLD VENIPUNCTURE: CPT | Performed by: INTERNAL MEDICINE

## 2022-12-15 PROCEDURE — 86140 C-REACTIVE PROTEIN: CPT | Performed by: INTERNAL MEDICINE

## 2022-12-15 PROCEDURE — 99233 SBSQ HOSP IP/OBS HIGH 50: CPT | Mod: ,,, | Performed by: STUDENT IN AN ORGANIZED HEALTH CARE EDUCATION/TRAINING PROGRAM

## 2022-12-15 PROCEDURE — 80053 COMPREHEN METABOLIC PANEL: CPT | Performed by: INTERNAL MEDICINE

## 2022-12-15 PROCEDURE — 85025 COMPLETE CBC W/AUTO DIFF WBC: CPT | Performed by: INTERNAL MEDICINE

## 2022-12-15 PROCEDURE — 99239 PR HOSPITAL DISCHARGE DAY,>30 MIN: ICD-10-PCS | Mod: ,,, | Performed by: INTERNAL MEDICINE

## 2022-12-15 PROCEDURE — 83735 ASSAY OF MAGNESIUM: CPT | Performed by: INTERNAL MEDICINE

## 2022-12-15 RX ORDER — DOXYCYCLINE HYCLATE 100 MG
100 TABLET ORAL EVERY 12 HOURS
Qty: 42 TABLET | Refills: 0 | Status: SHIPPED | OUTPATIENT
Start: 2022-12-15 | End: 2023-01-05

## 2022-12-15 RX ORDER — FLUOCINONIDE 0.5 MG/G
CREAM TOPICAL
Start: 2022-12-15 | End: 2023-12-14

## 2022-12-15 RX ORDER — OXYCODONE HYDROCHLORIDE 10 MG/1
10 TABLET ORAL EVERY 6 HOURS PRN
Qty: 20 TABLET | Refills: 0 | Status: SHIPPED | OUTPATIENT
Start: 2022-12-15 | End: 2023-01-17

## 2022-12-15 RX ORDER — CEFPODOXIME PROXETIL 200 MG/1
400 TABLET, FILM COATED ORAL EVERY 12 HOURS
Qty: 84 TABLET | Refills: 0 | Status: SHIPPED | OUTPATIENT
Start: 2022-12-15 | End: 2023-01-06 | Stop reason: SDUPTHER

## 2022-12-15 RX ORDER — CLINDAMYCIN HYDROCHLORIDE 150 MG/1
450 CAPSULE ORAL EVERY 8 HOURS
Qty: 189 CAPSULE | Refills: 0 | Status: SHIPPED | OUTPATIENT
Start: 2022-12-15 | End: 2023-01-06 | Stop reason: SDUPTHER

## 2022-12-15 RX ADMIN — OXYCODONE HYDROCHLORIDE 15 MG: 10 TABLET ORAL at 04:12

## 2022-12-15 RX ADMIN — CEFPODOXIME PROXETIL 400 MG: 200 TABLET, FILM COATED ORAL at 08:12

## 2022-12-15 RX ADMIN — CLINDAMYCIN HYDROCHLORIDE 450 MG: 150 CAPSULE ORAL at 06:12

## 2022-12-15 RX ADMIN — CLINDAMYCIN HYDROCHLORIDE 450 MG: 150 CAPSULE ORAL at 02:12

## 2022-12-15 RX ADMIN — DOXYCYCLINE HYCLATE 100 MG: 100 TABLET, COATED ORAL at 08:12

## 2022-12-15 NOTE — PLAN OF CARE
[facility] - Intensive Care ([facility]-)  Discharge Final Note    Primary Care Provider: Primary Doctor No  Expected Discharge Date: 12/15/2022  Patient medically ready for discharge to home today.  Hospital follow up scheduled, 12/19 with PCP. 1/11 with ID (soonest available. Pt on waitlist for sooner appointment).  Ambulatory referral was placed for outpatient PT/OT.     Final Discharge Note (most recent)       Final Note - 12/15/22 1625          Final Note    Assessment Type Final Discharge Note     Anticipated Discharge Disposition Home or Self Care     Hospital Resources/Appts/Education Provided Provided patient/caregiver with written discharge plan information;Appointments scheduled and added to AVS;Provided education on problems/symptoms using teachback        Post-Acute Status    Post-Acute Authorization Other     Other Status Awaiting f/u Appts     Discharge Delays None known at this time                   Important Message from Medicare         Referral Info (most recent)       Referral Info    No documentation.                 Contact Info       Outpatient PT/OT    [place] [address]       Next Steps: Follow up    Instructions: Please call [facility] Outpatient PT/OT/Speech therapy at [phone] to schedule          Future Appointments   Date Time Provider Department Center   12/19/2022 11:00 AM Mauricio Lomeli MD [facility]   1/11/2023  8:30 AM Dona Ponce MD [facility] ID [facility]  Case Management  Ext: [phone]  12/15/2022

## 2022-12-15 NOTE — NURSING
No significant changes notes. Bed locked and in lowest position. Call light in reach. Iv abx given. Oxy given.

## 2022-12-15 NOTE — PROGRESS NOTES
[facility] - Intensive Care ([place] [address])  Infectious Disease  Progress Note    Patient Name: Jim Waddell  MRN: [id number]  Admission Date: 12/6/2022  Length of Stay: 6 days  Attending Physician: Dilan Valle MD  Primary Care Provider: Primary Doctor No    Isolation Status: No active isolations  Assessment/Plan:      * Cellulitis of right lower extremity  See Pyomyositis for A/P.    Pyomyositis  SSTI w/ pyomyositis/fascitis [with low level suspicion for NSTI]--  36 yo male with no significant PMHX pw RLE pain and cellulitis with cf deep infection (pyomyositis / fascitis) progressed on clindamycin and rocephin with MRI showing abscess and possible myositis/ fascial involvement. Per dw radiology abscess measures gastroc is 9.5 x 6.6 x 0.5 cm,  semimembranosus is 6.5 x 4.0 x 1.2 cm and both very thin. Blood cxs NG.  on admit. Gen sx consulted for urgent eval due to concerns for NSTI; obtained LLE CT which was stable. U/S w/o evidence cf NSTI. Gen surg rec no surgical intervention, favoring medical management for SSTI, with low level concern for NSTI.   -- D/c'd ceftriaxone 12/09; changed to Daptomycin, cefepime and clindamycin; [with clinda d/c'd after approx. 5d on 12/11].  -- Overall, pt improving; remains stable, non-septic, leukocytosis resolved, afebrile >48hrs. CRP continuing to improve (64), down from 280 on admit.   There remains induration posterior aspect of calf and medial thigh, w/ swelling, some warmth, and superficial hemorrhagic skin changes, but no weeping wounds, skin intact. Sxs continuing to improve, and overlying skin changes representative of healing process. Also, pt still with dependent leg pain when leg hanging down w/ gravity, but has become more tolerable, and pt able to use crutches for ADLs.    (12/13) Repeat CT-Thigh & Tib-fib: c/f worsening SSTI w/ increased size of fluid collections and early abscess development.  1. Interval development of 3 low-attenuation, rim  enhancing complex fluid collections along posterior surface of semimembranosus(1.3 x 2.2 x 10.5 cm) , medial gastrocnemius (1.2 x 4 x 13.1 cm ) and lateral gastrocnemius (1.5 x 2.6 x 0.2 cm), which is most concerning for early abscesses.    2. No CT findings to suggest superimposed gas, however, component of necrotizing fascitis cannot be excluded.    3.. Additional ill-defined low-attenuation fluid with surrounding inflammatory change at the level of the popliteal fossa, most concerning for phlegmonous change noting lack of clear organization and rim enhancement.   -- Per d/w primary team and gen surg, fluid collections located deep to muscle, and given improvement on IV abx, favor medical management vs. Extensive surgical I&D.    -- ID agrees with plans to continue to abx management, as condition should resolved with abx, in conjunction with other non-invasive measures rest, elevation, and compression as tolerated.     Recommendations/Plan:  1. Recommend discontinuing IV Dapto, Cefepime, with Clinda; and switching to oral abx regimen of: Doxycycline 100mg BID Q12h, Cefpodoxime 400mg q12h, and clindamycin 450mg q8h, and observing inpatient for about 24hrs with routine labs tomorrow, CMP, CBC, and CRP.    2. If patient tolerating oral abx, and no signs of worsening over 24hrs, then pt to continue oral abx outpt for duration of 3wks (s/p discharge??)    3. Recommend continuing judicious leg elevation - ankle>knee>hip; may consider compression wrap as tolerated.     -- Discussed with ID Staff and primary team.  -- ID will continue to follow        Thank you for your consult. I will follow-up with patient. Please contact us if you have any additional questions.    Herbert Villalobos PA-C  Infectious Disease  [facility] - Intensive Care ([place])    Subjective:     Principal Problem:Cellulitis of right lower extremity    HPI: 40 you male without significant PMHx pw RLE pain and swelling 12/6/22.  He had been tin [place] last week and developed RLE pain/swelling and was seen by UC there (last Fri) and given po prednisone and abx.  His leg pain worsened and he developed fever and chills all of which later improved though he continued with night sweats.  When he returned the leg worsened with pain redness and swelling.  He presented to the ED for eval 12/6.  He was admitted with cf cellulitis.    On admit WBC was 26 and .  He was treated with vanc and rocephin then clinda/rocephin WBC improved then trended up to 14s.      MRI RLE done cf: regions of intramuscular edema, most pronounced in adductor deysi, biceps femoris, and gastrocnemius.  Intermuscular edema as well, most pronounced in the posterior thigh compartment.  There are curvilinear rim enhancing fluid collections along the posterior aspects of semimembranosus and medial gastrocnemius.     Gen Sx consulted abd followed x 2d but rec abx, no surgical intervention.  ID consulted for cellulitis.  Patient seen and still co of significant leg pain - 3 /10 at rest but 30/10 with walking.  Leg still swollen but now with progressive bruising over last 12 h in calf.  Reports leg previously weeping/blistering.  Denies injury to leg but possible bug bit behind knee.  Denies knee pain with movement.  No water animal/ water exposure  Remains on and tolerating clindamycin and ceftriaxone. Blood cultures NGTD.    Interval History: NAEON. Pt with steady improvement of leg swelling, redness, warmth and dependent leg pain. VSS, HDS. Remains afebrile >48hrs, and without leukocytosis, CRP (64) continued down trend; Repeat CT thigh & tib fib 12/13 with slight increase in size of fluid collections and more orgnaized. However, gen surg favoring med management over extensive surgical I&D as fluid collections deep to muscle, and should resolved with abx.     Review of Systems   Constitutional:  Positive for appetite change. Negative for chills, diaphoresis, fatigue and fever.   HENT:   Negative for congestion, dental problem, sore throat and trouble swallowing.    Eyes:  Negative for pain and visual disturbance.   Respiratory:  Negative for cough and wheezing.    Cardiovascular:  Positive for leg swelling. Negative for chest pain and palpitations.   Gastrointestinal:  Negative for abdominal pain, constipation, diarrhea, nausea and vomiting.   Genitourinary:  Negative for dysuria, flank pain, hematuria and urgency.   Musculoskeletal:  Positive for arthralgias, joint swelling and myalgias. Negative for back pain, gait problem, neck pain and neck stiffness.   Skin:  Positive for color change, rash and wound.   Neurological:  Negative for seizures and headaches.   Psychiatric/Behavioral:  Negative for behavioral problems and confusion. The patient is not nervous/anxious.    Objective:     Vital Signs (Most Recent):  Temp: 97.9 °F (36.6 °C) (12/14/22 1142)  Pulse: 90 (12/14/22 1142)  Resp: 18 (12/14/22 1554)  BP: (!) 102/59 (12/14/22 1142)  SpO2: 98 % (12/14/22 1142)   Vital Signs (24h Range):  Temp:  [97.7 °F (36.5 °C)-98.4 °F (36.9 °C)] 97.9 °F (36.6 °C)  Pulse:  [75-91] 90  Resp:  [17-20] 18  SpO2:  [92 %-98 %] 98 %  BP: (102-116)/(55-69) 102/59     Weight: 102.1 kg (225 lb)  Body mass index is 33.23 kg/m².    Estimated Creatinine Clearance: 109.4 mL/min (based on SCr of 1.1 mg/dL).    Physical Exam  Constitutional:       General: He is not in acute distress.     Appearance: Normal appearance. He is not ill-appearing, toxic-appearing or diaphoretic.   HENT:      Head: Normocephalic and atraumatic.      Mouth/Throat:      Mouth: Mucous membranes are moist.      Pharynx: Oropharynx is clear.   Eyes:      General: No scleral icterus.     Conjunctiva/sclera: Conjunctivae normal.   Cardiovascular:      Rate and Rhythm: Normal rate and regular rhythm.      Pulses: Normal pulses.      Heart sounds: Normal heart sounds.   Pulmonary:      Effort: Pulmonary effort is normal.      Breath sounds: Normal breath  sounds.   Abdominal:      General: Bowel sounds are normal. There is no distension.      Palpations: Abdomen is soft.      Tenderness: There is no abdominal tenderness.   Musculoskeletal:         General: Swelling and tenderness present.      Cervical back: Normal range of motion. No rigidity or tenderness.      Right lower leg: Edema present.      Left lower leg: No edema.   Lymphadenopathy:      Cervical: No cervical adenopathy.   Skin:     General: Skin is warm and dry.      Capillary Refill: Capillary refill takes less than 2 seconds.      Findings: Bruising and erythema present.      Comments: Increased induration noted at posterior compartments of right thigh and calf. Swelling, redness, warmth, and pain improving.   Neurological:      Mental Status: He is alert and oriented to person, place, and time. Mental status is at baseline.   Psychiatric:         Mood and Affect: Mood normal.         Behavior: Behavior normal.         Thought Content: Thought content normal.         Judgment: Judgment normal.           Significant Labs: All pertinent labs within the past 24 hours have been reviewed.    Significant Imaging: I have reviewed all pertinent imaging results/findings within the past 24 hours.

## 2022-12-15 NOTE — PLAN OF CARE
Problem: Adult Inpatient Plan of Care  Goal: Plan of Care Review  Outcome: Ongoing, Progressing  Goal: Patient-Specific Goal (Individualized)  Outcome: Ongoing, Progress   Pt AAO X 4; able to express needs.  Leg redness and edema improved.  C/o pain when ambulating to bathroom with crutches.  Oxy  given as ordered and effective. IV ABX discontinued &   PO ABX given now.  Possible discharge home soon.   Safety maintained.  Bed in low position,  call  light in reach.

## 2022-12-15 NOTE — SUBJECTIVE & OBJECTIVE
Interval History: NAEON. Pt with steady improvement of leg swelling, redness, warmth and dependent leg pain. VSS, HDS. Remains afebrile >48hrs, and without leukocytosis, CRP (64) continued down trend; Repeat CT thigh & tib fib 12/13 with slight increase in size of fluid collections and more orgnaized. However, gen surg favoring med management over extensive surgical I&D as fluid collections deep to muscle, and should resolved with abx.     Review of Systems   Constitutional:  Positive for appetite change. Negative for chills, diaphoresis, fatigue and fever.   HENT:  Negative for congestion, dental problem, sore throat and trouble swallowing.    Eyes:  Negative for pain and visual disturbance.   Respiratory:  Negative for cough and wheezing.    Cardiovascular:  Positive for leg swelling. Negative for chest pain and palpitations.   Gastrointestinal:  Negative for abdominal pain, constipation, diarrhea, nausea and vomiting.   Genitourinary:  Negative for dysuria, flank pain, hematuria and urgency.   Musculoskeletal:  Positive for arthralgias, joint swelling and myalgias. Negative for back pain, gait problem, neck pain and neck stiffness.   Skin:  Positive for color change, rash and wound.   Neurological:  Negative for seizures and headaches.   Psychiatric/Behavioral:  Negative for behavioral problems and confusion. The patient is not nervous/anxious.    Objective:     Vital Signs (Most Recent):  Temp: 97.9 °F (36.6 °C) (12/14/22 1142)  Pulse: 90 (12/14/22 1142)  Resp: 18 (12/14/22 1554)  BP: (!) 102/59 (12/14/22 1142)  SpO2: 98 % (12/14/22 1142)   Vital Signs (24h Range):  Temp:  [97.7 °F (36.5 °C)-98.4 °F (36.9 °C)] 97.9 °F (36.6 °C)  Pulse:  [75-91] 90  Resp:  [17-20] 18  SpO2:  [92 %-98 %] 98 %  BP: (102-116)/(55-69) 102/59     Weight: 102.1 kg (225 lb)  Body mass index is 33.23 kg/m².    Estimated Creatinine Clearance: 109.4 mL/min (based on SCr of 1.1 mg/dL).    Physical Exam  Constitutional:       General: He is  not in acute distress.     Appearance: Normal appearance. He is not ill-appearing, toxic-appearing or diaphoretic.   HENT:      Head: Normocephalic and atraumatic.      Mouth/Throat:      Mouth: Mucous membranes are moist.      Pharynx: Oropharynx is clear.   Eyes:      General: No scleral icterus.     Conjunctiva/sclera: Conjunctivae normal.   Cardiovascular:      Rate and Rhythm: Normal rate and regular rhythm.      Pulses: Normal pulses.      Heart sounds: Normal heart sounds.   Pulmonary:      Effort: Pulmonary effort is normal.      Breath sounds: Normal breath sounds.   Abdominal:      General: Bowel sounds are normal. There is no distension.      Palpations: Abdomen is soft.      Tenderness: There is no abdominal tenderness.   Musculoskeletal:         General: Swelling and tenderness present.      Cervical back: Normal range of motion. No rigidity or tenderness.      Right lower leg: Edema present.      Left lower leg: No edema.   Lymphadenopathy:      Cervical: No cervical adenopathy.   Skin:     General: Skin is warm and dry.      Capillary Refill: Capillary refill takes less than 2 seconds.      Findings: Bruising and erythema present.      Comments: Increased induration noted at posterior compartments of right thigh and calf. Swelling, redness, warmth, and pain improving.   Neurological:      Mental Status: He is alert and oriented to person, place, and time. Mental status is at baseline.   Psychiatric:         Mood and Affect: Mood normal.         Behavior: Behavior normal.         Thought Content: Thought content normal.         Judgment: Judgment normal.           Significant Labs: All pertinent labs within the past 24 hours have been reviewed.    Significant Imaging: I have reviewed all pertinent imaging results/findings within the past 24 hours.

## 2022-12-15 NOTE — ASSESSMENT & PLAN NOTE
Limited Hx of reported PCN allergy. Pt states that per mother recollection, pt received pcn abx around age of 3-6yo for unknown infection / illness, and in about 1-2hr noticed rash at legs. To date, patient is uncertain if he has received penicillin abx since then.  Patient has tolerated IV and oral 3rd and 4th gen cephalosporins without issue. No other personal known drug or food allergies.    PLAN:  1. Placed order for outpt referral to immunology / allergy for PCN allergy testing.

## 2022-12-15 NOTE — PROGRESS NOTES
[facility] Medicine  Daily progress note    Patient Name: Jim Waddell  MRN: [id number]  Patient Class: IP- Inpatient  Admission Date: 12/6/2022  Attending Physician: Dilan Valle MD   Primary Care Provider: Primary Doctor No      Patient information was obtained from patient, past medical records and ER records.     Subjective:     Principal Problem:Cellulitis of right lower extremity    Chief Complaint:   Chief Complaint   Patient presents with    Leg Pain     Pt c/o left lower leg pain and swelling.  Reports redness behind knee and then blistering to calf. States he has been walking around a lot at [place] past few days.         HPI: This is a 38yo male with no reported past medical history who presents to the ED with chief complaint of RLE pain and swelling. Patient states that one week ago he was having URI symptoms and was treated with azithromycin and steroid injection at Urgent care. Later same week was still not feeling well and went to [facility] Room in [place] and states he got another steroid shot and also vitamin infusion. Reports feeling well the next couple days and went to [place]. On Friday patient had new onset fevers with high temp 102.8. Went to  in [place] on 12/3 and got levaquin and prednisone.  Next day patient had new onset lower calf pain and since that time pain has been progressively worsening with associated RLE swelling, erythema, warmth, tenderness. Denies numbness, tingling, nausea, vomiting, chest pain, shortness of breath, weakness, abdominal pain or confusion.     In the ED patient afebrile and hemodynamically stable saturating well on room air. WBC 26.48, LA 1.0. US RLE negative for DVT and Xray negative for acute fracture. Cellulitis skin changes present from low calf to mid medial thigh. Full ROM of all extremities and knee. ESR and CRP significantly elevated. Patient started on iv abx and admitted to the care of medicine for further evaluation and  management.      37-year-old male without significant past medical history presents with right lower extremity redness and tenderness consistent with cellulitis.  Imaging suggested the presence of a small fluid collection.  General surgery has been consulted and at this point does not feel that there is a drainable collection but will continue to follow.      On admission the patient was started on vancomycin and ceftriaxone, then transition to ceftriaxone and clindamycin. ID consulted as cellulitis remained severe.         Subjective:  NAEON. No chest pain, shortness of breath, lightheadedness.  Pain improving compared to admission but still present. No purulent drainage.     NAD, AO3  NC, AT  RRR  CTAB  SNTND+BS  Right lower extremity with less swelling and dull/ dark erythema now. Leg is still TTT. Improving.     Vitals, labs and radiographs from past 24h reviewed and personally interpreted.     Assessment/Plan:     * Cellulitis of right lower extremity  - general surgery was following and no surgical intervention warranted.   - ID consulted 12/8-- abx upgraded. Case re discussed with gen surg/ HM and ID.currently on ( clinda, cefepime and dapto)  - gen surg re eval-- CT : no evidence of subcu emphysema    -re-image per ID rec; gen surg and ID contacted regarding results  12/14: transitioning to vignesh, cefopoxime and doxy PO/ Follow transition in house for 24-48h      Leukocytosis  - 2/2 acute infection.     VTE Risk Mitigation (From admission, onward)           Ordered     enoxaparin injection 40 mg  Daily         12/06/22 1758     IP VTE HIGH RISK PATIENT  Once         12/06/22 1758     Place sequential compression device  Until discontinued         12/06/22 1758     Place sequential compression device  Until discontinued         12/06/22 1745                    Dilan Valle MD  Department of Hospital Medicine   [facility] - Emergency Dept

## 2022-12-15 NOTE — DISCHARGE SUMMARY
[facility] - Intensive Care ([address] Medicine  Discharge Summary      Patient Name: Jim Waddell  MRN: [id number]  Admission Date: 12/6/2022  Hospital Length of Stay: 7 days  Discharge Date and Time:  12/15/2022 4:16 PM  Attending Physician: Dilan Valle MD   Discharging Provider: Dilan Valle MD  Primary Care Provider: Primary Doctor No        HPI:       This is a 38yo male with no reported past medical history who presents to the ED with chief complaint of RLE pain and swelling. Patient states that one week ago he was having URI symptoms and was treated with azithromycin and steroid injection at Urgent care. Later same week was still not feeling well and went to [facility] in [place] and states he got another steroid shot and also vitamin infusion. Reports feeling well the next couple days and went to [place]. On Friday patient had new onset fevers with high temp 102.8. Went to  in [place] on 12/3 and got levaquin and prednisone.  Next day patient had new onset lower calf pain and since that time pain has been progressively worsening with associated RLE swelling, erythema, warmth, tenderness. Denies numbness, tingling, nausea, vomiting, chest pain, shortness of breath, weakness, abdominal pain or confusion.      In the ED patient afebrile and hemodynamically stable saturating well on room air. WBC 26.48, LA 1.0. US RLE negative for DVT and Xray negative for acute fracture. Cellulitis skin changes present from low calf to mid medial thigh. Full ROM of all extremities and knee. ESR and CRP significantly elevated. Patient started on iv abx and admitted to the care of medicine for further evaluation and management.    * No surgery found *      Hospital Course:       The patient was admitted with cellulitis.  Initial imaging was concerning for abscess but was insufficient to allow for surgical drainage.  Both surgery and Infectious Disease were consulted.     While in-house he  was treated with daptomycin, clindamycin and cefepime.  With this regimen, he slowly improved.  Repeat imaging demonstrated the persistence of fluid collections.  Surgery was again consulted.  Ultimately it was decided that as he was improving, no surgical intervention would be recommended unless he failed antibiotic therapy.     Antibiotics were changed to cefpodoxime, clindamycin and doxycycline the day before discharge and he tolerated this.  He will be discharged on 3 weeks of antibiotic therapy and is to see Infectious Disease in clinic prior to the end of antibiotic therapy.    * Cellulitis of right lower extremity  - general surgery was following and no surgical intervention warranted.   - ID consulted 12/8-- abx upgraded. Case re discussed with gen surg/ HM and ID.currently on ( clinda, cefepime and dapto)  - gen surg re eval-- CT : no evidence of subcu emphysema    -re-image per ID rec; gen surg and ID contacted regarding results as above  12/14: transitioning to vignesh, cefopoxime and doxy PO. Tolerated as above        Leukocytosis  - 2/2 acute infection.         Consults:   Consults (From admission, onward)          Status Ordering Provider     Inpatient consult to Infectious Diseases  Once        Provider:  (Not yet assigned)    Completed S[place] [facility]     Inpatient consult to General Surgery  Once        Provider:  (Not yet assigned)    Completed DELILAH PEREZ            Final Active Diagnoses:    Diagnosis Date Noted POA    PRINCIPAL PROBLEM:  Cellulitis of right lower extremity [L03.115] 12/06/2022 Yes    History of penicillin allergy [Z88.0] 12/15/2022 Not Applicable    Pyomyositis [M60.009] 12/12/2022 Yes    Leukocytosis [D72.829] 12/06/2022 Unknown      Problems Resolved During this Admission:      Discharged Condition: stable    Disposition: Home or Self Care    Follow Up:    Patient Instructions:      CRUTCHES FOR HOME USE     Order Specific Question Answer Comments   Type: Axillary   "  Height: 5' 9" (1.753 m)    Weight: 102.1 kg (225 lb)    Does patient have medical equipment at home? none    Length of need (1-99 months): 99      Ambulatory referral/consult to Infectious Disease   Standing Status: Future   Referral Priority: Routine Referral Type: Consultation   Referral Reason: Specialty Services Required   Requested Specialty: Infectious Diseases   Number of Visits Requested: 1     Ambulatory referral/consult to Physical/Occupational Therapy   Standing Status: Future   Referral Priority: Routine Referral Type: Physical Medicine   Referral Reason: Specialty Services Required   Requested Specialty: Physical Therapy   Number of Visits Requested: 1     Ambulatory referral/consult to Physical/Occupational Therapy   Standing Status: Future   Referral Priority: Routine Referral Type: Physical Medicine   Referral Reason: Specialty Services Required   Requested Specialty: Occupational Therapy   Number of Visits Requested: 1     Notify your health care provider if you experience any of the following:  temperature >100.4     Notify your health care provider if you experience any of the following:  persistent nausea and vomiting or diarrhea     Notify your health care provider if you experience any of the following:  severe uncontrolled pain     Notify your health care provider if you experience any of the following:  difficulty breathing or increased cough     Notify your health care provider if you experience any of the following:  severe persistent headache     Notify your health care provider if you experience any of the following:  worsening rash     Notify your health care provider if you experience any of the following:  persistent dizziness, light-headedness, or visual disturbances     Notify your health care provider if you experience any of the following:  increased confusion or weakness     Notify your health care provider if you experience any of the following:     Medications:  Reconciled " Home Medications:      Medication List        START taking these medications      cefpodoxime 200 MG tablet  Commonly known as: VANTIN  Take 2 tablets (400 mg total) by mouth every 12 (twelve) hours. for 21 days     clindamycin 150 MG capsule  Commonly known as: CLEOCIN  Take 3 capsules (450 mg total) by mouth every 8 (eight) hours. for 21 days     doxycycline 100 MG tablet  Commonly known as: VIBRA-TABS  Take 1 tablet (100 mg total) by mouth every 12 (twelve) hours. for 21 days     oxyCODONE 10 mg Tab immediate release tablet  Commonly known as: ROXICODONE  Take 1 tablet (10 mg total) by mouth every 6 (six) hours as needed for Pain.            CHANGE how you take these medications      betamethasone valerate 0.1% 0.1 % Lotn  Commonly known as: VALISONE  AAA of scalp daily-bid prn scaling/itching.  What changed: additional instructions     fluocinonide 0.05% 0.05 % cream  Commonly known as: LIDEX  If this was being applied to the area of infection on your leg, stop using it.  What changed: additional instructions            CONTINUE taking these medications      albuterol 90 mcg/actuation inhaler  Commonly known as: PROVENTIL/VENTOLIN HFA  Inhale 2 puffs into the lungs every 6 (six) hours as needed for Wheezing or Shortness of Breath. Rescue     aspirin 81 MG EC tablet  Commonly known as: ECOTRIN  Take 81 mg by mouth once.     ciclopirox 1 % shampoo  LATHER SCALP FOR 5-10 MINUTES, THEN RINSE. USE ONCE TO TWICE WEEKLY     fluticasone propionate 50 mcg/actuation nasal spray  Commonly known as: FLONASE  1 spray by Each Nostril route 2 (two) times daily as needed for Allergies.     ibuprofen 200 MG tablet  Commonly known as: ADVIL,MOTRIN  Take 400-600 mg by mouth every 6 (six) hours as needed for Pain (fever).     promethazine-dextromethorphan 6.25-15 mg/5 mL Syrp  Commonly known as: PROMETHAZINE-DM  Take 5 mLs by mouth every 6 (six) hours as needed (cough).     pseudoephedrine 30 MG tablet  Commonly known as:  SUDAFED  Take 30 mg by mouth every 6 (six) hours as needed for Congestion.            STOP taking these medications      acetaminophen 500 MG tablet  Commonly known as: TYLENOL     levoFLOXacin 500 MG tablet  Commonly known as: LEVAQUIN     predniSONE 20 MG tablet  Commonly known as: DELTASONE                Pending Diagnostic Studies:       None          Indwelling Lines/Drains at time of discharge:   Lines/Drains/Airways       None                   Time spent on the discharge of patient: 35 minutes         Dilan Valle MD  Department of Hospital Medicine  [facility] - Intensive Care ([place]-16)

## 2022-12-15 NOTE — ASSESSMENT & PLAN NOTE
SSTI w/ pyomyositis/fascitis [with low level suspicion for NSTI]--  36 yo male with no significant PMHX pw RLE pain and cellulitis with cf deep infection (pyomyositis / fascitis) progressed on clindamycin and rocephin with MRI showing abscess and possible myositis/ fascial involvement. Per dw radiology abscess measures gastroc is 9.5 x 6.6 x 0.5 cm,  semimembranosus is 6.5 x 4.0 x 1.2 cm and both very thin. Blood cxs NG.  on admit. Gen sx consulted for urgent eval due to concerns for NSTI; obtained LLE CT which was stable. U/S w/o evidence cf NSTI. Gen surg rec no surgical intervention, favoring medical management for SSTI, with low level concern for NSTI.   -- D/c'd ceftriaxone 12/09; changed to Daptomycin, cefepime and clindamycin; [with clinda d/c'd after approx. 5d on 12/11].  -- Overall, pt improving; remains stable, non-septic, leukocytosis resolved, afebrile >48hrs. CRP continuing to improve (64), down from 280 on admit.   There remains induration posterior aspect of calf and medial thigh, w/ swelling, some warmth, and superficial hemorrhagic skin changes, but no weeping wounds, skin intact. Sxs continuing to improve, and overlying skin changes representative of healing process. Also, pt still with dependent leg pain when leg hanging down w/ gravity, but has become more tolerable, and pt able to use crutches for ADLs.    (12/13) Repeat CT-Thigh & Tib-fib: c/f worsening SSTI w/ increased size of fluid collections and early abscess development.  1. Interval development of 3 low-attenuation, rim enhancing complex fluid collections along posterior surface of semimembranosus(1.3 x 2.2 x 10.5 cm) , medial gastrocnemius (1.2 x 4 x 13.1 cm ) and lateral gastrocnemius (1.5 x 2.6 x 0.2 cm), which is most concerning for early abscesses.    2. No CT findings to suggest superimposed gas, however, component of necrotizing fascitis cannot be excluded.    3.. Additional ill-defined low-attenuation fluid with  surrounding inflammatory change at the level of the popliteal fossa, most concerning for phlegmonous change noting lack of clear organization and rim enhancement.   -- Per d/w primary team and gen surg, fluid collections located deep to muscle, and given improvement on IV abx, favor medical management vs. Extensive surgical I&D.    -- ID agrees with plans to continue to abx management, as condition should resolved with abx, in conjunction with other non-invasive measures rest, elevation, and compression as tolerated.     Recommendations/Plan:  1. Recommend discontinuing IV Dapto, Cefepime, with Clinda; and switching to oral abx regimen of: Doxycycline 100mg BID Q12h, Cefpodoxime 400mg q12h, and clindamycin 450mg q8h, and observing inpatient for about 24hrs with routine labs tomorrow, CMP, CBC, and CRP.    2. If patient tolerating oral abx, and no signs of worsening over 24hrs, then pt to continue oral abx outpt for duration of 3wks (s/p discharge??)    3. Recommend continuing judicious leg elevation - ankle>knee>hip; may consider compression wrap as tolerated.     -- Discussed with ID Staff and primary team.  -- ID will continue to follow

## 2022-12-16 DIAGNOSIS — M60.009 PYOMYOSITIS: Primary | ICD-10-CM

## 2022-12-16 DIAGNOSIS — L03.115 CELLULITIS OF RIGHT LOWER EXTREMITY: ICD-10-CM

## 2022-12-16 NOTE — NURSING
Patients AVS given and explained in great detail. PIV removed and crutches given to take home. I assisted patient and family down to car.

## 2022-12-16 NOTE — ASSESSMENT & PLAN NOTE
SSTI w/ pyomyositis/fascitis [with low level suspicion for NSTI]--  36 yo male with no significant PMHX pw RLE pain and cellulitis with cf deep infection (pyomyositis / fascitis) progressed on clindamycin and rocephin with MRI showing abscess and possible myositis/ fascial involvement. Per dw radiology abscess measures gastroc is 9.5 x 6.6 x 0.5 cm,  semimembranosus is 6.5 x 4.0 x 1.2 cm and both very thin. Blood cxs NG.  on admit. Gen sx consulted for urgent eval due to concerns for NSTI; obtained LLE CT which was stable. U/S w/o evidence cf NSTI. Gen surg rec no surgical intervention, favoring medical management for SSTI, with low level concern for NSTI.   -- D/c'd ceftriaxone 12/09; changed to Daptomycin, cefepime and clindamycin; [with clinda d/c'd after approx. 5d on 12/11].  -- Overall, pt improving; remains stable, non-septic, leukocytosis resolved, afebrile >48hrs. CRP continuing to improve (64), down from 280 on admit.   There remains induration posterior aspect of calf and medial thigh, w/ swelling, some warmth, and superficial hemorrhagic skin changes, but no weeping wounds, skin intact. Sxs continuing to improve, and overlying skin changes representative of healing process. Also, pt still with dependent leg pain when leg hanging down w/ gravity, but has become more tolerable, and pt able to use crutches for ADLs.    (12/13) Repeat CT-Thigh & Tib-fib: c/f worsening SSTI w/ increased size of fluid collections and early abscess development.  1. Interval development of 3 low-attenuation, rim enhancing complex fluid collections along posterior surface of semimembranosus(1.3 x 2.2 x 10.5 cm) , medial gastrocnemius (1.2 x 4 x 13.1 cm ) and lateral gastrocnemius (1.5 x 2.6 x 0.2 cm), which is most concerning for early abscesses.    2. No CT findings to suggest superimposed gas, however, component of necrotizing fascitis cannot be excluded.    3.. Additional ill-defined low-attenuation fluid with  surrounding inflammatory change at the level of the popliteal fossa, most concerning for phlegmonous change noting lack of clear organization and rim enhancement.   -- Per d/w primary team, IR not recommending aspiration attempt; gen surg not recommending surg intervention at this time as fluid collections located deep to muscle, and given improvement on IV abx, currently favoring medical management vs. Extensive surgical I&D.  -- While source control and cx guided abx tx would be ideal, pt with steady improvement with abx, and condition may likely resolve with abx management.  -- Pt transitioned to oral abx regimen last night (12/14), w/ doxy, cefpodoxime, and clinda. Pt tolerating well, and with continued steady improvement.    Recommendations/Plan:  1. Recommend continuing oral abx regimen of: Doxycycline 100mg BID Q12h, Cefpodoxime 400mg q12h, and clindamycin 450mg q8h.    2. Duration of abx pending patient progression and repeat CT imaging in 2wks, prior to ID clinic f/u appt in 3wks. Pt given 3wks of oral abx.   -- IF repeat CT imaging with satisfactory resolution at 2wks, and pt improved, then may d/c abx at that time.   -- IF repeat CT imaging improved, but well resolved, then may extend abx therapy to 3wks, or longer until resolution achieved.   -- If no improvement, or worsening, then may require source control w/ IR and /or Gen surg; and likely adjustment of abx regimen.    3. Recommend continuing judicious leg elevation - ankle>knee>hip; may consider compression wrap for swelling/edema.  4. ID will schedule pt for ID clinic f/u appt in 3wks for hosp f/u; and repeat CT of thigh & tib-fib to be completed in 2wks s/p 12/15/22 --approx. 12/29/22.    -- Discussed with ID Staff and primary team.  -- ID will sign off.

## 2022-12-16 NOTE — PROGRESS NOTES
[facility] ([place] [id number])  Infectious Disease  Progress Note    Patient Name: Jim Waddell  MRN: [id number]  Admission Date: 12/6/2022  Length of Stay: 7 days  Attending Physician: No att. providers found  Primary Care Provider: Primary Doctor No    Isolation Status: No active isolations  Assessment/Plan:      Pyomyositis  SSTI w/ pyomyositis/fascitis [with low level suspicion for NSTI]--  38 yo male with no significant PMHX pw RLE pain and cellulitis with cf deep infection (pyomyositis / fascitis) progressed on clindamycin and rocephin with MRI showing abscess and possible myositis/ fascial involvement. Per dw radiology abscess measures gastroc is 9.5 x 6.6 x 0.5 cm,  semimembranosus is 6.5 x 4.0 x 1.2 cm and both very thin. Blood cxs NG.  on admit. Gen sx consulted for urgent eval due to concerns for NSTI; obtained LLE CT which was stable. U/S w/o evidence cf NSTI. Gen surg rec no surgical intervention, favoring medical management for SSTI, with low level concern for NSTI.   -- D/c'd ceftriaxone 12/09; changed to Daptomycin, cefepime and clindamycin; [with clinda d/c'd after approx. 5d on 12/11].  -- Overall, pt improving; remains stable, non-septic, leukocytosis resolved, afebrile >48hrs. CRP continuing to improve (64), down from 280 on admit.   There remains induration posterior aspect of calf and medial thigh, w/ swelling, some warmth, and superficial hemorrhagic skin changes, but no weeping wounds, skin intact. Sxs continuing to improve, and overlying skin changes representative of healing process. Also, pt still with dependent leg pain when leg hanging down w/ gravity, but has become more tolerable, and pt able to use crutches for ADLs.    (12/13) Repeat CT-Thigh & Tib-fib: c/f worsening SSTI w/ increased size of fluid collections and early abscess development.  1. Interval development of 3 low-attenuation, rim enhancing complex fluid collections along posterior surface of  semimembranosus(1.3 x 2.2 x 10.5 cm) , medial gastrocnemius (1.2 x 4 x 13.1 cm ) and lateral gastrocnemius (1.5 x 2.6 x 0.2 cm), which is most concerning for early abscesses.    2. No CT findings to suggest superimposed gas, however, component of necrotizing fascitis cannot be excluded.    3.. Additional ill-defined low-attenuation fluid with surrounding inflammatory change at the level of the popliteal fossa, most concerning for phlegmonous change noting lack of clear organization and rim enhancement.   -- Per d/w primary team, IR not recommending aspiration attempt; gen surg not recommending surg intervention at this time as fluid collections located deep to muscle, and given improvement on IV abx, currently favoring medical management vs. Extensive surgical I&D.  -- While source control and cx guided abx tx would be ideal, pt with steady improvement with abx, and condition may likely resolve with abx management.  -- Pt transitioned to oral abx regimen last night (12/14), w/ doxy, cefpodoxime, and clinda. Pt tolerating well, and with continued steady improvement.    Recommendations/Plan:  1. Recommend continuing oral abx regimen of: Doxycycline 100mg BID Q12h, Cefpodoxime 400mg q12h, and clindamycin 450mg q8h.    2. Duration of abx pending patient progression and repeat CT imaging in 2wks, prior to ID clinic f/u appt in 3wks. Pt given 3wks of oral abx.   -- IF repeat CT imaging with satisfactory resolution at 2wks, and pt improved, then may d/c abx at that time.   -- IF repeat CT imaging improved, but well resolved, then may extend abx therapy to 3wks, or longer until resolution achieved.   -- If no improvement, or worsening, then may require source control w/ IR and /or Gen surg; and likely adjustment of abx regimen.    3. Recommend continuing judicious leg elevation - ankle>knee>hip; may consider compression wrap for swelling/edema.  4. ID will schedule pt for ID clinic f/u appt in 3wks for hosp f/u; and repeat  CT of thigh & tib-fib to be completed in 2wks s/p 12/15/22 --approx. 12/29/22.    -- Discussed with ID Staff and primary team.  -- ID will sign off.      History of penicillin allergy  Limited Hx of reported PCN allergy. Pt states that per mother recollection, pt received pcn abx around age of 3-6yo for unknown infection / illness, and in about 1-2hr noticed rash at legs. To date, patient is uncertain if he has received penicillin abx since then.  Patient has tolerated IV and oral 3rd and 4th gen cephalosporins without issue. No other personal known drug or food allergies.    PLAN:  1. Placed order for outpt referral to immunology / allergy for PCN allergy testing.      Thank you for your consult. I will sign off. Please contact us if you have any additional questions.    Herbert Villalobos PA-C  Infectious Disease  [facility] - Intensive Care ([facility]-)    Subjective:     Principal Problem:Cellulitis of right lower extremity    HPI: 40 you male without significant PMHx pw RLE pain and swelling 12/6/22.  He had been tin [facility] last week and developed RLE pain/swelling and was seen by  there (last Fri) and given po prednisone and abx.  His leg pain worsened and he developed fever and chills all of which later improved though he continued with night sweats.  When he returned the leg worsened with pain redness and swelling.  He presented to the ED for eval 12/6.  He was admitted with cf cellulitis.    On admit WBC was 26 and .  He was treated with vanc and rocephin then clinda/rocephin WBC improved then trended up to 14s.      MRI RLE done cf: regions of intramuscular edema, most pronounced in adductor deysi, biceps femoris, and gastrocnemius.  Intermuscular edema as well, most pronounced in the posterior thigh compartment.  There are curvilinear rim enhancing fluid collections along the posterior aspects of semimembranosus and medial gastrocnemius.     Gen Sx consulted abd followed x 2d but rec abx, no  surgical intervention.  ID consulted for cellulitis.  Patient seen and still co of significant leg pain - 3 /10 at rest but 30/10 with walking.  Leg still swollen but now with progressive bruising over last 12 h in calf.  Reports leg previously weeping/blistering.  Denies injury to leg but possible bug bit behind knee.  Denies knee pain with movement.  No water animal/ water exposure  Remains on and tolerating clindamycin and ceftriaxone. Blood cultures NGTD.    Interval History: NAEON. Pt with steady improvement of leg swelling, redness, warmth and dependent leg pain. VSS, HDS. Remains afebrile >48hrs, and without leukocytosis, CRP (64) continued down trend; Repeat CT thigh & tib fib 12/13 with slight increase in size of fluid collections and more orgnaized. However, gen surg favoring med management over extensive surgical I&D, and pr d/w with primary team, IR not recommending aspiration,and gen surg not recommending surg intervention, as pt improving with abx, and felt risk of surg outweighed benefits at this time.    Review of Systems   Constitutional:  Positive for appetite change. Negative for chills, diaphoresis, fatigue and fever.   HENT:  Negative for congestion, dental problem, sore throat and trouble swallowing.    Eyes:  Negative for pain and visual disturbance.   Respiratory:  Negative for cough and wheezing.    Cardiovascular:  Positive for leg swelling. Negative for chest pain and palpitations.   Gastrointestinal:  Negative for abdominal pain, constipation, diarrhea, nausea and vomiting.   Genitourinary:  Negative for dysuria, flank pain, hematuria and urgency.   Musculoskeletal:  Positive for arthralgias, joint swelling and myalgias. Negative for back pain, gait problem, neck pain and neck stiffness.   Skin:  Positive for color change, rash and wound.   Neurological:  Negative for seizures and headaches.   Psychiatric/Behavioral:  Negative for behavioral problems and confusion. The patient is not  nervous/anxious.    Objective:     Vital Signs (Most Recent):  Temp: 98 °F (36.7 °C) (12/15/22 1105)  Pulse: 90 (12/15/22 1105)  Resp: 19 (12/15/22 1626)  BP: 109/64 (12/15/22 1105)  SpO2: (!) 94 % (12/15/22 1105)   Vital Signs (24h Range):  Temp:  [98 °F (36.7 °C)-98.7 °F (37.1 °C)] 98 °F (36.7 °C)  Pulse:  [84-98] 90  Resp:  [17-19] 19  SpO2:  [93 %-96 %] 94 %  BP: (107-117)/(59-71) 109/64     Weight: 102.1 kg (225 lb)  Body mass index is 33.23 kg/m².    Estimated Creatinine Clearance: 120.3 mL/min (based on SCr of 1 mg/dL).    Physical Exam  Constitutional:       General: He is not in acute distress.     Appearance: Normal appearance. He is not ill-appearing, toxic-appearing or diaphoretic.   HENT:      Head: Normocephalic and atraumatic.      Mouth/Throat:      Mouth: Mucous membranes are moist.      Pharynx: Oropharynx is clear.   Eyes:      General: No scleral icterus.     Conjunctiva/sclera: Conjunctivae normal.   Cardiovascular:      Rate and Rhythm: Normal rate and regular rhythm.      Pulses: Normal pulses.      Heart sounds: Normal heart sounds.   Pulmonary:      Effort: Pulmonary effort is normal.      Breath sounds: Normal breath sounds.   Abdominal:      General: Bowel sounds are normal. There is no distension.      Palpations: Abdomen is soft.      Tenderness: There is no abdominal tenderness.   Musculoskeletal:         General: Swelling and tenderness present.      Cervical back: Normal range of motion. No rigidity or tenderness.      Right lower leg: Edema present.      Left lower leg: No edema.   Lymphadenopathy:      Cervical: No cervical adenopathy.   Skin:     General: Skin is warm and dry.      Capillary Refill: Capillary refill takes less than 2 seconds.      Findings: Bruising and erythema present.      Comments: Increased induration noted at posterior compartments of right thigh and calf. Swelling, redness, warmth, and pain improving.   Neurological:      Mental Status: He is alert and  oriented to person, place, and time. Mental status is at baseline.   Psychiatric:         Mood and Affect: Mood normal.         Behavior: Behavior normal.         Thought Content: Thought content normal.         Judgment: Judgment normal.           Significant Labs: All pertinent labs within the past 24 hours have been reviewed.    Significant Imaging: I have reviewed all pertinent imaging results/findings within the past 24 hours.

## 2022-12-16 NOTE — SUBJECTIVE & OBJECTIVE
Interval History: NAEON. Pt with steady improvement of leg swelling, redness, warmth and dependent leg pain. VSS, HDS. Remains afebrile >48hrs, and without leukocytosis, CRP (64) continued down trend; Repeat CT thigh & tib fib 12/13 with slight increase in size of fluid collections and more orgnaized. However, gen surg favoring med management over extensive surgical I&D, and pr d/w with primary team, IR not recommending aspiration,and gen surg not recommending surg intervention, as pt improving with abx, and felt risk of surg outweighed benefits at this time.    Review of Systems   Constitutional:  Positive for appetite change. Negative for chills, diaphoresis, fatigue and fever.   HENT:  Negative for congestion, dental problem, sore throat and trouble swallowing.    Eyes:  Negative for pain and visual disturbance.   Respiratory:  Negative for cough and wheezing.    Cardiovascular:  Positive for leg swelling. Negative for chest pain and palpitations.   Gastrointestinal:  Negative for abdominal pain, constipation, diarrhea, nausea and vomiting.   Genitourinary:  Negative for dysuria, flank pain, hematuria and urgency.   Musculoskeletal:  Positive for arthralgias, joint swelling and myalgias. Negative for back pain, gait problem, neck pain and neck stiffness.   Skin:  Positive for color change, rash and wound.   Neurological:  Negative for seizures and headaches.   Psychiatric/Behavioral:  Negative for behavioral problems and confusion. The patient is not nervous/anxious.    Objective:     Vital Signs (Most Recent):  Temp: 98 °F (36.7 °C) (12/15/22 1105)  Pulse: 90 (12/15/22 1105)  Resp: 19 (12/15/22 1626)  BP: 109/64 (12/15/22 1105)  SpO2: (!) 94 % (12/15/22 1105)   Vital Signs (24h Range):  Temp:  [98 °F (36.7 °C)-98.7 °F (37.1 °C)] 98 °F (36.7 °C)  Pulse:  [84-98] 90  Resp:  [17-19] 19  SpO2:  [93 %-96 %] 94 %  BP: (107-117)/(59-71) 109/64     Weight: 102.1 kg (225 lb)  Body mass index is 33.23 kg/m².    Estimated  Creatinine Clearance: 120.3 mL/min (based on SCr of 1 mg/dL).    Physical Exam  Constitutional:       General: He is not in acute distress.     Appearance: Normal appearance. He is not ill-appearing, toxic-appearing or diaphoretic.   HENT:      Head: Normocephalic and atraumatic.      Mouth/Throat:      Mouth: Mucous membranes are moist.      Pharynx: Oropharynx is clear.   Eyes:      General: No scleral icterus.     Conjunctiva/sclera: Conjunctivae normal.   Cardiovascular:      Rate and Rhythm: Normal rate and regular rhythm.      Pulses: Normal pulses.      Heart sounds: Normal heart sounds.   Pulmonary:      Effort: Pulmonary effort is normal.      Breath sounds: Normal breath sounds.   Abdominal:      General: Bowel sounds are normal. There is no distension.      Palpations: Abdomen is soft.      Tenderness: There is no abdominal tenderness.   Musculoskeletal:         General: Swelling and tenderness present.      Cervical back: Normal range of motion. No rigidity or tenderness.      Right lower leg: Edema present.      Left lower leg: No edema.   Lymphadenopathy:      Cervical: No cervical adenopathy.   Skin:     General: Skin is warm and dry.      Capillary Refill: Capillary refill takes less than 2 seconds.      Findings: Bruising and erythema present.      Comments: Increased induration noted at posterior compartments of right thigh and calf. Swelling, redness, warmth, and pain improving.   Neurological:      Mental Status: He is alert and oriented to person, place, and time. Mental status is at baseline.   Psychiatric:         Mood and Affect: Mood normal.         Behavior: Behavior normal.         Thought Content: Thought content normal.         Judgment: Judgment normal.           Significant Labs: All pertinent labs within the past 24 hours have been reviewed.    Significant Imaging: I have reviewed all pertinent imaging results/findings within the past 24 hours.

## 2022-12-19 ENCOUNTER — OFFICE VISIT (OUTPATIENT)
Dept: INTERNAL MEDICINE | Facility: CLINIC | Age: 37
End: 2022-12-19
Payer: COMMERCIAL

## 2022-12-19 VITALS
OXYGEN SATURATION: 97 % | WEIGHT: 220.88 LBS | SYSTOLIC BLOOD PRESSURE: 108 MMHG | HEART RATE: 80 BPM | DIASTOLIC BLOOD PRESSURE: 62 MMHG | HEIGHT: 69 IN | BODY MASS INDEX: 32.72 KG/M2

## 2022-12-19 DIAGNOSIS — K21.9 GASTROESOPHAGEAL REFLUX DISEASE WITHOUT ESOPHAGITIS: ICD-10-CM

## 2022-12-19 DIAGNOSIS — L03.115 CELLULITIS OF RIGHT LOWER EXTREMITY: Primary | ICD-10-CM

## 2022-12-19 DIAGNOSIS — Z13.6 SCREENING FOR CARDIOVASCULAR CONDITION: ICD-10-CM

## 2022-12-19 DIAGNOSIS — Z76.89 ESTABLISHING CARE WITH NEW DOCTOR, ENCOUNTER FOR: ICD-10-CM

## 2022-12-19 DIAGNOSIS — Z09 HOSPITAL DISCHARGE FOLLOW-UP: ICD-10-CM

## 2022-12-19 DIAGNOSIS — Z00.00 LABORATORY EXAM ORDERED AS PART OF ROUTINE GENERAL MEDICAL EXAMINATION: ICD-10-CM

## 2022-12-19 PROCEDURE — 99214 PR OFFICE/OUTPT VISIT, EST, LEVL IV, 30-39 MIN: ICD-10-PCS | Mod: S$GLB,,, | Performed by: STUDENT IN AN ORGANIZED HEALTH CARE EDUCATION/TRAINING PROGRAM

## 2022-12-19 PROCEDURE — 3074F SYST BP LT 130 MM HG: CPT | Mod: CPTII,S$GLB,, | Performed by: STUDENT IN AN ORGANIZED HEALTH CARE EDUCATION/TRAINING PROGRAM

## 2022-12-19 PROCEDURE — 3078F PR MOST RECENT DIASTOLIC BLOOD PRESSURE < 80 MM HG: ICD-10-PCS | Mod: CPTII,S$GLB,, | Performed by: STUDENT IN AN ORGANIZED HEALTH CARE EDUCATION/TRAINING PROGRAM

## 2022-12-19 PROCEDURE — 3074F PR MOST RECENT SYSTOLIC BLOOD PRESSURE < 130 MM HG: ICD-10-PCS | Mod: CPTII,S$GLB,, | Performed by: STUDENT IN AN ORGANIZED HEALTH CARE EDUCATION/TRAINING PROGRAM

## 2022-12-19 PROCEDURE — 3078F DIAST BP <80 MM HG: CPT | Mod: CPTII,S$GLB,, | Performed by: STUDENT IN AN ORGANIZED HEALTH CARE EDUCATION/TRAINING PROGRAM

## 2022-12-19 PROCEDURE — 99999 PR PBB SHADOW E&M-EST. PATIENT-LVL IV: CPT | Mod: PBBFAC,,, | Performed by: STUDENT IN AN ORGANIZED HEALTH CARE EDUCATION/TRAINING PROGRAM

## 2022-12-19 PROCEDURE — 1111F DSCHRG MED/CURRENT MED MERGE: CPT | Mod: CPTII,S$GLB,, | Performed by: STUDENT IN AN ORGANIZED HEALTH CARE EDUCATION/TRAINING PROGRAM

## 2022-12-19 PROCEDURE — 99214 OFFICE O/P EST MOD 30 MIN: CPT | Mod: S$GLB,,, | Performed by: STUDENT IN AN ORGANIZED HEALTH CARE EDUCATION/TRAINING PROGRAM

## 2022-12-19 PROCEDURE — 99999 PR PBB SHADOW E&M-EST. PATIENT-LVL IV: ICD-10-PCS | Mod: PBBFAC,,, | Performed by: STUDENT IN AN ORGANIZED HEALTH CARE EDUCATION/TRAINING PROGRAM

## 2022-12-19 PROCEDURE — 1111F PR DISCHARGE MEDS RECONCILED W/ CURRENT OUTPATIENT MED LIST: ICD-10-PCS | Mod: CPTII,S$GLB,, | Performed by: STUDENT IN AN ORGANIZED HEALTH CARE EDUCATION/TRAINING PROGRAM

## 2022-12-19 PROCEDURE — 1160F RVW MEDS BY RX/DR IN RCRD: CPT | Mod: CPTII,S$GLB,, | Performed by: STUDENT IN AN ORGANIZED HEALTH CARE EDUCATION/TRAINING PROGRAM

## 2022-12-19 PROCEDURE — 3008F BODY MASS INDEX DOCD: CPT | Mod: CPTII,S$GLB,, | Performed by: STUDENT IN AN ORGANIZED HEALTH CARE EDUCATION/TRAINING PROGRAM

## 2022-12-19 PROCEDURE — 1159F PR MEDICATION LIST DOCUMENTED IN MEDICAL RECORD: ICD-10-PCS | Mod: CPTII,S$GLB,, | Performed by: STUDENT IN AN ORGANIZED HEALTH CARE EDUCATION/TRAINING PROGRAM

## 2022-12-19 PROCEDURE — 3008F PR BODY MASS INDEX (BMI) DOCUMENTED: ICD-10-PCS | Mod: CPTII,S$GLB,, | Performed by: STUDENT IN AN ORGANIZED HEALTH CARE EDUCATION/TRAINING PROGRAM

## 2022-12-19 PROCEDURE — 1159F MED LIST DOCD IN RCRD: CPT | Mod: CPTII,S$GLB,, | Performed by: STUDENT IN AN ORGANIZED HEALTH CARE EDUCATION/TRAINING PROGRAM

## 2022-12-19 PROCEDURE — 1160F PR REVIEW ALL MEDS BY PRESCRIBER/CLIN PHARMACIST DOCUMENTED: ICD-10-PCS | Mod: CPTII,S$GLB,, | Performed by: STUDENT IN AN ORGANIZED HEALTH CARE EDUCATION/TRAINING PROGRAM

## 2022-12-19 NOTE — PROGRESS NOTES
SUBJECTIVE     Chief Complaint   Patient presents with    [facility]    Hospital Follow Up       HPI  Jim Waddell is a 37 y.o. male with no significant past medical history that presents for hospital follow-up.     Recently admitted from 12/6/22 to 12/15/22 for RLE cellulitis failing outpatient management. Treated with IV daptomycin, clindamycin, and cefepime with noted improvement of symptoms. No surgical intervention needed. Followed by ID during admission, switched patient to oral Clindamycin, Cefopoxime, and Doxycycline. Discharged with 3 weeks of antibiotic therapy and follow up with ID as an outpatient. Patient reports that cellulitis has improved since discharge. Says that area gets a little red if he stands upright for a while. Has noticed a small area of induration in the right popliteal fossa that he noted shortly after discharge. Denies fevers, chills, nausea, vomiting. Will schedule follow up with ID today. Has referral in place to outpatient PT, requesting external referral to [facility].     Today complains of acid reflux. Has been on PPI in the past with some benefit.     PAST MEDICAL HISTORY:  No past medical history on file.    PAST SURGICAL HISTORY:  Past Surgical History:   Procedure Laterality Date    APPENDECTOMY      COLONOSCOPY N/A 3/25/2021    Procedure: COLONOSCOPY suprep;  Surgeon: Asad Powers MD;  Location: [facility];  Service: Endoscopy;  Laterality: N/A;    INGUINAL HERNIA REPAIR  2018       FAMILY HISTORY:  Family History   Problem Relation Age of Onset    No Known Problems Mother     No Known Problems Father     Melanoma Neg Hx        ALLERGIES AND MEDICATIONS: updated and reviewed.  Review of patient's allergies indicates:   Allergen Reactions    Pcn [penicillins]      Current Outpatient Medications   Medication Sig Dispense Refill    albuterol (PROVENTIL/VENTOLIN HFA) 90 mcg/actuation inhaler Inhale 2 puffs into the lungs every 6 (six) hours as needed for  Wheezing or Shortness of Breath. Rescue      aspirin (ECOTRIN) 81 MG EC tablet Take 81 mg by mouth once.      betamethasone valerate 0.1% (VALISONE) 0.1 % Lotn AAA of scalp daily-bid prn scaling/itching. (Patient taking differently: Apply to the affected area of scalp once to twice daily as needed for scaling/itching.) 60 mL 3    cefpodoxime (VANTIN) 200 MG tablet Take 2 tablets (400 mg total) by mouth every 12 (twelve) hours. for 21 days 84 tablet 0    ciclopirox 1 % shampoo LATHER SCALP FOR 5-10 MINUTES, THEN RINSE. USE ONCE TO TWICE WEEKLY 240 mL 5    clindamycin (CLEOCIN) 150 MG capsule Take 3 capsules (450 mg total) by mouth every 8 (eight) hours. for 21 days 189 capsule 0    doxycycline (VIBRA-TABS) 100 MG tablet Take 1 tablet (100 mg total) by mouth every 12 (twelve) hours. for 21 days 42 tablet 0    fluocinonide 0.05% (LIDEX) 0.05 % cream If this was being applied to the area of infection on your leg, stop using it.      fluticasone propionate (FLONASE) 50 mcg/actuation nasal spray 1 spray by Each Nostril route 2 (two) times daily as needed for Allergies.      ibuprofen (ADVIL,MOTRIN) 200 MG tablet Take 400-600 mg by mouth every 6 (six) hours as needed for Pain (fever).      oxyCODONE (ROXICODONE) 10 mg Tab immediate release tablet Take 1 tablet (10 mg total) by mouth every 6 (six) hours as needed for Pain. 20 tablet 0    promethazine-dextromethorphan (PROMETHAZINE-DM) 6.25-15 mg/5 mL Syrp Take 5 mLs by mouth every 6 (six) hours as needed (cough).      pseudoephedrine (SUDAFED) 30 MG tablet Take 30 mg by mouth every 6 (six) hours as needed for Congestion.       Current Facility-Administered Medications   Medication Dose Route Frequency Provider Last Rate Last Admin    acetaminophen tablet 650 mg  650 mg Oral Once PRN Jose Reich MD        albuterol inhaler 2 puff  2 puff Inhalation Q20 Min PRN Jose Reich MD        diphenhydrAMINE injection 25 mg  25 mg Intravenous Once PRN Jose Reich,  "MD        EPINEPHrine (EPIPEN) 0.3 mg/0.3 mL pen injection 0.3 mg  0.3 mg Intramuscular PRN Jose Reich MD        ondansetron disintegrating tablet 4 mg  4 mg Oral Once PRN Jose Reich MD           ROS  Review of Systems   Constitutional:  Negative for activity change, chills and fever.   HENT:  Negative for congestion and hearing loss.    Eyes:  Negative for pain and visual disturbance.   Respiratory:  Negative for cough and shortness of breath.    Cardiovascular:  Negative for chest pain and palpitations.   Gastrointestinal:  Negative for abdominal pain, constipation, diarrhea, nausea and vomiting.   Endocrine: Negative.    Genitourinary: Negative.    Musculoskeletal:  Negative for arthralgias and myalgias.   Skin:         As per HPI   Allergic/Immunologic: Negative.    Neurological:  Negative for dizziness, light-headedness and headaches.   Hematological: Negative.        OBJECTIVE     Physical Exam  Vitals:    12/19/22 1052   BP: 108/62   Pulse: 80    Body mass index is 32.62 kg/m².  Weight: 100.2 kg (220 lb 14.4 oz)   Height: 5' 9" (175.3 cm)     Physical Exam  Vitals reviewed.   Constitutional:       General: He is not in acute distress.     Appearance: Normal appearance.   HENT:      Head: Normocephalic and atraumatic.      Mouth/Throat:      Mouth: Mucous membranes are moist.      Pharynx: Oropharynx is clear.   Eyes:      Extraocular Movements: Extraocular movements intact.      Conjunctiva/sclera: Conjunctivae normal.      Pupils: Pupils are equal, round, and reactive to light.   Cardiovascular:      Rate and Rhythm: Normal rate and regular rhythm.      Pulses: Normal pulses.      Heart sounds: Normal heart sounds.   Pulmonary:      Effort: Pulmonary effort is normal.      Breath sounds: Normal breath sounds.   Abdominal:      General: Bowel sounds are normal. There is no distension.      Palpations: Abdomen is soft. There is no mass.      Tenderness: There is no abdominal tenderness. There " is no guarding.   Musculoskeletal:         General: Normal range of motion.      Cervical back: Normal range of motion and neck supple. No rigidity or tenderness.      Right lower leg: No edema.      Left lower leg: No edema.   Lymphadenopathy:      Cervical: No cervical adenopathy.   Skin:     General: Skin is warm and dry.      Findings: Erythema (Improved erythema and scattered ecchymoses improved from photos in chart taken during hospitalization; induration still present but no areas of fluctuance appreciated. Some desquamation of surrounding skin present. No warmth present, non-tender) present.   Neurological:      General: No focal deficit present.      Mental Status: He is alert.   Psychiatric:         Mood and Affect: Mood normal.         Behavior: Behavior normal.         Health Maintenance         Date Due Completion Date    Lipid Panel Never done ---    Pneumococcal Vaccines (Age 0-64) (1 - PCV) Never done ---    TETANUS VACCINE Never done ---    COVID-19 Vaccine (2 - Booster for Dread series) 05/13/2021 3/18/2021              ASSESSMENT     37 y.o. male with     1. Cellulitis of right lower extremity    2. Hospital discharge follow-up    3. Gastroesophageal reflux disease without esophagitis    4. Establishing care with new doctor, encounter for    5. Laboratory exam ordered as part of routine general medical examination    6. Screening for cardiovascular condition        PLAN:     1. Hospital discharge follow-up  - Hospital discharge summary reviewed.   - Discharge med rec reviewed with patient.     2. Cellulitis of right lower extremity  - Improved on physical exam compared to photos taken during admission.   - Continue antibiotic course per ID recs; emphasized importance of taking all antibiotics.   - Follow up with ID.   - Ambulatory referral/consult to Physical/Occupational Therapy; Future    3. Gastroesophageal reflux disease without esophagitis  - Counseled on lifestyle changes.   - Will take  OTC Omeprazole.     4. Establishing care with new doctor, encounter for  - Prior notes/labs/imaging reviewed.    5. Laboratory exam ordered as part of routine general medical examination  - T4, Free; Future  - TSH; Future  - Lipid Panel; Future  - Comprehensive Metabolic Panel; Future  - CBC Auto Differential; Future    6. Screening for cardiovascular condition  - T4, Free; Future  - TSH; Future  - Lipid Panel; Future  - Comprehensive Metabolic Panel; Future  - CBC Auto Differential; Future        RTC in 6 months.      Mauricio Lomeli MD  12/19/2022 10:54 AM        No follow-ups on file.

## 2022-12-19 NOTE — PATIENT INSTRUCTIONS
Avoid fried, spicy, acidic foods  Avoid caffeine  Avoid carbonated beverages  Avoid alcohol  Avoid large meals  Avoid eating within 2 hours exercise or 2 hours prior to sleep  Small, frequent meals are best   [place] foods are better tolerated  Fish or chicken, baked or broiled

## 2022-12-27 ENCOUNTER — TELEPHONE (OUTPATIENT)
Dept: INTERNAL MEDICINE | Facility: CLINIC | Age: 37
End: 2022-12-27
Payer: COMMERCIAL

## 2022-12-27 NOTE — TELEPHONE ENCOUNTER
----- Message from Mauricio Lomeli MD sent at 12/27/2022  7:58 AM CST -----  Regarding: Physical Therapy Orders  Please fax the orders I placed for Outside Physical Therapy on 12/19/22 to the following PT office:     [facility]  Fax: [phone]    Phone: [phone]    Thanks,   TP

## 2023-01-03 ENCOUNTER — HOSPITAL ENCOUNTER (OUTPATIENT)
Dept: RADIOLOGY | Facility: HOSPITAL | Age: 38
Discharge: HOME | End: 2023-01-03
Attending: STUDENT IN AN ORGANIZED HEALTH CARE EDUCATION/TRAINING PROGRAM
Payer: COMMERCIAL

## 2023-01-03 DIAGNOSIS — M60.009 PYOMYOSITIS: ICD-10-CM

## 2023-01-03 DIAGNOSIS — L03.115 CELLULITIS OF RIGHT LOWER EXTREMITY: ICD-10-CM

## 2023-01-03 PROCEDURE — 25500020 PHARM REV CODE 255: Performed by: STUDENT IN AN ORGANIZED HEALTH CARE EDUCATION/TRAINING PROGRAM

## 2023-01-03 PROCEDURE — 73701 CT LOWER EXTREMITY W/DYE: CPT | Mod: 26,RT,, | Performed by: RADIOLOGY

## 2023-01-03 PROCEDURE — 73701 CT LOWER EXTREMITY W/DYE: CPT | Mod: TC,RT

## 2023-01-03 PROCEDURE — 73701 CT THIGH WITH CONTRAST RIGHT: ICD-10-PCS | Mod: 26,RT,, | Performed by: RADIOLOGY

## 2023-01-03 RX ADMIN — IOHEXOL 100 ML: 350 INJECTION, SOLUTION INTRAVENOUS at 02:01

## 2023-01-05 ENCOUNTER — PATIENT MESSAGE (OUTPATIENT)
Dept: INFECTIOUS DISEASES | Facility: CLINIC | Age: 38
End: 2023-01-05
Payer: COMMERCIAL

## 2023-01-06 ENCOUNTER — TELEPHONE (OUTPATIENT)
Dept: INFECTIOUS DISEASES | Facility: CLINIC | Age: 38
End: 2023-01-06
Payer: COMMERCIAL

## 2023-01-06 ENCOUNTER — TELEPHONE (OUTPATIENT)
Dept: INFECTIOUS DISEASES | Facility: HOSPITAL | Age: 38
End: 2023-01-06
Payer: COMMERCIAL

## 2023-01-06 DIAGNOSIS — L02.419 ABSCESS OF POPLITEAL REGION: Primary | ICD-10-CM

## 2023-01-06 RX ORDER — CLINDAMYCIN HYDROCHLORIDE 150 MG/1
450 CAPSULE ORAL EVERY 8 HOURS
Qty: 63 CAPSULE | Refills: 0 | Status: SHIPPED | OUTPATIENT
Start: 2023-01-06 | End: 2023-01-13

## 2023-01-06 RX ORDER — LINEZOLID 600 MG/1
600 TABLET, FILM COATED ORAL EVERY 12 HOURS
Qty: 14 TABLET | Refills: 0 | Status: SHIPPED | OUTPATIENT
Start: 2023-01-06 | End: 2023-01-13

## 2023-01-06 RX ORDER — CEFPODOXIME PROXETIL 200 MG/1
400 TABLET, FILM COATED ORAL EVERY 12 HOURS
Qty: 28 TABLET | Refills: 0 | Status: SHIPPED | OUTPATIENT
Start: 2023-01-06 | End: 2023-01-13

## 2023-01-06 NOTE — TELEPHONE ENCOUNTER
Received pt message regarding CT findings concerning for RT popliteal fossa abscess. Pt is new to me, but does have an initial visit with me next week. Pt expressed disappointment at not having received a call after CT results were in. Explained that since I was not the one who ordered the CT I did not receive the results in my inbox. He denies systemic signs or symptoms of infection at this time. Does have a palpable, tender knot at rt popliteal fossa that is not draining. Completed a course of abx yesterday and tolerated well.   --will refer to IR for abscess drainage  --will send 7 day abx refill - linezolid, cefpodoxime, clinda  --counseled on signs and symptoms of worsening infection and to present to ED asap if these were to arise.     Dona Ponce MD  Infectious Disease

## 2023-01-07 NOTE — TELEPHONE ENCOUNTER
"Called and LVM to return call. Reviewed repeat CT-Tib-Fib, which shows remaining fluid collection, more organized -- but described as "new collection" on ct report, measuring 1.8 x 2.5 x 3.3 cm -- which is improved compared to prior imaging 12/13/23 prior to hosp d/c 12/15/23.  Pt completed about 7d of IV abx inpatient, and sent out on oral abx, Doxy, Cefpodoxime, and Clinda to take for 3wks, (ends 01/05/23). Gen surg and IR recommended against intervention, and favored abx therapy until resolved,   -- If pt clinically improving and tolerating regimen, will likely extend course for additional 3-4wks, until popliteal abscess well resolved.  -- will call pt again tomorrow to f/u, to determine plans for management and abx tx. Pt has ID f/u appt upcoming (01/11 weds or 01/12 thurs).      "

## 2023-01-09 ENCOUNTER — TELEPHONE (OUTPATIENT)
Dept: INTERVENTIONAL RADIOLOGY/VASCULAR | Facility: CLINIC | Age: 38
End: 2023-01-09
Payer: COMMERCIAL

## 2023-01-09 DIAGNOSIS — L02.419 ABSCESS OF POPLITEAL REGION: Primary | ICD-10-CM

## 2023-01-11 ENCOUNTER — OFFICE VISIT (OUTPATIENT)
Dept: INFECTIOUS DISEASES | Facility: CLINIC | Age: 38
End: 2023-01-11
Payer: COMMERCIAL

## 2023-01-11 VITALS
SYSTOLIC BLOOD PRESSURE: 102 MMHG | HEART RATE: 65 BPM | HEIGHT: 69 IN | WEIGHT: 226.19 LBS | TEMPERATURE: 98 F | DIASTOLIC BLOOD PRESSURE: 70 MMHG | BODY MASS INDEX: 33.5 KG/M2

## 2023-01-11 DIAGNOSIS — L03.115 CELLULITIS OF RIGHT LOWER EXTREMITY: Primary | ICD-10-CM

## 2023-01-11 PROCEDURE — 3078F PR MOST RECENT DIASTOLIC BLOOD PRESSURE < 80 MM HG: ICD-10-PCS | Mod: CPTII,S$GLB,, | Performed by: INTERNAL MEDICINE

## 2023-01-11 PROCEDURE — 3074F PR MOST RECENT SYSTOLIC BLOOD PRESSURE < 130 MM HG: ICD-10-PCS | Mod: CPTII,S$GLB,, | Performed by: INTERNAL MEDICINE

## 2023-01-11 PROCEDURE — 3074F SYST BP LT 130 MM HG: CPT | Mod: CPTII,S$GLB,, | Performed by: INTERNAL MEDICINE

## 2023-01-11 PROCEDURE — 3008F PR BODY MASS INDEX (BMI) DOCUMENTED: ICD-10-PCS | Mod: CPTII,S$GLB,, | Performed by: INTERNAL MEDICINE

## 2023-01-11 PROCEDURE — 3078F DIAST BP <80 MM HG: CPT | Mod: CPTII,S$GLB,, | Performed by: INTERNAL MEDICINE

## 2023-01-11 PROCEDURE — 99999 PR PBB SHADOW E&M-EST. PATIENT-LVL IV: ICD-10-PCS | Mod: PBBFAC,,, | Performed by: INTERNAL MEDICINE

## 2023-01-11 PROCEDURE — 3008F BODY MASS INDEX DOCD: CPT | Mod: CPTII,S$GLB,, | Performed by: INTERNAL MEDICINE

## 2023-01-11 PROCEDURE — 1111F DSCHRG MED/CURRENT MED MERGE: CPT | Mod: CPTII,S$GLB,, | Performed by: INTERNAL MEDICINE

## 2023-01-11 PROCEDURE — 1159F MED LIST DOCD IN RCRD: CPT | Mod: CPTII,S$GLB,, | Performed by: INTERNAL MEDICINE

## 2023-01-11 PROCEDURE — 99215 PR OFFICE/OUTPT VISIT, EST, LEVL V, 40-54 MIN: ICD-10-PCS | Mod: S$GLB,,, | Performed by: INTERNAL MEDICINE

## 2023-01-11 PROCEDURE — 99215 OFFICE O/P EST HI 40 MIN: CPT | Mod: S$GLB,,, | Performed by: INTERNAL MEDICINE

## 2023-01-11 PROCEDURE — 1159F PR MEDICATION LIST DOCUMENTED IN MEDICAL RECORD: ICD-10-PCS | Mod: CPTII,S$GLB,, | Performed by: INTERNAL MEDICINE

## 2023-01-11 PROCEDURE — 1111F PR DISCHARGE MEDS RECONCILED W/ CURRENT OUTPATIENT MED LIST: ICD-10-PCS | Mod: CPTII,S$GLB,, | Performed by: INTERNAL MEDICINE

## 2023-01-11 PROCEDURE — 99999 PR PBB SHADOW E&M-EST. PATIENT-LVL IV: CPT | Mod: PBBFAC,,, | Performed by: INTERNAL MEDICINE

## 2023-01-11 RX ORDER — AZITHROMYCIN 250 MG/1
TABLET, FILM COATED ORAL
Status: ON HOLD | COMMUNITY
Start: 2022-11-27 | End: 2023-01-13 | Stop reason: HOSPADM

## 2023-01-11 RX ORDER — FLUTICASONE PROPIONATE 50 MCG
1 SPRAY, SUSPENSION (ML) NASAL 2 TIMES DAILY PRN
Status: ON HOLD | COMMUNITY
Start: 2022-12-02 | End: 2023-01-13 | Stop reason: HOSPADM

## 2023-01-11 RX ORDER — BENZONATATE 100 MG/1
100 CAPSULE ORAL
Status: ON HOLD | COMMUNITY
Start: 2022-11-27 | End: 2023-01-13 | Stop reason: HOSPADM

## 2023-01-11 NOTE — PROGRESS NOTES
"Infectious Disease Clinic Note  01/11/2023       Subjective:       Patient ID: Jim Waddell is a 37 y.o. male being seen for an new visit.    Chief Complaint: Follow-up    HPI  36 yo male with no significant PMHX recently admitted for  RLE rapidly progressing cellulitis c/b abscesses presents for hospital follow-up.     Pt describes having noted a small scratch behind the knee - but not sure if he had prior to symptom onset or how he got it. He notes he presented with erythema at rt medial thigh that rapidly progressed and extended down the calf. He also experienced fevers, chills and night sweats prior to hospital admission. No pets or animal exposures. Only atypical water exposure reported was water at a ride in [place] prior to sx onset. CT 12/13 revealed "rim enhancing complex fluid collections along the posterior surface of the semimembranosus, medial gastrocnemius and lateral gastrocnemius...ill-defined low-attenuation fluid with surrounding inflammatory change at the level of the popliteal fossa," and "Cellulitis along the posterior thigh and posterior lower leg." Gen sx and IR were consulted, but ultimately its was decided upon medical management. He was discharged on clinca, doxy and cefpodoxime. Notes erythema and edema have significantly improved and denies systemic signs or symptoms of infection.     Repeat CT Rt leg on 1/3 revealed resolved abscesses "with residual soft tissue stranding overlying the superficial surface of the semimembranosus, medial, and lateral gastrocnemius muscles," improved cellulitis and new fluid collection/ abscess at popliteal fossa measuring  1.8 x 2.5 x 3.3 cm. He does note a tender knot behind the knee, but has complete ROM of knee joint. Script refill sent on 1/6 for linezolid, clinda and cefpodoxime x 7 days which he has been taking and tolerating well.               Family History   Problem Relation Age of Onset    No Known Problems Mother     No Known " Problems Father     Melanoma Neg Hx      Social History     Socioeconomic History    Marital status:    Tobacco Use    Smoking status: Never    Smokeless tobacco: Never   Substance and Sexual Activity    Alcohol use: Yes     Comment: socially    Drug use: No    Sexual activity: Yes     Partners: Female     Social Determinants of Health     Social Connections: Unknown    Marital Status:      Past Surgical History:   Procedure Laterality Date    APPENDECTOMY      COLONOSCOPY N/A 3/25/2021    Procedure: COLONOSCOPY suprep;  Surgeon: Asad Powers MD;  Location: [facility];  Service: Endoscopy;  Laterality: N/A;    INGUINAL HERNIA REPAIR  2018       Patient's Medications   New Prescriptions    No medications on file   Previous Medications    ALBUTEROL (PROVENTIL/VENTOLIN HFA) 90 MCG/ACTUATION INHALER    Inhale 2 puffs into the lungs every 6 (six) hours as needed for Wheezing or Shortness of Breath. Rescue    ASPIRIN (ECOTRIN) 81 MG EC TABLET    Take 81 mg by mouth once.    AZITHROMYCIN (Z-SREEKANTH) 250 MG TABLET    TAKE 2 TABLETS BY MOUTH TODAY, THEN TAKE 1 TABLET DAILY FOR 4 DAYS    BENZONATATE (TESSALON) 100 MG CAPSULE    Take 100 mg by mouth.    BETAMETHASONE VALERATE 0.1% (VALISONE) 0.1 % LOTN    AAA of scalp daily-bid prn scaling/itching.    CEFPODOXIME (VANTIN) 200 MG TABLET    Take 2 tablets (400 mg total) by mouth every 12 (twelve) hours. for 7 days    CICLOPIROX 1 % SHAMPOO    LATHER SCALP FOR 5-10 MINUTES, THEN RINSE. USE ONCE TO TWICE WEEKLY    CLINDAMYCIN (CLEOCIN) 150 MG CAPSULE    Take 3 capsules (450 mg total) by mouth every 8 (eight) hours. for 7 days    FLUOCINONIDE 0.05% (LIDEX) 0.05 % CREAM    If this was being applied to the area of infection on your leg, stop using it.    FLUTICASONE PROPIONATE (FLONASE) 50 MCG/ACTUATION NASAL SPRAY    1 spray by Each Nostril route 2 (two) times daily as needed for Allergies.    FLUTICASONE PROPIONATE (FLONASE) 50 MCG/ACTUATION NASAL SPRAY    1  "spray by Nasal route 2 (two) times daily as needed.    IBUPROFEN (ADVIL,MOTRIN) 200 MG TABLET    Take 400-600 mg by mouth every 6 (six) hours as needed for Pain (fever).    LINEZOLID (ZYVOX) 600 MG TAB    Take 1 tablet (600 mg total) by mouth every 12 (twelve) hours. for 7 days    OXYCODONE (ROXICODONE) 10 MG TAB IMMEDIATE RELEASE TABLET    Take 1 tablet (10 mg total) by mouth every 6 (six) hours as needed for Pain.    PROMETHAZINE-DEXTROMETHORPHAN (PROMETHAZINE-DM) 6.25-15 MG/5 ML SYRP    Take 5 mLs by mouth every 6 (six) hours as needed (cough).    PSEUDOEPHEDRINE (SUDAFED) 30 MG TABLET    Take 30 mg by mouth every 6 (six) hours as needed for Congestion.   Modified Medications    No medications on file   Discontinued Medications    No medications on file       Patient Active Problem List    Diagnosis Date Noted    History of penicillin allergy 12/15/2022    Pyomyositis 12/12/2022    Cellulitis of right lower extremity 12/06/2022    Leukocytosis 12/06/2022    Diarrhea 03/25/2021       Review of Systems   Review of Systems   Constitutional:  Negative for chills, fever and malaise/fatigue.   Eyes:  Negative for blurred vision and double vision.   Respiratory:  Negative for cough and shortness of breath.    Cardiovascular:  Negative for chest pain.   Gastrointestinal:  Negative for abdominal pain, diarrhea and vomiting.   Genitourinary:  Negative for dysuria and urgency.   Musculoskeletal:  Negative for joint pain, myalgias and neck pain.   Neurological:  Negative for weakness.         Objective:      /70 (BP Location: Left arm)   Pulse 65   Temp 97.7 °F (36.5 °C) (Oral)   Ht 5' 9" (1.753 m)   Wt 102.6 kg (226 lb 3.1 oz)   BMI 33.40 kg/m²   Estimated body mass index is 33.4 kg/m² as calculated from the following:    Height as of this encounter: 5' 9" (1.753 m).    Weight as of this encounter: 102.6 kg (226 lb 3.1 oz).    Physical Exam  Vitals reviewed.   Constitutional:       Appearance: Normal appearance. "   HENT:      Head: Normocephalic and atraumatic.      Nose: Nose normal.   Eyes:      Conjunctiva/sclera: Conjunctivae normal.      Pupils: Pupils are equal, round, and reactive to light.   Cardiovascular:      Rate and Rhythm: Normal rate and regular rhythm.   Pulmonary:      Effort: Pulmonary effort is normal.      Breath sounds: Normal breath sounds.   Abdominal:      General: Abdomen is flat. There is no distension.      Palpations: Abdomen is soft.      Tenderness: There is no abdominal tenderness.   Musculoskeletal:      Comments: RLE- calf is somewhat edematous with induration. Skin discoloration present on posterior aspect. Fluctuant mass at popliteal fossa. No skin breakdown.    Neurological:      General: No focal deficit present.      Mental Status: He is alert and oriented to person, place, and time.   Psychiatric:         Mood and Affect: Mood normal.         Behavior: Behavior normal.             Assessment:         1. Cellulitis of right lower extremity              Plan:       Jim was seen today for follow-up.    Diagnoses and all orders for this visit:    Cellulitis of right lower extremity  recently admitted for RLE rapidly progressing cellulitis c/b abscesses presents for hospital follow-up. Sent home on 2 wks doxy, clinda and cefpdoxime with continued improvement. CT leg revealed resolution of abscess overlying the semimembranosus, medial, and lateral gastrocnemius muscles, but did show new organized fluid collection and rt popliteal fossa measuring 1.8 x 2.5 x 3.3 cm.  --abx extended for an additional week on 1/6/- currently on day 6/7 of linezolid, clinda and cefpodoxime; continue  --scheduled for IR drainage of abscess on 1/13/23; please send for aerobic/ anaerobic cx and gram stain  --if unable to aspirate fluid collection will discuss extending abx duration until complete resolution of abscess.  --return precautions provided    Dona Ponce MD  Infectious Disease   Total  professional time spent for the encounter: 40 minutes  Time was spent preparing to see the patient, reviewing results of prior testing, obtaining and/or reviewing separately obtained history, performing a medically appropriate examination and interview, counseling and educating the patient/family, ordering medications/tests/procedures, referring and communicating with other health care professionals, documenting clinical information in the electronic health record, and independently interpreting results.

## 2023-01-13 ENCOUNTER — HOSPITAL ENCOUNTER (OUTPATIENT)
Facility: OTHER | Age: 38
Discharge: HOME | End: 2023-01-13
Attending: RADIOLOGY | Admitting: RADIOLOGY
Payer: COMMERCIAL

## 2023-01-13 ENCOUNTER — PATIENT MESSAGE (OUTPATIENT)
Dept: INFECTIOUS DISEASES | Facility: CLINIC | Age: 38
End: 2023-01-13
Payer: COMMERCIAL

## 2023-01-13 VITALS
SYSTOLIC BLOOD PRESSURE: 119 MMHG | RESPIRATION RATE: 16 BRPM | TEMPERATURE: 98 F | OXYGEN SATURATION: 100 % | HEART RATE: 59 BPM | DIASTOLIC BLOOD PRESSURE: 69 MMHG

## 2023-01-13 DIAGNOSIS — L02.419 ABSCESS OF POPLITEAL REGION: ICD-10-CM

## 2023-01-13 NOTE — PLAN OF CARE
Jim Waddell has met all discharge criteria from Phase II. Vital Signs are stable, ambulating  without difficulty. Discharge instructions given, patient verbalized understanding. Discharged from facility via ambulation due to no sedation in stable condition.

## 2023-01-17 ENCOUNTER — OFFICE VISIT (OUTPATIENT)
Dept: ALLERGY | Facility: CLINIC | Age: 38
End: 2023-01-17
Payer: COMMERCIAL

## 2023-01-17 VITALS
HEART RATE: 75 BPM | SYSTOLIC BLOOD PRESSURE: 110 MMHG | BODY MASS INDEX: 33.76 KG/M2 | OXYGEN SATURATION: 97 % | DIASTOLIC BLOOD PRESSURE: 78 MMHG | WEIGHT: 227.94 LBS | HEIGHT: 69 IN

## 2023-01-17 DIAGNOSIS — Z88.0 ALLERGY STATUS TO PENICILLIN: Primary | ICD-10-CM

## 2023-01-17 PROCEDURE — 1160F PR REVIEW ALL MEDS BY PRESCRIBER/CLIN PHARMACIST DOCUMENTED: ICD-10-PCS | Mod: CPTII,S$GLB,, | Performed by: STUDENT IN AN ORGANIZED HEALTH CARE EDUCATION/TRAINING PROGRAM

## 2023-01-17 PROCEDURE — 3074F SYST BP LT 130 MM HG: CPT | Mod: CPTII,S$GLB,, | Performed by: STUDENT IN AN ORGANIZED HEALTH CARE EDUCATION/TRAINING PROGRAM

## 2023-01-17 PROCEDURE — 3074F PR MOST RECENT SYSTOLIC BLOOD PRESSURE < 130 MM HG: ICD-10-PCS | Mod: CPTII,S$GLB,, | Performed by: STUDENT IN AN ORGANIZED HEALTH CARE EDUCATION/TRAINING PROGRAM

## 2023-01-17 PROCEDURE — 95076 INGEST CHALLENGE INI 120 MIN: CPT | Mod: S$GLB,,, | Performed by: STUDENT IN AN ORGANIZED HEALTH CARE EDUCATION/TRAINING PROGRAM

## 2023-01-17 PROCEDURE — 3008F BODY MASS INDEX DOCD: CPT | Mod: CPTII,S$GLB,, | Performed by: STUDENT IN AN ORGANIZED HEALTH CARE EDUCATION/TRAINING PROGRAM

## 2023-01-17 PROCEDURE — 3078F PR MOST RECENT DIASTOLIC BLOOD PRESSURE < 80 MM HG: ICD-10-PCS | Mod: CPTII,S$GLB,, | Performed by: STUDENT IN AN ORGANIZED HEALTH CARE EDUCATION/TRAINING PROGRAM

## 2023-01-17 PROCEDURE — 1159F PR MEDICATION LIST DOCUMENTED IN MEDICAL RECORD: ICD-10-PCS | Mod: CPTII,S$GLB,, | Performed by: STUDENT IN AN ORGANIZED HEALTH CARE EDUCATION/TRAINING PROGRAM

## 2023-01-17 PROCEDURE — 99202 OFFICE O/P NEW SF 15 MIN: CPT | Mod: 25,S$GLB,, | Performed by: STUDENT IN AN ORGANIZED HEALTH CARE EDUCATION/TRAINING PROGRAM

## 2023-01-17 PROCEDURE — 95076 PR INGESTION CHALLENGE TEST; INITIAL 120 MIN: ICD-10-PCS | Mod: S$GLB,,, | Performed by: STUDENT IN AN ORGANIZED HEALTH CARE EDUCATION/TRAINING PROGRAM

## 2023-01-17 PROCEDURE — 3078F DIAST BP <80 MM HG: CPT | Mod: CPTII,S$GLB,, | Performed by: STUDENT IN AN ORGANIZED HEALTH CARE EDUCATION/TRAINING PROGRAM

## 2023-01-17 PROCEDURE — 99999 PR PBB SHADOW E&M-EST. PATIENT-LVL IV: CPT | Mod: PBBFAC,,, | Performed by: STUDENT IN AN ORGANIZED HEALTH CARE EDUCATION/TRAINING PROGRAM

## 2023-01-17 PROCEDURE — 1159F MED LIST DOCD IN RCRD: CPT | Mod: CPTII,S$GLB,, | Performed by: STUDENT IN AN ORGANIZED HEALTH CARE EDUCATION/TRAINING PROGRAM

## 2023-01-17 PROCEDURE — 99202 PR OFFICE/OUTPT VISIT, NEW, LEVL II, 15-29 MIN: ICD-10-PCS | Mod: 25,S$GLB,, | Performed by: STUDENT IN AN ORGANIZED HEALTH CARE EDUCATION/TRAINING PROGRAM

## 2023-01-17 PROCEDURE — 1160F RVW MEDS BY RX/DR IN RCRD: CPT | Mod: CPTII,S$GLB,, | Performed by: STUDENT IN AN ORGANIZED HEALTH CARE EDUCATION/TRAINING PROGRAM

## 2023-01-17 PROCEDURE — 3008F PR BODY MASS INDEX (BMI) DOCUMENTED: ICD-10-PCS | Mod: CPTII,S$GLB,, | Performed by: STUDENT IN AN ORGANIZED HEALTH CARE EDUCATION/TRAINING PROGRAM

## 2023-01-17 PROCEDURE — 99999 PR PBB SHADOW E&M-EST. PATIENT-LVL IV: ICD-10-PCS | Mod: PBBFAC,,, | Performed by: STUDENT IN AN ORGANIZED HEALTH CARE EDUCATION/TRAINING PROGRAM

## 2023-01-17 NOTE — PROGRESS NOTES
"Allergy Clinic Note  [facility]    This note was created by combination of typed  and M-Modal dictation. Transcription errors may be present.  If there are any questions, please contact me.    Subjective:      Patient ID: Jim Waddell is a 37 y.o. male.    Chief Complaint: Other (Unknown reaction to penicillin 30 + years)      Referring Provider: Self, Aaareferral    History of Present Illness: Jim Waddell is a 37 y.o. male presents for clarification of penicillin allergy.    Related medications and other interventions  Albuterol inhaler  Flonase  Sudafed  Promethazine DM    1/17/2023:  At initial visit, client reported an unknown reaction to an unknown penicillin about 30 years ago.  He does not know details but does not think it was severe.  Earlier this month, he took cepodoxime (Vantin, 3rd generation) without difficulty (verified in Epic med Hx).    He is otherwise healthy.        MEDICAL HISTORY      Significant past medical history:  None  Active Problem List reviewed  ENT surgery:  No   Smoking Hx:  Client  reports that he has never smoked. He has never been exposed to tobacco smoke. He has never used smokeless tobacco.    Meds:  MAR reviewed    Asthma: yes, mild and intermit.  Drug allergy/intolerance:  Labeled penicillin allergic.  See HPI  Venom allergy: No  Latex allergy:  No      REVIEW OF SYSTEMS      CONST: no F/C/NS, no unintentional weight changes  NEURO:  no tremor, no weakness  EYES: no discharge, no pruritus, no erythema  EARS: no hearing loss, no sensation of fullness  NOSE: no congestion, no rhinorrhea, no sneezing  PULM:  no SOB, no wheezing, no cough  CV: no CP, no palpitations, no leg swelling  GI:  no abdominal pain, no blood in stool  :  no dysuria, no hematuria  DERM: no rashes, no skin breaks    Objective:     Physical Exam:     /78 (BP Location: Left arm, Patient Position: Sitting, BP Method: Large (Automatic))   Pulse 75   Ht 5' 9.02" " (1.753 m)   Wt 103.4 kg (227 lb 15.3 oz)   SpO2 97%   BMI 33.65 kg/m²   GEN: Awake and alert, no distress  DERM: No flushing, no rashes  EYE:  No occular discharge, no redness  HEENT: No nasal discharge, no hoarseness  PULM: Normal work of breathing, no cough, CTA  COR:  RRR, normal pulses  NEURO:  No focal deficit, speech fluent and logical  PSYCH: appropriate affect, normal behavior        Allergy Testing     Oral challenge to amoxacillin 500mg (1/17/2023)   Indication:  remote, vague Hx PCN allergy        Lab results      EO count 200, 2022      Imaging and other diagnostics            Medical records review          Medical Decision-Making       Diagnoses:     Jim Waddell is a 37 y.o. male. with  1. Allergy status to penicillin -- not allergic          Plan:   Client tolerated 500 mg of amoxicillin p.o. this morning with no symptoms during 60 minute observation.  This means he is not at any increased risk for anaphylaxis or other immediate IgE mediated reaction.  He may still have other types of adverse reaction to penicillin, such as drug fever, but he is not at any increased risk over the normal population.  No restrictions on any antibiotics or other drugs.      Allergy status to penicillin -- not allergic    Removed PCN for drug allergy list      Patient Instructions and follow up     Patient Instructions     You are not allergic to penicillin or any related medications ('cillins, cephalosporins)    No restriction on any antibiotics or other drugs.    Follow-up if needed        Sara Paris MD  Allergy, Asthma & Immunology

## 2023-01-17 NOTE — PATIENT INSTRUCTIONS
You are not allergic to penicillin or any related medications ('cillins, cephalosporins)    No restriction on any antibiotics or other drugs.

## 2023-04-18 ENCOUNTER — OFFICE VISIT (OUTPATIENT)
Dept: URGENT CARE | Facility: CLINIC | Age: 38
End: 2023-04-18
Payer: COMMERCIAL

## 2023-04-18 VITALS
OXYGEN SATURATION: 98 % | WEIGHT: 227 LBS | RESPIRATION RATE: 18 BRPM | BODY MASS INDEX: 33.62 KG/M2 | HEIGHT: 69 IN | SYSTOLIC BLOOD PRESSURE: 119 MMHG | TEMPERATURE: 98 F | DIASTOLIC BLOOD PRESSURE: 70 MMHG | HEART RATE: 77 BPM

## 2023-04-18 DIAGNOSIS — R22.41 LOCALIZED SWELLING OF RIGHT LOWER LEG: Primary | ICD-10-CM

## 2023-04-18 PROCEDURE — 99214 PR OFFICE/OUTPT VISIT, EST, LEVL IV, 30-39 MIN: ICD-10-PCS | Mod: S$GLB,,, | Performed by: FAMILY MEDICINE

## 2023-04-18 PROCEDURE — 99214 OFFICE O/P EST MOD 30 MIN: CPT | Mod: S$GLB,,, | Performed by: FAMILY MEDICINE

## 2023-04-18 NOTE — PROGRESS NOTES
"Subjective:      Patient ID: Jim Waddell is a 38 y.o. male.    Vitals:  height is 5' 9" (1.753 m) and weight is 103 kg (227 lb). His oral temperature is 98 °F (36.7 °C). His blood pressure is 119/70 and his pulse is 77. His respiration is 18 and oxygen saturation is 98%.     Chief Complaint: Leg Swelling (Right leg swelling)    This is a 38 y.o. male who presents today with a chief complaint of  right leg swelling x today. Pt state he was hospitalize dec 2022 for bacterial infection and was wonder if it came back. Pt says that he did twist his knee and had swelling there before the swelling in his leg started.     Edema  This is a recurrent problem. The current episode started today. The problem occurs constantly. Pertinent negatives include no abdominal pain, anorexia, arthralgias, change in bowel habit, chest pain, chills, congestion, coughing, diaphoresis, fatigue, fever, headaches, joint swelling, myalgias, nausea, neck pain, numbness, rash, sore throat, swollen glands, urinary symptoms, vertigo, visual change, vomiting or weakness. Nothing aggravates the symptoms. He has tried nothing for the symptoms. The treatment provided no relief.   Constitution: Negative for chills, sweating, fatigue and fever.   HENT:  Negative for congestion and sore throat.    Neck: Negative for neck pain.   Cardiovascular:  Negative for chest pain.   Respiratory:  Negative for cough.    Gastrointestinal:  Negative for abdominal pain, nausea and vomiting.   Musculoskeletal:  Negative for joint pain, joint swelling and muscle ache.   Skin:  Negative for rash.   Neurological:  Negative for history of vertigo, headaches and numbness.    Objective:     Physical Exam   Constitutional: He is oriented to person, place, and time. He appears well-developed. He is cooperative.   HENT:   Head: Normocephalic and atraumatic.   Ears:   Right Ear: Hearing, tympanic membrane, external ear and ear canal normal.   Left Ear: Hearing, tympanic " membrane, external ear and ear canal normal.   Nose: Nose normal. No mucosal edema or nasal deformity. No epistaxis. Right sinus exhibits no maxillary sinus tenderness and no frontal sinus tenderness. Left sinus exhibits no maxillary sinus tenderness and no frontal sinus tenderness.   Mouth/Throat: Uvula is midline, oropharynx is clear and moist and mucous membranes are normal. No trismus in the jaw. Normal dentition. No uvula swelling.   Eyes: Conjunctivae and lids are normal.   Neck: Trachea normal and phonation normal. Neck supple.   Cardiovascular: Normal rate, regular rhythm, normal heart sounds and normal pulses.   Pulmonary/Chest: Effort normal and breath sounds normal.   Abdominal: Normal appearance and bowel sounds are normal. Soft.   Musculoskeletal: Normal range of motion.         General: Normal range of motion.      Right lower leg: He exhibits swelling. He exhibits no deformity and no laceration. 1+ Edema present.      Left lower leg: He exhibits no deformity.   Neurological: He is alert and oriented to person, place, and time. He exhibits normal muscle tone.   Skin: Skin is warm, dry and intact. not right lower leg  Psychiatric: His speech is normal and behavior is normal. Judgment and thought content normal.   Nursing note and vitals reviewed.    Assessment:     1. Localized swelling of right lower leg        Plan:       Localized swelling of right lower leg    I have reviewed the patients' previous visits, labs and images to look for any trends or previous treatments.         Medical Decision Making:   History:   Old Records Summarized: other records.       <> Summary of Records: Pt had a hospitalization for abscess that ended up not being drained but did cause significant amount of swelling in the leg which did eventually subside.

## 2023-12-08 ENCOUNTER — TELEPHONE (OUTPATIENT)
Dept: DERMATOLOGY | Facility: CLINIC | Age: 38
End: 2023-12-08
Payer: COMMERCIAL

## 2023-12-08 NOTE — TELEPHONE ENCOUNTER
Pt needs appt 1st for refill----- Message from Jael Bloom sent at 12/8/2023 12:05 PM CST -----  Regarding: pt adivce  Contact: [phone]  Pt calling in refill for medication ciclopirox 1 % shampoo. Pls call         [id number] - [place], [place] - [address] AT [facility]

## 2023-12-14 ENCOUNTER — OFFICE VISIT (OUTPATIENT)
Dept: DERMATOLOGY | Facility: CLINIC | Age: 38
End: 2023-12-14

## 2023-12-14 DIAGNOSIS — D22.9 MULTIPLE BENIGN NEVI: ICD-10-CM

## 2023-12-14 DIAGNOSIS — D23.9 DERMATOFIBROMA: ICD-10-CM

## 2023-12-14 DIAGNOSIS — Q82.8 KERATODERMA: ICD-10-CM

## 2023-12-14 DIAGNOSIS — L73.8 SEBACEOUS HYPERPLASIA: ICD-10-CM

## 2023-12-14 DIAGNOSIS — L81.4 LENTIGINES: ICD-10-CM

## 2023-12-14 DIAGNOSIS — L21.9 SEBORRHEIC DERMATITIS: Primary | ICD-10-CM

## 2023-12-14 PROCEDURE — 99214 PR OFFICE/OUTPT VISIT, EST, LEVL IV, 30-39 MIN: ICD-10-PCS | Mod: S$GLB,,, | Performed by: DERMATOLOGY

## 2023-12-14 PROCEDURE — 99214 OFFICE O/P EST MOD 30 MIN: CPT | Mod: S$GLB,,, | Performed by: DERMATOLOGY

## 2023-12-14 RX ORDER — BETAMETHASONE VALERATE 0.1 %
LOTION (ML) TOPICAL
Qty: 60 ML | Refills: 5 | Status: SHIPPED | OUTPATIENT
Start: 2023-12-14

## 2023-12-14 RX ORDER — CICLOPIROX 1 G/100ML
SHAMPOO TOPICAL
Qty: 240 ML | Refills: 11 | Status: SHIPPED | OUTPATIENT
Start: 2023-12-14

## 2023-12-14 NOTE — PROGRESS NOTES
Patient Information  Name: Jim Waddell  : 1985  MRN: [id number]     Referring Physician:  No ref. provider found   Primary Care Physician:  Mauricio Lomeli MD   Date of Visit: 2023      Subjective:     History of Present lllness:    Jim Waddell is a 38 y.o. male who presents with a chief complaint of moles and seb derm.    Seb derm  Location: scalp  Signs/Symptoms: itching scales  Relieving factors/Prior treatments: ciclopirox shampoo, betamethasone    Hyperkeratosis  Location: foot  Signs/Symptoms: dry  Relieving factors/Prior treatments: urea     Patient was last seen: 3/30/2021.  Prior notes by myself reviewed.   Clinical documentation obtained by nursing staff reviewed.    Review of Systems    Objective:   Physical Exam   Constitutional: He appears well-developed and well-nourished. No distress.   Neurological: He is alert and oriented to person, place, and time. He is not disoriented.   Psychiatric: He has a normal mood and affect.   Skin:   Areas Examined (abnormalities noted in diagram):   Scalp / Hair Palpated and Inspected  Head / Face Inspection Performed  Neck Inspection Performed  Chest / Axilla Inspection Performed  Abdomen Inspection Performed  Back Inspection Performed  RUE Inspected  LUE Inspection Performed                 Diagram Legend     Erythematous scaling macule/papule c/w actinic keratosis       Vascular papule c/w angioma      Pigmented verrucoid papule/plaque c/w seborrheic keratosis      Yellow umbilicated papule c/w sebaceous hyperplasia      Irregularly shaped tan macule c/w lentigo     1-2 mm smooth white papules consistent with Milia      Movable subcutaneous cyst with punctum c/w epidermal inclusion cyst      Subcutaneous movable cyst c/w pilar cyst      Firm pink to brown papule c/w dermatofibroma      Pedunculated fleshy papule(s) c/w skin tag(s)      Evenly pigmented macule c/w junctional nevus     Mildly variegated pigmented, slightly  irregular-bordered macule c/w mildly atypical nevus      Flesh colored to evenly pigmented papule c/w intradermal nevus       Pink pearly papule/plaque c/w basal cell carcinoma      Erythematous hyperkeratotic cursted plaque c/w SCC      Surgical scar with no sign of skin cancer recurrence      Open and closed comedones      Inflammatory papules and pustules      Verrucoid papule consistent consistent with wart     Erythematous eczematous patches and plaques     Dystrophic onycholytic nail with subungual debris c/w onychomycosis     Umbilicated papule    Erythematous-base heme-crusted tan verrucoid plaque consistent with inflamed seborrheic keratosis     Erythematous Silvery Scaling Plaque c/w Psoriasis     See annotation    No images are attached to the encounter or orders placed in the encounter.      [] Data reviewed  [] Prior external notes reviewed  [] Independent review of test  [] Management discussed with another provider  [] Independent historian    Assessment / Plan:        Seborrheic dermatitis   - stable and chronic  -     ciclopirox 1 % shampoo; LATHER SCALP FOR 5-10 MINUTES, THEN RINSE. USE ONCE TO TWICE WEEKLY  Dispense: 240 mL; Refill: 11  -     betamethasone valerate 0.1% (VALISONE) 0.1 % Lotn; AAA of scalp daily-bid prn scaling/itching.  Dispense: 60 mL; Refill: 5    Keratoderma   - stable and chronic  Recommended a urea-based cream to feet at bedtime. Examples: Clear 40 urea gel, Flexitol Heel Balm, Eucerin Roughness Relief Spot Treatment    Sebaceous hyperplasia  This is a common condition representing benign enlargement of oil glands. It typically occurs in adulthood. Reassurance was given to the patient. No treatment required.  Discussed with the patient that treatment of these benign lesions is not covered by insurance as it is considered cosmetic. Warned about risk of scarring and hypo- or hyperpigmentation with treatment, as well as risk of recurrence and/or development of more  lesions.    Multiple benign nevi  Multiple benign-appearing nevi present on exam today. Reassurance provided. Counseled patient to periodically examine moles and return to clinic if any changes in size, shape, or color are noted or if it becomes symptomatic (bleeding, itching, pain, etc).  Recommend using a broad-spectrum, water-resistant sunscreen with SPF of 30 or higher--reapply every 2 hours. Seek shade, wear sun-protective clothing, and perform regular skin self-exams.    Lentigines  These are benign sun spots which should be monitored for changes. Daily sun protection will reduce the number of new lesions.   Recommend using a broad-spectrum, water-resistant sunscreen with SPF of 30 or higher--reapply every 2 hours. Seek shade, wear sun-protective clothing, and perform regular skin self-exams.    Dermatofibroma  These growths are benign bundles of scar tissue that can arise spontaneously or from minor trauma, such as a bug bite or a shaving nick. They are commonly found on the lower legs, arms above the elbows, and trunk.   Removal is not recommended as the lesion is just replaced with an additional scar; however, if it is symptomatic, removal can be considered. Lesions can also recur following removal.      Follow up in about 1 year (around 12/14/2024) for follow up, or sooner if symptoms worsening or not improving.      Clari Robertson MD, [facility]  [facility]

## 2024-05-23 ENCOUNTER — OFFICE VISIT (OUTPATIENT)
Dept: UROLOGY | Facility: CLINIC | Age: 39
End: 2024-05-23
Payer: COMMERCIAL

## 2024-05-23 VITALS
HEIGHT: 69 IN | WEIGHT: 235 LBS | BODY MASS INDEX: 34.8 KG/M2 | HEART RATE: 78 BPM | SYSTOLIC BLOOD PRESSURE: 95 MMHG | DIASTOLIC BLOOD PRESSURE: 72 MMHG

## 2024-05-23 DIAGNOSIS — Z30.2 ENCOUNTER FOR MALE STERILIZATION PROCEDURE: Primary | ICD-10-CM

## 2024-05-23 PROBLEM — R19.7 DIARRHEA: Status: RESOLVED | Noted: 2021-03-25 | Resolved: 2024-05-23

## 2024-05-23 PROBLEM — L03.115 CELLULITIS OF RIGHT LOWER EXTREMITY: Status: RESOLVED | Noted: 2022-12-06 | Resolved: 2024-05-23

## 2024-05-23 PROBLEM — M60.009 PYOMYOSITIS: Status: RESOLVED | Noted: 2022-12-12 | Resolved: 2024-05-23

## 2024-05-23 PROBLEM — D72.829 LEUKOCYTOSIS: Status: RESOLVED | Noted: 2022-12-06 | Resolved: 2024-05-23

## 2024-05-23 PROBLEM — Z88.0 HISTORY OF PENICILLIN ALLERGY: Status: RESOLVED | Noted: 2022-12-15 | Resolved: 2024-05-23

## 2024-05-23 PROCEDURE — 3008F BODY MASS INDEX DOCD: CPT | Mod: CPTII,S$GLB,, | Performed by: UROLOGY

## 2024-05-23 PROCEDURE — 3074F SYST BP LT 130 MM HG: CPT | Mod: CPTII,S$GLB,, | Performed by: UROLOGY

## 2024-05-23 PROCEDURE — 1160F RVW MEDS BY RX/DR IN RCRD: CPT | Mod: CPTII,S$GLB,, | Performed by: UROLOGY

## 2024-05-23 PROCEDURE — 99204 OFFICE O/P NEW MOD 45 MIN: CPT | Mod: S$GLB,,, | Performed by: UROLOGY

## 2024-05-23 PROCEDURE — 1159F MED LIST DOCD IN RCRD: CPT | Mod: CPTII,S$GLB,, | Performed by: UROLOGY

## 2024-05-23 PROCEDURE — 99999 PR PBB SHADOW E&M-EST. PATIENT-LVL IV: CPT | Mod: PBBFAC,,, | Performed by: UROLOGY

## 2024-05-23 PROCEDURE — 3078F DIAST BP <80 MM HG: CPT | Mod: CPTII,S$GLB,, | Performed by: UROLOGY

## 2024-05-23 RX ORDER — LIDOCAINE HYDROCHLORIDE 10 MG/ML
20 INJECTION INFILTRATION; PERINEURAL ONCE
Status: SHIPPED | OUTPATIENT
Start: 2024-06-22

## 2024-05-23 NOTE — PATIENT INSTRUCTIONS
Having a Vasectomy: Before, During, and After the Procedure  Vasectomy is an outpatient (same day) procedure. It can be done in a doctors office, clinic, or hospital. Your doctor will talk with you about preparing for surgery. He or she will also discuss the possible risks and complications with you. After the procedure, follow all your doctors advice for recovery.  Preparing for Surgery      The cut ends of the vas may be tied, closed with a clip, or sealed by heat (cauterized).    Your doctor will talk with you about getting ready for surgery. You may be asked to do the following:  Sign a consent form. This must be done at least a few days before surgery. It gives your doctor permission to do the procedure. It also states that a vasectomy is not guaranteed to make you sterile.  Dont take aspirin, ibuprofen, or naproxen for 1 week before surgery or 1 week after. These medications can cause bleeding after the procedure. Also, tell your doctor if you take any medications, supplements, or herbal remedies.  Tell your doctor if youve had any prior scrotal surgery.  Arrange for an adult family member or friend to give you a ride home after surgery.  Shower and clean your scrotum the day of surgery. Your doctor may also ask you to shave your scrotum.  Bring an athletic supporter (jockstrap) and a pair of loose fitting pants to the doctors office or hospital.  Eat something with sugar before surgery.  During Surgery  The entire procedure usually lasts less than 30 minutes.  Youll be asked to undress and lie on a table.  To prevent pain during surgery, youll be given an injection of local anesthetic in your scrotum or lower groin.  Once the area is numb, one or two small incisions are made in the scrotum.   The vas deferens are lifted through the incision and cut. The ends of the vas are then sealed off using one of several methods.  If needed, the incision is closed with stitches.  You can rest for a while until  youre ready to go home.  Recovering at Home  For about a week, your scrotum may look bruised and slightly swollen. You may also have a small amount of bloody discharge from the incision. This is normal.  To help make your recovery more comfortable, follow the tips below.  Stay off your feet as much as possible for the first 2 days.   Wear an athletic supporter or snug cotton briefs for support.  Ice the area for 24 hours  Take medications with acetaminophen (such as Tylenol) to relieve any discomfort. Dont use aspirin, ibuprofen, or naproxen.  Avoid heavy lifting, exercise, or intercourse for 7 days.  Remember: You must use another form of birth control until youre completely sterile.  Call your doctor if you notice any of the following after surgery:   Increasing pain or swelling in your scrotum  A large black-and-blue area, or a growing lump  Fever or chills  Increasing redness or drainage of the incision  Trouble urinating    Sex After Vasectomy  Vasectomy doesnt change your sexual function. So when you start having sex again, it should feel the same as before. A vasectomy also shouldnt affect your relationship with your partner. Its important to remember, though, that you wont become sterile right away. It will take time before you can have sex without the need for birth control.  Until youre sterile: After a vasectomy, some active sperm still remain in your semen. It will take time and many ejaculations before the sperm are completely gone. During this period, you must use another birth control method to prevent pregnancy. To make sure no sperm are left in your semen, youll need to have one or more semen exams. You usually collect a semen sample at home and bring it to a lab. The sample is then checked under a microscope. Youre sterile only when these samples show no evidence of sperm. Ask your doctor whether additional follow-up is needed.  After youre sterile: After your doctor tells you youre  sterile, you no longer need to use any form of birth control. Youre free to have sex without the fear of unwanted pregnancy. However, a vasectomy does not protect you from sexually transmitted diseases (STDs). If you have more than one sex partner, be sure to practice safer sex by using condoms.  © 0675-6566 [employer], [address] [place] [address], [place] [address]. All rights reserved. This information is not intended as a substitute for professional medical care. Always follow your healthcare professional's instructions.    Vasectomy: Risks and Complications  A vasectomy is an outpatient procedure. This means youll go home the same day. Its done in a doctors office, clinic, or hospital. Before your procedure, youll be asked to read and sign a consent form. This form states youre aware of the possible risks and complications. It also says that you understand that the procedure, though most often successful, cant promise to make you sterile. Be sure you have all your questions answered before you sign this form. Below is a list of risks and possible complications of the procedure.  Risks and Possible Complications of Vasectomy   Vasectomy is safe. But it does have risks. They include the following:  Bleeding or infection.  Sperm granuloma. This is a small, harmless lump. It may form where the vas deferens is sealed off.  Loss of Testicle  Epididymitis.This is inflammation that may cause scrotal aching. It often goes away without treatment. Anti-inflammatory medications can provide relief.  Reconnection of the vas deferens. This can occur in rare cases. It makes you fertile again. This can result in an unwanted pregnancy.  Sperm antibodies.These are a common response of the body to absorbed sperm. The antibodies can make you sterile. This is true even if you later try to reverse your vasectomy.  Long-term testicular discomfort.This may occur after surgery. But its very rare.    © 4798-5285 [employer],  [address] [place] [address]. All rights reserved. This information is not intended as a substitute for professional medical care. Always follow your healthcare professional's instructions.

## 2024-05-23 NOTE — PROGRESS NOTES
Chief Complaint:   Undesired fertility    HPI:  Mr. Waddell is a 39 y.o.  male who presents for evaluation re vasectomy. He has 2 children, ages 7 and 3 yo, and he is certain that he desires no more. He is aware of other forms of contraception.  He's currently using the pill for contraception. He is aware that vasectomy is to be considered permanent/irreversible.    He denies orchalgia.  No dysuria.  No hematuria.    ROS:  Jim Gregg Waddell denies headache, blurred vision, fever, nausea, vomiting, chills, abdominal pain, chest pain, sore throat, bleeding per rectum, cough, SOB, recent loss of consciousness, recent mental status changes, seizures, dizziness, or upper or lower extremity weakness.    Past Medical History    No past medical history on file.    Past Surgical History    Past Surgical History:   Procedure Laterality Date    APPENDECTOMY      COLONOSCOPY N/A 3/25/2021    Procedure: COLONOSCOPY suprep;  Surgeon: Asad Powers MD;  Location: [facility] ENDO;  Service: Endoscopy;  Laterality: N/A;    DRAINAGE Right 1/13/2023    Procedure: DRAINAGE-right popliteal aspiration;  Surgeon: Romulo Herron II, MD;  Location: [facility] CATH LAB;  Service: Interventional Radiology;  Laterality: Right;  Please send for aerobic culture    INGUINAL HERNIA REPAIR  2018    NEEDLE LOCALIZATION Right 1/13/2023    Procedure: NEEDLE LOCALIZATION;  Surgeon: Romulo Herron II, MD;  Location: [facility] CATH LAB;  Service: Interventional Radiology;  Laterality: Right;       Social History    Social History     Socioeconomic History    Marital status:    Tobacco Use    Smoking status: Never     Passive exposure: Never    Smokeless tobacco: Never   Substance and Sexual Activity    Alcohol use: Yes     Comment: socially    Drug use: No    Sexual activity: Yes     Partners: Female     Social Determinants of Health      Received from [facility]       Family History  Family History   Problem Relation Name Age of  Onset    Allergies Mother      Allergies Father      Melanoma Neg Hx      Angioedema Neg Hx      Atopy Neg Hx      Immunodeficiency Neg Hx      Urticaria Neg Hx      Rhinitis Neg Hx      Eczema Neg Hx      Asthma Neg Hx      Allergic rhinitis Neg Hx           Medications    Current Outpatient Medications:     betamethasone valerate 0.1% (VALISONE) 0.1 % Lotn, AAA of scalp daily-bid prn scaling/itching., Disp: 60 mL, Rfl: 5    ciclopirox 1 % shampoo, LATHER SCALP FOR 5-10 MINUTES, THEN RINSE. USE ONCE TO TWICE WEEKLY, Disp: 240 mL, Rfl: 11    fluticasone propionate (FLONASE) 50 mcg/actuation nasal spray, 1 spray by Each Nostril route 2 (two) times daily as needed for Allergies., Disp: , Rfl:     ibuprofen (ADVIL,MOTRIN) 200 MG tablet, Take 400-600 mg by mouth every 6 (six) hours as needed for Pain (fever)., Disp: , Rfl:     pseudoephedrine (SUDAFED) 30 MG tablet, Take 30 mg by mouth every 6 (six) hours as needed for Congestion., Disp: , Rfl:     Current Facility-Administered Medications:     acetaminophen tablet 650 mg, 650 mg, Oral, Once PRN, Jose Reich MD    albuterol inhaler 2 puff, 2 puff, Inhalation, Q20 Min PRN, Jose Reich MD    Allergies  Review of patient's allergies indicates:  No Known Allergies      ?  PHYSICAL EXAM:    The patient generally appears in good health, is appropriately interactive, and is in no apparent distress.     Eyes: anicteric sclerae, moist conjunctivae; no lid-lag; PERRLA     HENT: Atraumatic; oropharynx clear with moist mucous membranes and no mucosal ulcerations;normal hard and soft palate.  No evidence of lymphadenopathy.    Neck: Trachea midline.  No thyromegaly.    Skin: No lesions.    Mental: Cooperative with normal affect.  Is oriented to time, place, and person.    Neuro: Grossly intact.    Chest: Normal inspiratory effort.   No accessory muscles.  No audible wheezes.  Respirations symmetric on inspiration and expiration.    Heart: Regular rhythm.       Abdomen:  Soft, non-tender. No masses or organomegaly. Bladder is not palpable. No evidence of flank discomfort. No evidence of inguinal hernia.    Genitourinary: The penis has no evidence of plaques or induration. The urethral meatus is normal. The testes, epididymides, and cord structures are normal in size and contour bilaterally. The scrotum is normal in size and contour.    Extremities: No clubbing, cyanosis, or edema          A/P:  Jim Waddell is a 39 y.o. male who presents for evaluation re vasectomy.    1.I discussed with the patient risks and benefits, as well as alternatives. We discussed possible complications at length, including fever, infection, bleeding--possibly requiring reoperation,testicular injury or atrophy with loss of function, chronic pain, persistent or recurrent presence of sperm in the ejaculate, vasal recanalization, as well as the risks attendant to the anesthetic.  2.We discussed that he should stop aspirin, ibuprofen, and similar products at least one week prior to the procedure. Acetominophen is okay to use for headaches, pain, etc.  3. He should bring an athletic supporter and loose fitting sweat pants on the day of the procedure.  4. We discussed that he must continue to use contraception until two consecutive semen analyses (checked at approximately 4-6 weeks post-vasectomy) reveal azoospermia.  5. He will schedule an elective vasectomy.

## 2024-06-10 ENCOUNTER — PATIENT MESSAGE (OUTPATIENT)
Dept: INTERNAL MEDICINE | Facility: CLINIC | Age: 39
End: 2024-06-10
Payer: COMMERCIAL

## 2024-07-11 ENCOUNTER — TELEPHONE (OUTPATIENT)
Dept: UROLOGY | Facility: CLINIC | Age: 39
End: 2024-07-11
Payer: COMMERCIAL

## 2024-07-12 ENCOUNTER — TELEPHONE (OUTPATIENT)
Dept: UROLOGY | Facility: CLINIC | Age: 39
End: 2024-07-12
Payer: COMMERCIAL

## 2024-07-12 ENCOUNTER — PROCEDURE VISIT (OUTPATIENT)
Dept: UROLOGY | Facility: CLINIC | Age: 39
End: 2024-07-12
Payer: COMMERCIAL

## 2024-07-12 VITALS
HEIGHT: 69 IN | SYSTOLIC BLOOD PRESSURE: 120 MMHG | HEART RATE: 83 BPM | RESPIRATION RATE: 18 BRPM | BODY MASS INDEX: 36.51 KG/M2 | WEIGHT: 246.5 LBS | TEMPERATURE: 98 F | DIASTOLIC BLOOD PRESSURE: 75 MMHG

## 2024-07-12 DIAGNOSIS — Z30.2 ENCOUNTER FOR MALE STERILIZATION PROCEDURE: ICD-10-CM

## 2024-07-12 RX ORDER — OXYCODONE AND ACETAMINOPHEN 5; 325 MG/1; MG/1
1 TABLET ORAL EVERY 4 HOURS PRN
Qty: 8 TABLET | Refills: 0 | Status: SHIPPED | OUTPATIENT
Start: 2024-07-12 | End: 2024-07-22

## 2024-07-12 RX ORDER — CEPHALEXIN 500 MG/1
500 CAPSULE ORAL 4 TIMES DAILY
Qty: 8 CAPSULE | Refills: 0 | Status: SHIPPED | OUTPATIENT
Start: 2024-07-12 | End: 2024-07-14

## 2024-07-12 RX ADMIN — LIDOCAINE HYDROCHLORIDE 20 ML: 10 INJECTION INFILTRATION; PERINEURAL at 08:07

## 2024-07-12 NOTE — PATIENT INSTRUCTIONS
What to Expect After a Vasectomy  You cannot drive or operate heavy machinery on the day of the procedure.    Apply ice packs to the scrotal area for 24-48 hours. Avoid direct contact of the ice pack with the skin. Scrotal supports, jock straps, or fitted underwear help elevate the scrotum and reduce discomfort.    You may shower the next day. Gently apply soapy water to the scrotum to wash. Rinse and dry yourself by blotting the skin, not rubbing.    Avoid strenuous physical exercises or sexual relations for at least one week after a vasectomy.    Continue to use birth control for at least 6 weeks or 10-20 ejaculations. You are still considered fertile until your urologist examines a post-vasectomy semen analysis at 6 weeks and perhaps one at 8 weeks as well. Drop off the specimen at the Urology Department, 2nd floor, [facility] between 8am and 4pm.    Do NOT resume unprotected sexual activity until your physician finds no sperm in your semen.    All stitches will dissolve on their own in 1-2 weeks.    Signs and Symptoms to Report  A large amount of bleeding at the site  An unusual amount of pain  A large amount of swelling in the scrotum  Fever and chills  Any signs of infection, such as redness at the site or foul-smelling drainage    Risks  The risks of complication after vasectomy are very low. A few of the risks include:  Bleeding  Infection  Scrotal hematoma - a collection of blood in the scrotum  Inflammation of the epididymis - inflammation of a structure next to the testicle that helps in maturation of sperm  Sperm granuloma - a collection of sperm that leaks out from the vas deferens, forming a small nodule or lump. This does not usually cause any discomfort but you may feel it in the scrotum.  Recanalization - the restoration of the lumen or transport tube between the two ends of the vas deferens, possibly causing fertility    If you have any questions or concerns, please call your [facility]  urologist at [phone].

## 2024-07-12 NOTE — PROCEDURES
Date: 07/12/2024     Pre procedure diagnosis: male sterilization    Post procedure diagnosis:same    Surgeon: Alphonse    Assistant: None    Procedure performed: Bilateral vasectomy    Blood loss: Min.    Specimen: None    Procedure in detail:  After ainformed consent was obtained, the patient was placed in the supine position.  The skin was sterilized with a prep.  A time out was performed.  Attention was then turned to the patient's left hemiscrotum.    The vas was isolated on this side.  Local anesthesia was applied with 1% lidocaine.  The skin overlying the vas was incised sharply.  The vas was then isolated and grasped with a ring forceps.  It was brought through the incision.  The adventia overlying the vas was incised sharply.  The vas was then doubly clamped with a hemostat.  The vas was divided between the two hemostats with a 15 blade scalpel.    The ends of the vas were cauterized and tied with 2-0 silk sutures.  Two sutures were placed on each side. Electrocautery was used to obtain hemostasis.  A fascial interposition was then done with a 3-0 chromic.    The wound was then closed with a 3-0 chromic.    Attention was then turned to the right hemiscrotum and the exact same procedure was done. The vas was isolated on this side.  Local anesthesia was applied with 1% lidocaine.  The skin overlying the vas was incised sharply.  The vas was then isolated and grasped with a ring forceps.  It was brought through the incision.  The adventia overlying the vas was incised sharply.  The vas was then doubly clamped with a hemostat.  The vas was divided between the two hemostats with a 15 blade scalpel.    The ends of the vas were cauterized and tied with 2-0 silk sutures.  Two sutures were placed on each side. Electrocautery was used to obtain hemostasis.  A fascial interposition was then done with a 3-0 chromic.    The wound was then closed with a 3-0 chromic.    He tolerated the procedure well without complication.   He was advised to continue contraception until he brings in 2 semen samples that show no sperm.  He is also to avoid lifting, strenuous exercise, intercourse, NSAIDS, and ASA for 1 week.  He will be discharged home is stable condition.  He is to follow up prn.

## 2024-07-12 NOTE — TELEPHONE ENCOUNTER
Attempted to reach pt re: procedural appt scheduled today at 0815 for Dr. Cunha at [facility] [address]; left voicemail

## 2024-07-22 ENCOUNTER — TELEPHONE (OUTPATIENT)
Dept: UROLOGY | Facility: CLINIC | Age: 39
End: 2024-07-22
Payer: COMMERCIAL

## 2024-07-22 ENCOUNTER — PATIENT MESSAGE (OUTPATIENT)
Dept: UROLOGY | Facility: CLINIC | Age: 39
End: 2024-07-22
Payer: COMMERCIAL

## 2024-07-22 NOTE — TELEPHONE ENCOUNTER
----- Message from Gladys Olivia sent at 7/22/2024  8:34 AM CDT -----  Regarding: missed call  Contact: [phone]  Jim Waddell calling regarding Patient Advice (message) for # pt is returning call from nate devine call to advise

## 2024-07-22 NOTE — TELEPHONE ENCOUNTER
Per MD, pt should wait until incision is fully closed which would be about 2-3 weeks for swimming. Left VM with clinic #, as pt was unable to be reached.     ----- Message from Rain Vieira LPN sent at 7/19/2024  5:03 PM CDT -----    ----- Message -----  From: Allison Lyon  Sent: 7/19/2024   4:22 PM CDT  To: [facility] MARIN Staff    Type:  Needs Medical Advice    Who Called: Patient  Best Call Back Number: [phone]  Additional Information: Patient is requesting a call back,  asking if he is allowed to swim 1 week post Vasectomy.

## 2024-12-31 ENCOUNTER — TELEPHONE (OUTPATIENT)
Dept: UROLOGY | Facility: CLINIC | Age: 39
End: 2024-12-31
Payer: COMMERCIAL

## 2024-12-31 NOTE — TELEPHONE ENCOUNTER
Call was placed back to patient he was informed of where to drop off specimen after his procedure in July he ws given address and time.

## 2024-12-31 NOTE — TELEPHONE ENCOUNTER
----- Message from Davide sent at 12/31/2024  9:39 AM CST -----  Regarding: med advice  Contact: [phone]  Needs Medical Advice  Who Called:pt   Would the patient rather a call back or a response via [facility]? Call   Best Call Back Number: [phone]  Additional Information: Wanted to bring in a sample and make sure the procedure worked. Needs to know where to bring sample and how this process works. Please call back and advise.

## 2025-01-14 ENCOUNTER — TELEPHONE (OUTPATIENT)
Dept: UROLOGY | Facility: CLINIC | Age: 40
End: 2025-01-14
Payer: COMMERCIAL

## 2025-01-14 NOTE — TELEPHONE ENCOUNTER
Call was placed back to patient he was informed that he can drop off the specimen on the second floor urology [facility] patient was made aware and stated he was already there.

## 2025-01-14 NOTE — TELEPHONE ENCOUNTER
----- Message from Mahas sent at 1/14/2025  8:58 AM CST -----  Regarding: Sample?  Contact: [phone]  Hi,    Who called:The pt       Reason:Would like to know where can he bring a semen sample? Pls call the pt at  [phone]. Pt says he is driving to the Main [place] office now.       Provider's name:Dr. Cunha      Additional Information:Thank you.

## 2025-01-15 ENCOUNTER — PATIENT MESSAGE (OUTPATIENT)
Dept: UROLOGY | Facility: CLINIC | Age: 40
End: 2025-01-15
Payer: COMMERCIAL

## 2025-02-14 ENCOUNTER — LAB VISIT (OUTPATIENT)
Dept: LAB | Facility: HOSPITAL | Age: 40
End: 2025-02-14
Payer: COMMERCIAL

## 2025-02-14 DIAGNOSIS — Z98.52 S/P VASECTOMY: Primary | ICD-10-CM

## 2025-02-14 DIAGNOSIS — Z98.52 S/P VASECTOMY: ICD-10-CM

## 2025-02-14 PROCEDURE — 89320 SEMEN ANAL VOL/COUNT/MOT: CPT

## 2025-02-16 ENCOUNTER — RESULTS FOLLOW-UP (OUTPATIENT)
Dept: UROLOGY | Facility: CLINIC | Age: 40
End: 2025-02-16

## 2025-02-21 LAB
SPECIMEN VOL SMN: 1.5 ML
SPERM P VAS # SMN: NORMAL M/ML

## 2025-05-10 ENCOUNTER — PATIENT MESSAGE (OUTPATIENT)
Dept: DERMATOLOGY | Facility: CLINIC | Age: 40
End: 2025-05-10
Payer: COMMERCIAL

## 2025-05-22 ENCOUNTER — OFFICE VISIT (OUTPATIENT)
Dept: DERMATOLOGY | Facility: CLINIC | Age: 40
End: 2025-05-22
Payer: COMMERCIAL

## 2025-05-22 DIAGNOSIS — L21.9 SEBORRHEIC DERMATITIS: ICD-10-CM

## 2025-05-22 PROCEDURE — 1160F RVW MEDS BY RX/DR IN RCRD: CPT | Mod: CPTII,95,, | Performed by: DERMATOLOGY

## 2025-05-22 PROCEDURE — 98006 SYNCH AUDIO-VIDEO EST MOD 30: CPT | Mod: 95,,, | Performed by: DERMATOLOGY

## 2025-05-22 PROCEDURE — G2211 COMPLEX E/M VISIT ADD ON: HCPCS | Mod: 95,,, | Performed by: DERMATOLOGY

## 2025-05-22 PROCEDURE — 1159F MED LIST DOCD IN RCRD: CPT | Mod: CPTII,95,, | Performed by: DERMATOLOGY

## 2025-05-22 RX ORDER — ROFLUMILAST 3 MG/G
AEROSOL, FOAM TOPICAL
Qty: 60 G | Refills: 3 | Status: SHIPPED | OUTPATIENT
Start: 2025-05-22

## 2025-05-22 RX ORDER — CICLOPIROX 1 G/100ML
SHAMPOO TOPICAL
Qty: 240 ML | Refills: 11 | Status: SHIPPED | OUTPATIENT
Start: 2025-05-22

## 2025-05-22 RX ORDER — BETAMETHASONE VALERATE 1 MG/ML
LOTION CUTANEOUS
Qty: 60 ML | Refills: 5 | Status: SHIPPED | OUTPATIENT
Start: 2025-05-22

## 2025-05-22 NOTE — PROGRESS NOTES
The patient location is: home  The chief complaint leading to consultation is: seb derm  Visit type: virtual visit with synchronous audio and video  Total time spent with patient: 10 minutes  Each patient to whom I provide medical services by telemedicine is:  (1) informed of the relationship between the physician and patient and the respective role of any other health care provider with respect to management of the patient; and (2) notified that he or she may decline to receive medical services by telemedicine and may withdraw from such care at any time.      Patient Information  Name: Jim Waddell  : 1985  MRN: [id number]     Referring Physician:  No ref. provider found   Primary Care Physician:  Mauricio Lomeli MD   Date of Visit: 25      Subjective:     History of Present lllness:    Jim Waddell is a 40 y.o. male who presents with a chief complaint of seb derm.  Location: scalp  Signs/Symptoms: flaking on scalp, itching  Symptom course: unchanged  Current treatment: betamethasone valerate 0.1% lotion, ciclopirox shampoo    Used lotrimin for athlete's foot. Clear the rash between toes. Still a little itchy over knuckle.    Patient was last seen: 2023.  Prior notes by myself reviewed.   Clinical documentation obtained by nursing staff reviewed.    Review of Systems    Objective:   Physical Exam     Diagram Legend     Erythematous scaling macule/papule c/w actinic keratosis       Vascular papule c/w angioma      Pigmented verrucoid papule/plaque c/w seborrheic keratosis      Yellow umbilicated papule c/w sebaceous hyperplasia      Irregularly shaped tan macule c/w lentigo     1-2 mm smooth white papules consistent with Milia      Movable subcutaneous cyst with punctum c/w epidermal inclusion cyst      Subcutaneous movable cyst c/w pilar cyst      Firm pink to brown papule c/w dermatofibroma      Pedunculated fleshy papule(s) c/w skin tag(s)      Evenly pigmented macule c/w  junctional nevus     Mildly variegated pigmented, slightly irregular-bordered macule c/w mildly atypical nevus      Flesh colored to evenly pigmented papule c/w intradermal nevus       Pink pearly papule/plaque c/w basal cell carcinoma      Erythematous hyperkeratotic cursted plaque c/w SCC      Surgical scar with no sign of skin cancer recurrence      Open and closed comedones      Inflammatory papules and pustules      Verrucoid papule consistent consistent with wart     Erythematous eczematous patches and plaques     Dystrophic onycholytic nail with subungual debris c/w onychomycosis     Umbilicated papule    Erythematous-base heme-crusted tan verrucoid plaque consistent with inflamed seborrheic keratosis     Erythematous Silvery Scaling Plaque c/w Psoriasis     See annotation            [] Data reviewed  [] Prior external notes reviewed  [] Independent review of test  [] Management discussed with another provider  [] Independent historian    Assessment / Plan:        Seborrheic dermatitis  - chronic problem, not at treatment goal  -     ciclopirox 1 % shampoo; LATHER SCALP FOR 5-10 MINUTES, THEN RINSE. USE Daily.  Dispense: 240 mL; Refill: 11  -     betamethasone valerate 0.1% (VALISONE) 0.1 % Lotn; AAA of scalp daily-bid prn scaling/itching.  Dispense: 60 mL; Refill: 5    Will also trial Zoryve.  -     roflumilast (ZORYVE) 0.3 % Foam; Apply to affected areas of scalp and ears daily prn seb derm.  Dispense: 60 g; Refill: 3      Follow up in about 1 year (around 5/22/2026) for follow up, or sooner if symptoms worsening or not improving.      Clari Robertson MD, FAAD  [facility]

## (undated) DEVICE — KIT PROBE COVER WITH GEL

## (undated) DEVICE — CATH CENTESIS ONESTEP 5FRX10CM

## (undated) DEVICE — TRAY PARACENTESIS 8FR

## (undated) DEVICE — GLOVE BIOGEL SKINSENSE PI 7.5